# Patient Record
Sex: FEMALE | Race: OTHER | Employment: UNEMPLOYED | URBAN - METROPOLITAN AREA
[De-identification: names, ages, dates, MRNs, and addresses within clinical notes are randomized per-mention and may not be internally consistent; named-entity substitution may affect disease eponyms.]

---

## 2017-01-09 ENCOUNTER — TELEPHONE (OUTPATIENT)
Dept: OBGYN CLINIC | Facility: CLINIC | Age: 32
End: 2017-01-09

## 2017-01-09 NOTE — TELEPHONE ENCOUNTER
Torres Lucas results from [address] [place] [address] dated 1/5/17 placed in [facility] folder for review. Copy to brown folder.

## 2017-01-26 ENCOUNTER — TELEPHONE (OUTPATIENT)
Dept: NEUROLOGY | Facility: CLINIC | Age: 32
End: 2017-01-26

## 2017-01-26 ENCOUNTER — ROUTINE PRENATAL (OUTPATIENT)
Dept: OBGYN CLINIC | Facility: CLINIC | Age: 32
End: 2017-01-26

## 2017-01-26 VITALS
DIASTOLIC BLOOD PRESSURE: 63 MMHG | BODY MASS INDEX: 19 KG/M2 | WEIGHT: 111 LBS | SYSTOLIC BLOOD PRESSURE: 100 MMHG | HEART RATE: 76 BPM

## 2017-01-26 DIAGNOSIS — Z34.92 ENCOUNTER FOR SUPERVISION OF NORMAL PREGNANCY IN SECOND TRIMESTER, UNSPECIFIED GRAVIDITY: Primary | ICD-10-CM

## 2017-01-26 LAB
MULTISTIX LOT#: NORMAL NUMERIC
PH, URINE: 7.5 (ref 4.5–8)
SPECIFIC GRAVITY: 1.01 (ref 1–1.03)
UROBILINOGEN,SEMI-QN: 0.2 MG/DL (ref 0–1.9)

## 2017-01-26 NOTE — PROGRESS NOTES
No issues reported. NT normal.  Patient declined hormones for the FTS. Discussed importance of doing msAFP testing giving anti-seizures meds (tegretol) but patient declines.   Discussed that anti-seizure meds can affect neural tube and cause ONTD, but veronika

## 2017-02-08 ENCOUNTER — TELEPHONE (OUTPATIENT)
Dept: OBGYN CLINIC | Facility: CLINIC | Age: 32
End: 2017-02-08

## 2017-02-08 NOTE — TELEPHONE ENCOUNTER
OB US dated 2/8/17 received from [address] [place] [address] and placed in JLK's folder for review.

## 2017-02-21 ENCOUNTER — TELEPHONE (OUTPATIENT)
Dept: OBGYN CLINIC | Facility: CLINIC | Age: 32
End: 2017-02-21

## 2017-02-22 ENCOUNTER — ROUTINE PRENATAL (OUTPATIENT)
Dept: OBGYN CLINIC | Facility: CLINIC | Age: 32
End: 2017-02-22

## 2017-02-22 VITALS
BODY MASS INDEX: 21 KG/M2 | SYSTOLIC BLOOD PRESSURE: 101 MMHG | DIASTOLIC BLOOD PRESSURE: 60 MMHG | HEART RATE: 91 BPM | WEIGHT: 120 LBS

## 2017-02-22 DIAGNOSIS — Z34.92 ENCOUNTER FOR SUPERVISION OF NORMAL PREGNANCY IN SECOND TRIMESTER, UNSPECIFIED GRAVIDITY: Primary | ICD-10-CM

## 2017-02-22 LAB
MULTISTIX LOT#: NORMAL NUMERIC
PH, URINE: 6.5 (ref 4.5–8)
SPECIFIC GRAVITY: 1.01 (ref 1–1.03)
UROBILINOGEN,SEMI-QN: 0.2 MG/DL (ref 0–1.9)

## 2017-02-22 RX ORDER — FOLIC ACID 1 MG/1
TABLET ORAL
COMMUNITY
End: 2017-03-23

## 2017-02-22 RX ORDER — CARBAMAZEPINE
POWDER (GRAM) MISCELLANEOUS
COMMUNITY
End: 2017-03-23

## 2017-03-10 ENCOUNTER — TELEPHONE (OUTPATIENT)
Dept: OBGYN CLINIC | Facility: CLINIC | Age: 32
End: 2017-03-10

## 2017-03-10 NOTE — TELEPHONE ENCOUNTER
OB U/S dated 3-10-17received from [facility]. Placed on JLK's desk for review and signoff .  Copy also placed in brown folder

## 2017-03-17 PROBLEM — Z36.89 ULTRASOUND SCAN TO CHECK INTERVAL GROWTH OF FETUS: Status: ACTIVE | Noted: 2017-03-17

## 2017-03-23 ENCOUNTER — ROUTINE PRENATAL (OUTPATIENT)
Dept: OBGYN CLINIC | Facility: CLINIC | Age: 32
End: 2017-03-23

## 2017-03-23 VITALS
HEART RATE: 94 BPM | DIASTOLIC BLOOD PRESSURE: 64 MMHG | WEIGHT: 129 LBS | BODY MASS INDEX: 22 KG/M2 | SYSTOLIC BLOOD PRESSURE: 104 MMHG

## 2017-03-23 DIAGNOSIS — Z34.92 ENCOUNTER FOR SUPERVISION OF NORMAL PREGNANCY IN SECOND TRIMESTER, UNSPECIFIED GRAVIDITY: Primary | ICD-10-CM

## 2017-03-23 LAB
GLUCOSE (URINE DIPSTICK): 250 MG/DL
MULTISTIX LOT#: NORMAL NUMERIC
PH, URINE: 6.5 (ref 4.5–8)
SPECIFIC GRAVITY: 1.01 (ref 1–1.03)

## 2017-03-23 RX ORDER — PRENATAL VIT/IRON BISGLY/FOLIC 29 MG-1 MG
TABLET ORAL
Refills: 11 | COMMUNITY
Start: 2017-02-26 | End: 2017-03-23

## 2017-03-23 NOTE — PROGRESS NOTES
Reviewed ketones and glucose in urine- had oatmeal this morning. Recommend labs (CBC and GTT) in next two weeks. Has [facility] US on 4/3/17.    RTC 4 wks

## 2017-03-27 ENCOUNTER — APPOINTMENT (OUTPATIENT)
Dept: LAB | Facility: HOSPITAL | Age: 32
End: 2017-03-27
Attending: OBSTETRICS & GYNECOLOGY
Payer: COMMERCIAL

## 2017-03-27 DIAGNOSIS — Z34.92 ENCOUNTER FOR SUPERVISION OF NORMAL PREGNANCY IN SECOND TRIMESTER, UNSPECIFIED GRAVIDITY: ICD-10-CM

## 2017-03-27 LAB
ERYTHROCYTE [DISTWIDTH] IN BLOOD BY AUTOMATED COUNT: 13.7 % (ref 11–15)
GLUCOSE 1H P 50 G GLC PO SERPL-MCNC: 119 MG/DL
HCT VFR BLD AUTO: 34.3 % (ref 35–48)
HGB BLD-MCNC: 11.7 G/DL (ref 12–16)
MCH RBC QN AUTO: 33.2 PG (ref 27–32)
MCHC RBC AUTO-ENTMCNC: 34.2 G/DL (ref 32–37)
MCV RBC AUTO: 97 FL (ref 80–100)
PLATELET # BLD AUTO: 180 K/UL (ref 140–400)
PMV BLD AUTO: 8.8 FL (ref 7.4–10.3)
RBC # BLD AUTO: 3.53 M/UL (ref 3.7–5.4)
WBC # BLD AUTO: 9.1 K/UL (ref 4–11)

## 2017-03-27 PROCEDURE — 36415 COLL VENOUS BLD VENIPUNCTURE: CPT

## 2017-03-27 PROCEDURE — 82950 GLUCOSE TEST: CPT

## 2017-03-27 PROCEDURE — 85027 COMPLETE CBC AUTOMATED: CPT

## 2017-04-03 ENCOUNTER — TELEPHONE (OUTPATIENT)
Dept: OBGYN CLINIC | Facility: CLINIC | Age: 32
End: 2017-04-03

## 2017-04-03 NOTE — TELEPHONE ENCOUNTER
4-3-17 [address] [place] [address] ULTRASOUND REPORT TO [facility] AND [facility].

## 2017-04-18 ENCOUNTER — TELEPHONE (OUTPATIENT)
Dept: OBGYN CLINIC | Facility: CLINIC | Age: 32
End: 2017-04-18

## 2017-04-18 ENCOUNTER — HOSPITAL ENCOUNTER (INPATIENT)
Facility: HOSPITAL | Age: 32
LOS: 1 days | Discharge: HOME | DRG: 778 | End: 2017-04-19
Attending: OBSTETRICS & GYNECOLOGY | Admitting: OBSTETRICS & GYNECOLOGY
Payer: COMMERCIAL

## 2017-04-18 PROBLEM — Z34.90 PREGNANCY (HCC): Status: ACTIVE | Noted: 2017-04-18

## 2017-04-18 PROBLEM — Z34.90 PREGNANCY: Status: ACTIVE | Noted: 2017-04-18

## 2017-04-18 RX ORDER — CARBAMAZEPINE 200 MG/1
200 TABLET ORAL 3 TIMES DAILY
Status: DISCONTINUED | OUTPATIENT
Start: 2017-04-18 | End: 2017-04-18

## 2017-04-18 RX ORDER — CARBAMAZEPINE 200 MG/1
200 TABLET ORAL EVERY MORNING
Status: DISCONTINUED | OUTPATIENT
Start: 2017-04-19 | End: 2017-04-19

## 2017-04-18 RX ORDER — CARBAMAZEPINE 200 MG/1
400 TABLET ORAL 3 TIMES DAILY
Status: DISCONTINUED | OUTPATIENT
Start: 2017-04-18 | End: 2017-04-18

## 2017-04-18 RX ORDER — DEXTROSE, SODIUM CHLORIDE, SODIUM LACTATE, POTASSIUM CHLORIDE, AND CALCIUM CHLORIDE 5; .6; .31; .03; .02 G/100ML; G/100ML; G/100ML; G/100ML; G/100ML
125 INJECTION, SOLUTION INTRAVENOUS CONTINUOUS
Status: DISCONTINUED | OUTPATIENT
Start: 2017-04-18 | End: 2017-04-19

## 2017-04-18 RX ORDER — CARBAMAZEPINE 200 MG/1
200 TABLET ORAL EVERY MORNING
Status: DISCONTINUED | OUTPATIENT
Start: 2017-04-19 | End: 2017-04-18

## 2017-04-18 RX ORDER — SODIUM CHLORIDE, SODIUM LACTATE, POTASSIUM CHLORIDE, CALCIUM CHLORIDE 600; 310; 30; 20 MG/100ML; MG/100ML; MG/100ML; MG/100ML
INJECTION, SOLUTION INTRAVENOUS
Status: COMPLETED
Start: 2017-04-18 | End: 2017-04-18

## 2017-04-18 RX ORDER — SODIUM CHLORIDE, SODIUM LACTATE, POTASSIUM CHLORIDE, CALCIUM CHLORIDE 600; 310; 30; 20 MG/100ML; MG/100ML; MG/100ML; MG/100ML
INJECTION, SOLUTION INTRAVENOUS CONTINUOUS
Status: DISCONTINUED | OUTPATIENT
Start: 2017-04-18 | End: 2017-04-19

## 2017-04-18 RX ORDER — 0.9 % SODIUM CHLORIDE 0.9 %
VIAL (ML) INJECTION
Status: COMPLETED
Start: 2017-04-18 | End: 2017-04-18

## 2017-04-18 RX ORDER — CARBAMAZEPINE 200 MG/1
400 TABLET ORAL NIGHTLY
Status: DISCONTINUED | OUTPATIENT
Start: 2017-04-18 | End: 2017-04-19

## 2017-04-18 RX ORDER — PRENATAL VIT,CAL 76/IRON/FOLIC 29 MG-1 MG
1 TABLET ORAL DAILY
Status: DISCONTINUED | OUTPATIENT
Start: 2017-04-18 | End: 2017-04-19

## 2017-04-18 RX ORDER — FOLIC ACID 1 MG/1
1 TABLET ORAL DAILY
Status: DISCONTINUED | OUTPATIENT
Start: 2017-04-19 | End: 2017-04-19

## 2017-04-18 RX ORDER — AMMONIA INHALANTS 0.04 G/.3ML
0.3 INHALANT RESPIRATORY (INHALATION) AS NEEDED
Status: DISCONTINUED | OUTPATIENT
Start: 2017-04-18 | End: 2017-04-19

## 2017-04-18 NOTE — TELEPHONE ENCOUNTER
MESSAGE REVIEWED WITH KCB IN THE OFFICE. PT IS TO GO TO [facility] TO BE CHECKED. PT NOTIFIED. PT SAID SHE HAS TO HAVE SOMEONE COME WATCH HER KIDS SO PROBABLY WON'T BE ABLE TO GET THERE BEFORE 1:30PM.   [facility] NOTIFIED.

## 2017-04-18 NOTE — H&P
Kayla Toth 33 Patient Status:  Inpatient    3/1/1985 MRN [id number]   Location [facility] Attending Yoly Owens MD   Hosp Day # 0 PCP Pipe Echeverria MD     Date of Admission: Allergies/Medications: Allergies:   No Known Allergies    Medications:    Prescriptions prior to admission:  folic acid 1 MG Oral Tab Take by mouth daily. Disp:  Rfl:  2017 at 0900   Prenatal Multivit-Min-Fe-FA (PRE- OR) Take by mouth.  Disp antepartum     Recent foreign travel to [facility]     Ultrasound     Pregnancy      Treatment Plan:    FFN obtained prior to exam  Plan for IV hydration  Check urine culture  KB still pending  Contractions not felt -- did not improve after voiding          Ri

## 2017-04-18 NOTE — TELEPHONE ENCOUNTER
C/O REACHING FOR DETERGENT AND IT SLIPPED AND HIT THE TOP OF HER BELLY. THIS HAPPENED EARLIER THIS MORNING BUT IS NOW NOTICING SOME PRESSURE AND SLIGHT PAIN AT THE BOTTOM OF HER BELLY. DENIES ANY SPOTTING.   DOES HAVE DISCHARGE, WET AND CLEAR BUT STATES T

## 2017-04-18 NOTE — TELEPHONE ENCOUNTER
PT STATE THE DETERGENT FALL ON HER BELLY / SHE ALSO STATE SHE'S HAVING LOWER ABD PAIN / PT STATE SHE'S 28 WEEKS / PLS ADV

## 2017-04-19 VITALS
DIASTOLIC BLOOD PRESSURE: 55 MMHG | SYSTOLIC BLOOD PRESSURE: 90 MMHG | HEART RATE: 79 BPM | RESPIRATION RATE: 16 BRPM | TEMPERATURE: 98 F

## 2017-04-19 PROCEDURE — 99221 1ST HOSP IP/OBS SF/LOW 40: CPT | Performed by: OBSTETRICS & GYNECOLOGY

## 2017-04-19 RX ORDER — FOLIC ACID 1 MG/1
1 TABLET ORAL DAILY
Status: DISCONTINUED | OUTPATIENT
Start: 2017-04-18 | End: 2017-04-19

## 2017-04-19 NOTE — PROGRESS NOTES
[facility] HOSP - [facility]    OB/GYNE Antepartum note      Jaye Lerner Patient Status:  Inpatient    3/1/1985 MRN [id number]   Location [facility] Attending Clara Escamilla MD   Hosp Day # 1 PCP Deann Viera MD       Subjective the last 72 hours. Assessment/Plan   IUP at 28w4d admitted for contractions (not felt), hx abd trauma, Hospital Day: 2    Pregnancy  FFN neg, KB neg  Contractions resolved with overnight monitoring. Notified [facility] of care.      Lisa Antis

## 2017-04-21 ENCOUNTER — HOSPITAL ENCOUNTER (OUTPATIENT)
Facility: HOSPITAL | Age: 32
Setting detail: OBSERVATION
Discharge: HOME | DRG: 778 | End: 2017-04-22
Attending: OBSTETRICS & GYNECOLOGY | Admitting: OBSTETRICS & GYNECOLOGY
Payer: COMMERCIAL

## 2017-04-21 PROBLEM — O47.00 PRETERM CONTRACTIONS (HCC): Status: ACTIVE | Noted: 2017-04-21

## 2017-04-21 PROBLEM — O47.00 PRETERM CONTRACTIONS: Status: ACTIVE | Noted: 2017-04-21

## 2017-04-21 RX ORDER — SODIUM CHLORIDE 0.9 % (FLUSH) 0.9 %
10 SYRINGE (ML) INJECTION AS NEEDED
Status: DISCONTINUED | OUTPATIENT
Start: 2017-04-21 | End: 2017-04-22

## 2017-04-21 RX ORDER — ONDANSETRON 2 MG/ML
INJECTION INTRAMUSCULAR; INTRAVENOUS
Status: COMPLETED
Start: 2017-04-21 | End: 2017-04-21

## 2017-04-21 RX ORDER — DEXTROSE, SODIUM CHLORIDE, SODIUM LACTATE, POTASSIUM CHLORIDE, AND CALCIUM CHLORIDE 5; .6; .31; .03; .02 G/100ML; G/100ML; G/100ML; G/100ML; G/100ML
INJECTION, SOLUTION INTRAVENOUS
Status: COMPLETED
Start: 2017-04-21 | End: 2017-04-21

## 2017-04-21 RX ORDER — DEXTROSE, SODIUM CHLORIDE, SODIUM LACTATE, POTASSIUM CHLORIDE, AND CALCIUM CHLORIDE 5; .6; .31; .03; .02 G/100ML; G/100ML; G/100ML; G/100ML; G/100ML
INJECTION, SOLUTION INTRAVENOUS CONTINUOUS
Status: ACTIVE | OUTPATIENT
Start: 2017-04-21 | End: 2017-04-21

## 2017-04-21 RX ORDER — SODIUM CHLORIDE, SODIUM LACTATE, POTASSIUM CHLORIDE, CALCIUM CHLORIDE 600; 310; 30; 20 MG/100ML; MG/100ML; MG/100ML; MG/100ML
INJECTION, SOLUTION INTRAVENOUS CONTINUOUS
Status: DISCONTINUED | OUTPATIENT
Start: 2017-04-21 | End: 2017-04-22

## 2017-04-21 RX ORDER — DEXTROSE, SODIUM CHLORIDE, SODIUM LACTATE, POTASSIUM CHLORIDE, AND CALCIUM CHLORIDE 5; .6; .31; .03; .02 G/100ML; G/100ML; G/100ML; G/100ML; G/100ML
INJECTION, SOLUTION INTRAVENOUS CONTINUOUS
Status: DISCONTINUED | OUTPATIENT
Start: 2017-04-21 | End: 2017-04-22

## 2017-04-21 RX ORDER — SODIUM CHLORIDE, SODIUM LACTATE, POTASSIUM CHLORIDE, CALCIUM CHLORIDE 600; 310; 30; 20 MG/100ML; MG/100ML; MG/100ML; MG/100ML
INJECTION, SOLUTION INTRAVENOUS
Status: COMPLETED
Start: 2017-04-21 | End: 2017-04-21

## 2017-04-21 RX ORDER — ONDANSETRON 2 MG/ML
4 INJECTION INTRAMUSCULAR; INTRAVENOUS ONCE
Status: COMPLETED | OUTPATIENT
Start: 2017-04-21 | End: 2017-04-21

## 2017-04-22 ENCOUNTER — TELEPHONE (OUTPATIENT)
Dept: OBGYN CLINIC | Facility: CLINIC | Age: 32
End: 2017-04-22

## 2017-04-22 VITALS
HEART RATE: 94 BPM | RESPIRATION RATE: 17 BRPM | DIASTOLIC BLOOD PRESSURE: 46 MMHG | SYSTOLIC BLOOD PRESSURE: 87 MMHG | TEMPERATURE: 99 F

## 2017-04-22 PROBLEM — O21.9: Status: ACTIVE | Noted: 2017-04-22

## 2017-04-22 PROBLEM — R11.10 VOMITING: Status: ACTIVE | Noted: 2017-04-22

## 2017-04-22 PROBLEM — O21.9 VOMITING PREGNANCY: Status: ACTIVE | Noted: 2017-04-22

## 2017-04-22 PROCEDURE — 99221 1ST HOSP IP/OBS SF/LOW 40: CPT | Performed by: OBSTETRICS & GYNECOLOGY

## 2017-04-22 RX ORDER — PROCHLORPERAZINE 25 MG
25 SUPPOSITORY, RECTAL RECTAL EVERY 12 HOURS PRN
Status: DISCONTINUED | OUTPATIENT
Start: 2017-04-22 | End: 2017-04-22

## 2017-04-22 RX ORDER — METOCLOPRAMIDE HYDROCHLORIDE 5 MG/ML
10 INJECTION INTRAMUSCULAR; INTRAVENOUS EVERY 6 HOURS PRN
Status: DISCONTINUED | OUTPATIENT
Start: 2017-04-22 | End: 2017-04-22

## 2017-04-22 RX ORDER — ONDANSETRON 4 MG/1
4 TABLET, FILM COATED ORAL EVERY 8 HOURS PRN
Qty: 12 TABLET | Refills: 0 | Status: SHIPPED | OUTPATIENT
Start: 2017-04-22 | End: 2017-05-26

## 2017-04-22 RX ORDER — ONDANSETRON 4 MG/1
4 TABLET, ORALLY DISINTEGRATING ORAL ONCE
Status: DISCONTINUED | OUTPATIENT
Start: 2017-04-22 | End: 2017-04-22

## 2017-04-22 RX ORDER — ONDANSETRON 2 MG/ML
8 INJECTION INTRAMUSCULAR; INTRAVENOUS EVERY 6 HOURS PRN
Status: DISCONTINUED | OUTPATIENT
Start: 2017-04-22 | End: 2017-04-22

## 2017-04-22 NOTE — PROGRESS NOTES
Spoke with Dr Mary Jane Bernardo informed that pt vomited and informed of POC urine results and orders recd for zofran 4 mg IVp and after 1 liter bolus of LR follow with !  Liter bolus of D5LR

## 2017-04-22 NOTE — H&P
Kayla Toth 33 Patient Status:  Inpatient    3/1/1985 MRN [id number]   Location [address] C-[address] Attending Steffanie Key MD   Hosp Day # 1 PCP Phi Olivas MD     Date of Admission oz (3.714 kg) F NORMAL SPONT None N Y   1 Term 06/09/09 40w3d  8 lb 8 oz (3.856 kg) F NORMAL SPONT None N Y        Past Medical History:   Past Medical History   Diagnosis Date   • Seizure disorder ([facility] [address] 2003     last seizure 4/2015   • Environmental aller 32.0-37.0 g/dl   RDW 13.1 11.0-15.0 %    140-400 K/UL   MPV 9.1 7.4-10.3 fL   -FETAL FIBRONECTIN   Result Value Ref Range   Fetal Fibrinectin Negative Negative   -KLEIHAUER BETKE   Result Value Ref Range   Kleihauer Betke Result <0.1 %   Gelene Nailer

## 2017-04-22 NOTE — PROGRESS NOTES
Home in [address] [place] [address]. Per  [facility]. Pt c/o N/v on admission resolved at this time . nahomy  Juice and crackers. Pt agrees w/poc. rx sent to pharmacy by md. F/u in ob office 1 wks. Advance diet as nahomy.   Fluids and diet teaching done w/pt

## 2017-04-24 ENCOUNTER — ROUTINE PRENATAL (OUTPATIENT)
Dept: OBGYN CLINIC | Facility: CLINIC | Age: 32
End: 2017-04-24

## 2017-04-24 VITALS
BODY MASS INDEX: 23 KG/M2 | DIASTOLIC BLOOD PRESSURE: 61 MMHG | WEIGHT: 132 LBS | HEART RATE: 91 BPM | SYSTOLIC BLOOD PRESSURE: 101 MMHG

## 2017-04-24 DIAGNOSIS — Z34.83 ENCOUNTER FOR SUPERVISION OF OTHER NORMAL PREGNANCY IN THIRD TRIMESTER: Primary | ICD-10-CM

## 2017-04-24 NOTE — PROGRESS NOTES
Was in [facility] last week after heavy object fell on her abdomen- observed overnight due to UC's, pt denies current UC's

## 2017-04-24 NOTE — PAYOR COMM NOTE
Attending Physician: No att. providers found    Review Type: ADMISSION   Reviewer:  Eligio Thrasher       Date: April 24, 2017 - 2:17 PM  Payor: [employer]  Authorization Number: [id number]  Admit date: 4/18/2017  1:12 PM   Admitted from [facility] 40w3d  8 lb 8 oz (3.856 kg) F NORMAL SPONT None N Y          Gyne History: Cervical Papanicolaou to be done in MD's office  , monthly periods, hx of STD: none    Past Medical History:   Past Medical History   Diagnosis Date   • Seizure disorder ([facility] [address] 2003 Lab Results  Component Value Date   COLORUR Yellow 10/30/2014   CLARITY Hazy* 10/30/2014   SPECGRAVITY 1.010 03/23/2017   PROUR Negative 10/30/2014   GLUUR 250 03/23/2017   KETUR Negative 10/30/2014   BILUR Negative 10/30/2014   BLOODURINE Negative

## 2017-05-01 ENCOUNTER — TELEPHONE (OUTPATIENT)
Dept: OBGYN CLINIC | Facility: CLINIC | Age: 32
End: 2017-05-01

## 2017-05-01 NOTE — TELEPHONE ENCOUNTER
[address] report dated 5/1/17 placed in [facility] folder for review. Copy to brown folder.

## 2017-05-08 NOTE — PAYOR COMM NOTE
Attending Physician: No att. providers found    Review Type: ADMISSION   Reviewer:  Jessica Parisi       Date: May 8, 2017 - 11:39 AM  [facility]  Authorization Number: [id number]  Admit date: 4/21/2017  7:59 PM   Admitted from [facility]

## 2017-05-12 ENCOUNTER — ROUTINE PRENATAL (OUTPATIENT)
Dept: OBGYN CLINIC | Facility: CLINIC | Age: 32
End: 2017-05-12

## 2017-05-12 VITALS
HEART RATE: 73 BPM | BODY MASS INDEX: 23 KG/M2 | WEIGHT: 136 LBS | DIASTOLIC BLOOD PRESSURE: 60 MMHG | SYSTOLIC BLOOD PRESSURE: 93 MMHG

## 2017-05-12 DIAGNOSIS — Z34.93 ENCOUNTER FOR SUPERVISION OF NORMAL PREGNANCY IN THIRD TRIMESTER, UNSPECIFIED GRAVIDITY: Primary | ICD-10-CM

## 2017-05-19 NOTE — PAYOR COMM NOTE
Ayleen Broussard #[id number]  (30 year old F)       [facility] [id number]         Yenifer Enriquez MD Physician Addendum  H&P 4/22/2017  5:10 AM      Expand All Collapse All    [address]   Para  Term    AB  SAB  TAB  Ectopic  Multiple  Living    4  3  3              3        #  Outcome  Date  GA  Lbr Gilberto/2nd  Weight  Sex  Delivery  Anes  PTL  Lv    4  Current                      3  Term  03/15/15  40w4d    9 lb 14 oz (4.479  130-140 BPM, Fetal heart variability: moderate and reactive for gestational age    Decelerations: No  Contractions: Irritability on admission which has now resolved.      Lab Review:    Results for orders placed or performed during the hospital encounter 2. Resolved now that patient well hydrated    3. Plan to PO challenge patient today. If able to tolerate PO will discharge home with Zofran and follow up in office this week. If unable to tolerate PO patient will not be able to be discharged.  Zofran sent t

## 2017-05-26 ENCOUNTER — ROUTINE PRENATAL (OUTPATIENT)
Dept: OBGYN CLINIC | Facility: CLINIC | Age: 32
End: 2017-05-26

## 2017-05-26 VITALS
SYSTOLIC BLOOD PRESSURE: 98 MMHG | DIASTOLIC BLOOD PRESSURE: 66 MMHG | HEART RATE: 71 BPM | BODY MASS INDEX: 24 KG/M2 | WEIGHT: 138.19 LBS

## 2017-05-26 DIAGNOSIS — Z34.83 ENCOUNTER FOR SUPERVISION OF OTHER NORMAL PREGNANCY IN THIRD TRIMESTER: Primary | ICD-10-CM

## 2017-05-30 ENCOUNTER — TELEPHONE (OUTPATIENT)
Dept: OBGYN CLINIC | Facility: CLINIC | Age: 32
End: 2017-05-30

## 2017-06-09 ENCOUNTER — ROUTINE PRENATAL (OUTPATIENT)
Dept: OBGYN CLINIC | Facility: CLINIC | Age: 32
End: 2017-06-09

## 2017-06-09 ENCOUNTER — APPOINTMENT (OUTPATIENT)
Dept: LAB | Facility: HOSPITAL | Age: 32
End: 2017-06-09
Attending: OBSTETRICS & GYNECOLOGY
Payer: COMMERCIAL

## 2017-06-09 VITALS
SYSTOLIC BLOOD PRESSURE: 105 MMHG | BODY MASS INDEX: 24 KG/M2 | WEIGHT: 141 LBS | DIASTOLIC BLOOD PRESSURE: 67 MMHG | HEART RATE: 91 BPM

## 2017-06-09 DIAGNOSIS — Z34.93 ENCOUNTER FOR SUPERVISION OF NORMAL PREGNANCY IN THIRD TRIMESTER, UNSPECIFIED GRAVIDITY: Primary | ICD-10-CM

## 2017-06-09 DIAGNOSIS — Z34.93 ENCOUNTER FOR SUPERVISION OF NORMAL PREGNANCY IN THIRD TRIMESTER, UNSPECIFIED GRAVIDITY: ICD-10-CM

## 2017-06-09 PROCEDURE — 85027 COMPLETE CBC AUTOMATED: CPT

## 2017-06-09 PROCEDURE — 86780 TREPONEMA PALLIDUM: CPT

## 2017-06-09 PROCEDURE — 36415 COLL VENOUS BLD VENIPUNCTURE: CPT

## 2017-06-23 ENCOUNTER — ROUTINE PRENATAL (OUTPATIENT)
Dept: OBGYN CLINIC | Facility: CLINIC | Age: 32
End: 2017-06-23

## 2017-06-23 VITALS
WEIGHT: 148 LBS | DIASTOLIC BLOOD PRESSURE: 62 MMHG | BODY MASS INDEX: 25 KG/M2 | HEART RATE: 80 BPM | SYSTOLIC BLOOD PRESSURE: 101 MMHG

## 2017-06-23 DIAGNOSIS — Z34.93 ENCOUNTER FOR SUPERVISION OF NORMAL PREGNANCY IN THIRD TRIMESTER, UNSPECIFIED GRAVIDITY: Primary | ICD-10-CM

## 2017-06-23 RX ORDER — PRENATAL VIT/IRON BISGLY/FOLIC 29 MG-1 MG
TABLET ORAL
Refills: 11 | Status: ON HOLD | COMMUNITY
Start: 2017-03-28 | End: 2017-07-19

## 2017-06-30 ENCOUNTER — ROUTINE PRENATAL (OUTPATIENT)
Dept: OBGYN CLINIC | Facility: CLINIC | Age: 32
End: 2017-06-30

## 2017-06-30 VITALS
WEIGHT: 147 LBS | BODY MASS INDEX: 25 KG/M2 | HEART RATE: 82 BPM | DIASTOLIC BLOOD PRESSURE: 69 MMHG | SYSTOLIC BLOOD PRESSURE: 109 MMHG

## 2017-06-30 DIAGNOSIS — Z34.83 OTHER NORMAL PREGNANCY, NOT FIRST, THIRD TRIMESTER: Primary | ICD-10-CM

## 2017-06-30 LAB
MULTISTIX LOT#: NORMAL NUMERIC
PH, URINE: 7.5 (ref 4.5–8)
SPECIFIC GRAVITY: 1.01 (ref 1–1.03)
URINE-COLOR: YELLOW
UROBILINOGEN,SEMI-QN: 0 MG/DL (ref 0–1.9)

## 2017-06-30 PROCEDURE — 59426 ANTEPARTUM CARE ONLY: CPT | Performed by: OBSTETRICS & GYNECOLOGY

## 2017-07-06 ENCOUNTER — ROUTINE PRENATAL (OUTPATIENT)
Dept: OBGYN CLINIC | Facility: CLINIC | Age: 32
End: 2017-07-06

## 2017-07-06 VITALS
WEIGHT: 148 LBS | HEART RATE: 71 BPM | BODY MASS INDEX: 25 KG/M2 | SYSTOLIC BLOOD PRESSURE: 97 MMHG | DIASTOLIC BLOOD PRESSURE: 60 MMHG

## 2017-07-06 DIAGNOSIS — Z34.83 ENCOUNTER FOR SUPERVISION OF OTHER NORMAL PREGNANCY IN THIRD TRIMESTER: Primary | ICD-10-CM

## 2017-07-06 LAB
APPEARANCE: CLEAR
MULTISTIX LOT#: NORMAL NUMERIC
URINE-COLOR: YELLOW

## 2017-07-13 ENCOUNTER — ROUTINE PRENATAL (OUTPATIENT)
Dept: OBGYN CLINIC | Facility: CLINIC | Age: 32
End: 2017-07-13

## 2017-07-13 VITALS
DIASTOLIC BLOOD PRESSURE: 68 MMHG | SYSTOLIC BLOOD PRESSURE: 103 MMHG | WEIGHT: 149.81 LBS | BODY MASS INDEX: 26 KG/M2 | HEART RATE: 83 BPM

## 2017-07-13 DIAGNOSIS — Z34.93 ENCOUNTER FOR SUPERVISION OF NORMAL PREGNANCY IN THIRD TRIMESTER, UNSPECIFIED GRAVIDITY: Primary | ICD-10-CM

## 2017-07-13 LAB
MULTISTIX LOT#: NORMAL NUMERIC
PH, URINE: 7 (ref 4.5–8)
SPECIFIC GRAVITY: 1.01 (ref 1–1.03)
UROBILINOGEN,SEMI-QN: 0 MG/DL (ref 0–1.9)

## 2017-07-13 PROCEDURE — 76815 OB US LIMITED FETUS(S): CPT | Performed by: OBSTETRICS & GYNECOLOGY

## 2017-07-13 NOTE — PROGRESS NOTES
Ronald at visit. Began irregular and mild UC's at 1100 today. No ROM/ blood. KIRSTY today:  2.7, 3.1, 2.9, 1.1 = 9.8 cm. NST scheduled for 11AM tomorrow and she'll need Cervidil scheduled on Monday 07-17 evening if undelivered.

## 2017-07-14 ENCOUNTER — HOSPITAL ENCOUNTER (OUTPATIENT)
Dept: OBGYN CLINIC | Facility: HOSPITAL | Age: 32
Discharge: HOME | End: 2017-07-14
Payer: COMMERCIAL

## 2017-07-14 ENCOUNTER — TELEPHONE (OUTPATIENT)
Dept: OBGYN CLINIC | Facility: CLINIC | Age: 32
End: 2017-07-14

## 2017-07-14 ENCOUNTER — HOSPITAL ENCOUNTER (OUTPATIENT)
Facility: HOSPITAL | Age: 32
Discharge: HOME | End: 2017-07-14
Attending: OBSTETRICS & GYNECOLOGY | Admitting: OBSTETRICS & GYNECOLOGY
Payer: COMMERCIAL

## 2017-07-14 VITALS — SYSTOLIC BLOOD PRESSURE: 114 MMHG | DIASTOLIC BLOOD PRESSURE: 65 MMHG | HEART RATE: 84 BPM

## 2017-07-14 PROCEDURE — 59025 FETAL NON-STRESS TEST: CPT | Performed by: OBSTETRICS & GYNECOLOGY

## 2017-07-14 NOTE — TELEPHONE ENCOUNTER
Pt at ; just returned from [facility]; wants to speak with him re induction on Monday; pt seen by dr DEWEY yest

## 2017-07-14 NOTE — TELEPHONE ENCOUNTER
JONATHAN called and stated that pt is coming to see ZULEIMA after having her NST done at [facility] today. The nurses after the NST were trying to schedule her for Cervidil for Saturday, 7/15. Pt wanting her Cervidil for Monday, 7/17/17 and not available.  Pt came to the

## 2017-07-14 NOTE — TELEPHONE ENCOUNTER
I spoke with [facility] and then patient. NST scheduled 1130 on Monday 07-17 followed by Cervidil scheduled 1800 on Wed 07-19-17. I am on call and will do orders. I'm in hospital then.

## 2017-07-14 NOTE — NST
Nonstress Test   Patient: Fe Fleet    Gestation: 40w6d    NST:       Variability: Moderate           Accelerations: Yes           Decelerations: None            Baseline: 135 BPM           Uterine Irritability: No

## 2017-07-17 ENCOUNTER — HOSPITAL ENCOUNTER (OUTPATIENT)
Facility: HOSPITAL | Age: 32
Discharge: HOME | End: 2017-07-17
Attending: OBSTETRICS & GYNECOLOGY | Admitting: OBSTETRICS & GYNECOLOGY
Payer: MEDICAID

## 2017-07-17 ENCOUNTER — TELEPHONE (OUTPATIENT)
Dept: OBGYN CLINIC | Facility: CLINIC | Age: 32
End: 2017-07-17

## 2017-07-17 ENCOUNTER — HOSPITAL ENCOUNTER (INPATIENT)
Facility: HOSPITAL | Age: 32
LOS: 2 days | Discharge: HOME | End: 2017-07-19
Attending: OBSTETRICS & GYNECOLOGY | Admitting: OBSTETRICS & GYNECOLOGY
Payer: MEDICAID

## 2017-07-17 ENCOUNTER — HOSPITAL ENCOUNTER (OUTPATIENT)
Dept: OBGYN CLINIC | Facility: HOSPITAL | Age: 32
Discharge: HOME | End: 2017-07-17
Payer: MEDICAID

## 2017-07-17 VITALS — HEART RATE: 81 BPM | SYSTOLIC BLOOD PRESSURE: 114 MMHG | DIASTOLIC BLOOD PRESSURE: 65 MMHG

## 2017-07-17 LAB
ANTIBODY SCREEN: NEGATIVE
ERYTHROCYTE [DISTWIDTH] IN BLOOD BY AUTOMATED COUNT: 13.8 % (ref 11–15)
HCT VFR BLD AUTO: 38.7 % (ref 35–48)
HGB BLD-MCNC: 13.3 G/DL (ref 12–16)
MCH RBC QN AUTO: 33 PG (ref 27–32)
MCHC RBC AUTO-ENTMCNC: 34.4 G/DL (ref 32–37)
MCV RBC AUTO: 96.1 FL (ref 80–100)
PLATELET # BLD AUTO: 168 K/UL (ref 140–400)
PMV BLD AUTO: 9.5 FL (ref 7.4–10.3)
RBC # BLD AUTO: 4.03 M/UL (ref 3.7–5.4)
RH BLOOD TYPE: POSITIVE
WBC # BLD AUTO: 9.7 K/UL (ref 4–11)

## 2017-07-17 PROCEDURE — 59025 FETAL NON-STRESS TEST: CPT | Performed by: OBSTETRICS & GYNECOLOGY

## 2017-07-17 PROCEDURE — 59409 OBSTETRICAL CARE: CPT | Performed by: OBSTETRICS & GYNECOLOGY

## 2017-07-17 RX ORDER — LIDOCAINE HYDROCHLORIDE 10 MG/ML
30 INJECTION, SOLUTION EPIDURAL; INFILTRATION; INTRACAUDAL; PERINEURAL ONCE
Status: DISCONTINUED | OUTPATIENT
Start: 2017-07-17 | End: 2017-07-18 | Stop reason: HOSPADM

## 2017-07-17 RX ORDER — TERBUTALINE SULFATE 1 MG/ML
0.25 INJECTION, SOLUTION SUBCUTANEOUS AS NEEDED
Status: DISCONTINUED | OUTPATIENT
Start: 2017-07-17 | End: 2017-07-18 | Stop reason: HOSPADM

## 2017-07-17 RX ORDER — DEXTROSE, SODIUM CHLORIDE, SODIUM LACTATE, POTASSIUM CHLORIDE, AND CALCIUM CHLORIDE 5; .6; .31; .03; .02 G/100ML; G/100ML; G/100ML; G/100ML; G/100ML
125 INJECTION, SOLUTION INTRAVENOUS CONTINUOUS
Status: DISCONTINUED | OUTPATIENT
Start: 2017-07-17 | End: 2017-07-18 | Stop reason: HOSPADM

## 2017-07-17 RX ORDER — ONDANSETRON 2 MG/ML
4 INJECTION INTRAMUSCULAR; INTRAVENOUS EVERY 6 HOURS PRN
Status: DISCONTINUED | OUTPATIENT
Start: 2017-07-17 | End: 2017-07-18

## 2017-07-17 RX ORDER — IBUPROFEN 600 MG/1
600 TABLET ORAL ONCE AS NEEDED
Status: DISCONTINUED | OUTPATIENT
Start: 2017-07-17 | End: 2017-07-18 | Stop reason: HOSPADM

## 2017-07-17 RX ORDER — DEXTROSE, SODIUM CHLORIDE, SODIUM LACTATE, POTASSIUM CHLORIDE, AND CALCIUM CHLORIDE 5; .6; .31; .03; .02 G/100ML; G/100ML; G/100ML; G/100ML; G/100ML
INJECTION, SOLUTION INTRAVENOUS
Status: DISPENSED
Start: 2017-07-17 | End: 2017-07-18

## 2017-07-17 RX ORDER — SODIUM CHLORIDE 0.9 % (FLUSH) 0.9 %
10 SYRINGE (ML) INJECTION AS NEEDED
Status: DISCONTINUED | OUTPATIENT
Start: 2017-07-17 | End: 2017-07-18 | Stop reason: HOSPADM

## 2017-07-17 RX ORDER — AMMONIA INHALANTS 0.04 G/.3ML
0.3 INHALANT RESPIRATORY (INHALATION) AS NEEDED
Status: DISCONTINUED | OUTPATIENT
Start: 2017-07-17 | End: 2017-07-18 | Stop reason: HOSPADM

## 2017-07-17 RX ORDER — SODIUM CHLORIDE, SODIUM LACTATE, POTASSIUM CHLORIDE, CALCIUM CHLORIDE 600; 310; 30; 20 MG/100ML; MG/100ML; MG/100ML; MG/100ML
INJECTION, SOLUTION INTRAVENOUS CONTINUOUS
Status: DISCONTINUED | OUTPATIENT
Start: 2017-07-17 | End: 2017-07-19

## 2017-07-17 NOTE — NST
Nonstress Test   Patient: Travis Luna    Gestation: 41w2d    NST: postdates       Variability: Moderate           Accelerations: Yes (x1)           Decelerations: None            Baseline: 135 BPM           Uterine Irritability: No           Con

## 2017-07-17 NOTE — TELEPHONE ENCOUNTER
C/O IRREGULAR UC'S SINCE 2AM.  SOMETIMES SHE WILL GO AN HOUR WITHOUT A CONTRACTION AND THEN HAVE SEVERAL IN AN HOUR. STATES SHE LOST MUCUS PLUG EARLIER. REASSURED HER SHE CAN SHOWER EVEN THOUGH SHE LOST MUCUS PLUG.   DENIES ANY BLEEDING OR LEAKING AND BAB

## 2017-07-17 NOTE — TELEPHONE ENCOUNTER
PT STATE SHE'S 41 WEEKS / PT STATE SHE'S SCHEDULE FOR A NON STRESS TEST TODAY AT 11:30 / PT STATE SHE'S HAVING CONTRACTIONS / PT WANT TO KNOW IF SHE COULD JUST STAY HOME TO WALK AROUND / INSTEAD OF GOING TO  HER APPT / PLS  ADV

## 2017-07-18 LAB
BASOPHILS # BLD: 0 K/UL (ref 0–0.2)
BASOPHILS NFR BLD: 0 %
EOSINOPHIL # BLD: 0 K/UL (ref 0–0.7)
EOSINOPHIL NFR BLD: 0 %
ERYTHROCYTE [DISTWIDTH] IN BLOOD BY AUTOMATED COUNT: 13.7 % (ref 11–15)
GLUCOSE BLDC GLUCOMTR-MCNC: 85 MG/DL (ref 70–99)
HCT VFR BLD AUTO: 35.7 % (ref 35–48)
HGB BLD-MCNC: 12 G/DL (ref 12–16)
LYMPHOCYTES # BLD: 1.7 K/UL (ref 1–4)
LYMPHOCYTES NFR BLD: 12 %
MCH RBC QN AUTO: 32.4 PG (ref 27–32)
MCHC RBC AUTO-ENTMCNC: 33.5 G/DL (ref 32–37)
MCV RBC AUTO: 96.8 FL (ref 80–100)
MONOCYTES # BLD: 0.8 K/UL (ref 0–1)
MONOCYTES NFR BLD: 6 %
NEUTROPHILS # BLD AUTO: 11.9 K/UL (ref 1.8–7.7)
NEUTROPHILS NFR BLD: 82 %
PLATELET # BLD AUTO: 151 K/UL (ref 140–400)
PMV BLD AUTO: 9.9 FL (ref 7.4–10.3)
RBC # BLD AUTO: 3.69 M/UL (ref 3.7–5.4)
WBC # BLD AUTO: 14.5 K/UL (ref 4–11)

## 2017-07-18 PROCEDURE — 0HQ9XZZ REPAIR PERINEUM SKIN, EXTERNAL APPROACH: ICD-10-PCS | Performed by: OBSTETRICS & GYNECOLOGY

## 2017-07-18 RX ORDER — DOCUSATE SODIUM 100 MG/1
100 CAPSULE, LIQUID FILLED ORAL 2 TIMES DAILY
Status: DISCONTINUED | OUTPATIENT
Start: 2017-07-18 | End: 2017-07-19

## 2017-07-18 RX ORDER — ONDANSETRON 2 MG/ML
4 INJECTION INTRAMUSCULAR; INTRAVENOUS EVERY 6 HOURS PRN
Status: DISCONTINUED | OUTPATIENT
Start: 2017-07-18 | End: 2017-07-19

## 2017-07-18 RX ORDER — AMMONIA INHALANTS 0.04 G/.3ML
0.3 INHALANT RESPIRATORY (INHALATION) AS NEEDED
Status: DISCONTINUED | OUTPATIENT
Start: 2017-07-18 | End: 2017-07-19

## 2017-07-18 RX ORDER — DIAPER,BRIEF,INFANT-TODD,DISP
1 EACH MISCELLANEOUS EVERY 6 HOURS PRN
Status: DISCONTINUED | OUTPATIENT
Start: 2017-07-18 | End: 2017-07-19

## 2017-07-18 RX ORDER — SIMETHICONE 80 MG
80 TABLET,CHEWABLE ORAL 3 TIMES DAILY PRN
Status: DISCONTINUED | OUTPATIENT
Start: 2017-07-18 | End: 2017-07-19

## 2017-07-18 RX ORDER — BISACODYL 10 MG
10 SUPPOSITORY, RECTAL RECTAL ONCE AS NEEDED
Status: DISCONTINUED | OUTPATIENT
Start: 2017-07-18 | End: 2017-07-19

## 2017-07-18 RX ORDER — IBUPROFEN 400 MG/1
400 TABLET ORAL EVERY 4 HOURS PRN
Status: DISCONTINUED | OUTPATIENT
Start: 2017-07-18 | End: 2017-07-19

## 2017-07-18 RX ORDER — SODIUM CHLORIDE 0.9 % (FLUSH) 0.9 %
10 SYRINGE (ML) INJECTION AS NEEDED
Status: DISCONTINUED | OUTPATIENT
Start: 2017-07-18 | End: 2017-07-19

## 2017-07-18 RX ORDER — PRENATAL VIT,CAL 76/IRON/FOLIC 29 MG-1 MG
1 TABLET ORAL DAILY
Status: DISCONTINUED | OUTPATIENT
Start: 2017-07-18 | End: 2017-07-19

## 2017-07-18 RX ORDER — IBUPROFEN 600 MG/1
600 TABLET ORAL EVERY 4 HOURS PRN
Status: DISCONTINUED | OUTPATIENT
Start: 2017-07-18 | End: 2017-07-19

## 2017-07-18 RX ORDER — IBUPROFEN 400 MG/1
200 TABLET ORAL EVERY 4 HOURS PRN
Status: DISCONTINUED | OUTPATIENT
Start: 2017-07-18 | End: 2017-07-19

## 2017-07-18 NOTE — PROGRESS NOTES
[place] [facility] - [facility]    OB/Gyne Post  Progress Note      Carlos Arthur Patient Status:  Inpatient    3/1/1985 MRN [id number]   Location [facility] 3SE Attending Stacey Lara MD   Hosp Day # 1 PCP MD Esther Santos 3.70 - 5.40 M/UL   HGB 12.0 12.0 - 16.0 g/dL   HCT 35.7 35.0 - 48.0 %   MCV 96.8 80.0 - 100.0 fL   MCH 32.4 (H) 27.0 - 32.0 pg   MCHC 33.5 32.0 - 37.0 g/dl   RDW 13.7 11.0 - 15.0 %    140 - 400 K/UL   MPV 9.9 7.4 - 10.3 fL   Neutrophil % 82 %   Lymp

## 2017-07-18 NOTE — DISCHARGE SUMMARY
[place] [facility] - [facility]    Discharge Summary    Shawnee Barriga Patient Status:  Inpatient    3/1/1985 MRN [id number]   Location [facility] Attending Cody Rowley MD   [facility] Day # 2       Admission date:

## 2017-07-18 NOTE — PROGRESS NOTES
Patient received into room [360] via wheelchair .    Bedside report received from [JEFF],RN.  Bed in locked and low position.  Side rails up x2.  Vitals signs within normal limits, fundus firm at U/U, lochia small, no clots noted.  IV site unremarkable.  P

## 2017-07-18 NOTE — L&D DELIVERY NOTE
[facility]    Vaginal Delivery Note    Elizabeth Lisbon Patient Status:  Inpatient    3/1/1985 MRN [id number]   Location [unfilled] Attending Jg Tena MD   Hosp Day # 1 PCP Samantha Kunz MD     Delivery     Infant Info:  Leodan spontaneous  Placenta to Pathology: yes    Cord info:  Cord Gases Submitted: yes  Cord Blood Collection: no  Cord Tissue Collection: no  Cord Complications: none    Sponge and Needle Counts:  Verified    EBL:  300ml    Toyin Nguyễn MD   7/18/2017  12:0

## 2017-07-18 NOTE — H&P
Kayla Toth 33 Patient Status:  Inpatient    3/1/1985 MRN [id number]   Location [facility] Attending Maria Ines Baker MD   Hosp Day # 0 PCP Antonia Johnson MD     Date Oral Tab Take 200 mg by mouth 3 (three) times daily.  Disp:  Rfl:  7/17/2017 at Unknown time   Prenatal Vit w/ Fe Bisg-FA (VINATE II) 29-1 MG Oral Tab  Disp:  Rfl: 11 Unknown at Unknown time       Review of Systems:   As documented in HPI      Physical Exam

## 2017-07-19 VITALS
DIASTOLIC BLOOD PRESSURE: 51 MMHG | BODY MASS INDEX: 26 KG/M2 | TEMPERATURE: 98 F | SYSTOLIC BLOOD PRESSURE: 93 MMHG | WEIGHT: 150 LBS | RESPIRATION RATE: 16 BRPM | HEART RATE: 62 BPM

## 2017-07-19 PROCEDURE — 3E023GC INTRODUCTION OF OTHER THERAPEUTIC SUBSTANCE INTO MUSCLE, PERCUTANEOUS APPROACH: ICD-10-PCS | Performed by: OBSTETRICS & GYNECOLOGY

## 2017-07-19 PROCEDURE — 99239 HOSP IP/OBS DSCHRG MGMT >30: CPT | Performed by: OBSTETRICS & GYNECOLOGY

## 2017-07-19 RX ORDER — PSEUDOEPHEDRINE HCL 30 MG
100 TABLET ORAL 2 TIMES DAILY PRN
Qty: 60 CAPSULE | Refills: 0 | Status: SHIPPED | OUTPATIENT
Start: 2017-07-19 | End: 2017-08-28

## 2017-07-19 RX ORDER — IBUPROFEN 600 MG/1
600 TABLET ORAL EVERY 6 HOURS PRN
Qty: 30 TABLET | Refills: 0 | Status: SHIPPED | OUTPATIENT
Start: 2017-07-19 | End: 2017-08-28

## 2017-07-19 NOTE — LACTATION NOTE
LACTATION NOTE - MOTHER           Problems identified  Problems identified: Milk supply WNL    Maternal history  Other/comment: seizure disorder    Breastfeeding goal  Breastfeeding goal: To maintain breast milk feeding per patient goal    Maternal Assessm

## 2017-07-19 NOTE — LACTATION NOTE
LACTATION NOTE - MOTHER           Problems identified  Problems identified: Knowledge deficit;Milk supply WNL    Maternal history  Other/comment: seizure disorder    Breastfeeding goal  Breastfeeding goal: To maintain breast milk feeding per patient goal

## 2017-07-24 ENCOUNTER — TELEPHONE (OUTPATIENT)
Dept: OBGYN CLINIC | Facility: CLINIC | Age: 32
End: 2017-07-24

## 2017-07-24 NOTE — TELEPHONE ENCOUNTER
Pt had  on  with Dr. Enrike Harris. Pt states she \"sees something white in the area near her stiches and sees a bump that is painful (5/10)\". Pt thinks there may be a foul odor coming from the stiches. Pt is not taking any pain meds.  Pt denies VB, states

## 2017-07-24 NOTE — TELEPHONE ENCOUNTER
Per the pt she got stitches after she gave birth and they are giving off a strange odor. The pt states that she also feels a bump there. Please advise.

## 2017-07-25 ENCOUNTER — OFFICE VISIT (OUTPATIENT)
Dept: OBGYN CLINIC | Facility: CLINIC | Age: 32
End: 2017-07-25

## 2017-07-25 VITALS
SYSTOLIC BLOOD PRESSURE: 109 MMHG | WEIGHT: 132 LBS | HEART RATE: 69 BPM | DIASTOLIC BLOOD PRESSURE: 72 MMHG | BODY MASS INDEX: 23 KG/M2

## 2017-07-25 DIAGNOSIS — K64.9 HEMORRHOIDS, UNSPECIFIED HEMORRHOID TYPE: ICD-10-CM

## 2017-07-25 DIAGNOSIS — R10.2 PERINEAL PAIN: Primary | ICD-10-CM

## 2017-07-25 PROCEDURE — 99213 OFFICE O/P EST LOW 20 MIN: CPT | Performed by: OBSTETRICS & GYNECOLOGY

## 2017-07-25 RX ORDER — FOLIC ACID 1 MG/1
TABLET ORAL DAILY
COMMUNITY
End: 2017-10-24

## 2017-07-26 NOTE — PROGRESS NOTES
Raphael MARIA 3/1/1985       Patient presents with:  Postpartum Care: 1 Week postpartum vaginal lump  pt delivered 1 week ago and had repair of a small 1st degree laceration. She is here today with c/o soreness when sitting.   I explained lesions and prolapse  Bladder:  No fullness, masses or tenderness  Vagina:  Normal appearance without lesions, no abnormal discharge  Cervix:  Normal without tenderness on motion  Uterus: normal in size, contour, position, mobility, without tenderness  Adn

## 2017-07-28 ENCOUNTER — HOSPITAL ENCOUNTER (EMERGENCY)
Facility: HOSPITAL | Age: 32
Discharge: HOME | End: 2017-07-29
Attending: EMERGENCY MEDICINE
Payer: MEDICAID

## 2017-07-28 DIAGNOSIS — N39.0 SEPSIS DUE TO URINARY TRACT INFECTION (HCC): Primary | ICD-10-CM

## 2017-07-28 DIAGNOSIS — A41.9 SEPSIS DUE TO URINARY TRACT INFECTION (HCC): Primary | ICD-10-CM

## 2017-07-28 LAB
ALBUMIN SERPL BCP-MCNC: 3 G/DL (ref 3.5–4.8)
ALBUMIN/GLOB SERPL: 0.9 {RATIO} (ref 1–2)
ALP SERPL-CCNC: 116 U/L (ref 32–100)
ALT SERPL-CCNC: 18 U/L (ref 14–54)
ANION GAP SERPL CALC-SCNC: 10 MMOL/L (ref 0–18)
AST SERPL-CCNC: 26 U/L (ref 15–41)
BACTERIA UR QL AUTO: NEGATIVE /HPF
BASOPHILS # BLD: 0 K/UL (ref 0–0.2)
BASOPHILS NFR BLD: 0 %
BILIRUB SERPL-MCNC: 0.3 MG/DL (ref 0.3–1.2)
BILIRUB UR QL: NEGATIVE
BILIRUB UR QL: NEGATIVE
BUN SERPL-MCNC: 7 MG/DL (ref 8–20)
BUN/CREAT SERPL: 9.5 (ref 10–20)
CALCIUM SERPL-MCNC: 8.4 MG/DL (ref 8.5–10.5)
CHLORIDE SERPL-SCNC: 102 MMOL/L (ref 95–110)
CLARITY UR: CLEAR
CO2 SERPL-SCNC: 23 MMOL/L (ref 22–32)
COLOR UR: YELLOW
COLOR UR: YELLOW
CREAT SERPL-MCNC: 0.74 MG/DL (ref 0.5–1.5)
EOSINOPHIL # BLD: 0 K/UL (ref 0–0.7)
EOSINOPHIL NFR BLD: 0 %
ERYTHROCYTE [DISTWIDTH] IN BLOOD BY AUTOMATED COUNT: 13.3 % (ref 11–15)
GLOBULIN PLAS-MCNC: 3.3 G/DL (ref 2.5–3.7)
GLUCOSE SERPL-MCNC: 119 MG/DL (ref 70–99)
GLUCOSE UR-MCNC: NEGATIVE MG/DL
GLUCOSE UR-MCNC: NEGATIVE MG/DL
HCT VFR BLD AUTO: 41.3 % (ref 35–48)
HGB BLD-MCNC: 14 G/DL (ref 12–16)
KETONES UR-MCNC: NEGATIVE MG/DL
KETONES UR-MCNC: NEGATIVE MG/DL
LYMPHOCYTES # BLD: 1.2 K/UL (ref 1–4)
LYMPHOCYTES NFR BLD: 9 %
MCH RBC QN AUTO: 32.8 PG (ref 27–32)
MCHC RBC AUTO-ENTMCNC: 33.9 G/DL (ref 32–37)
MCV RBC AUTO: 96.5 FL (ref 80–100)
MONOCYTES # BLD: 0.8 K/UL (ref 0–1)
MONOCYTES NFR BLD: 6 %
NEUTROPHILS # BLD AUTO: 11.5 K/UL (ref 1.8–7.7)
NEUTROPHILS NFR BLD: 85 %
NITRITE UR QL STRIP.AUTO: NEGATIVE
NITRITE UR QL STRIP.AUTO: NEGATIVE
OSMOLALITY UR CALC.SUM OF ELEC: 279 MOSM/KG (ref 275–295)
PH UR: 6 [PH] (ref 5–8)
PH UR: 8 [PH] (ref 5–8)
PLATELET # BLD AUTO: 257 K/UL (ref 140–400)
PMV BLD AUTO: 8.7 FL (ref 7.4–10.3)
POTASSIUM SERPL-SCNC: 3.6 MMOL/L (ref 3.3–5.1)
PROT SERPL-MCNC: 6.3 G/DL (ref 5.9–8.4)
PROT UR-MCNC: 30 MG/DL
PROT UR-MCNC: NEGATIVE MG/DL
RBC # BLD AUTO: 4.28 M/UL (ref 3.7–5.4)
RBC #/AREA URNS AUTO: 1 /HPF
RBC #/AREA URNS AUTO: 33 /HPF
SODIUM SERPL-SCNC: 135 MMOL/L (ref 136–144)
SP GR UR STRIP: 1.01 (ref 1–1.03)
SP GR UR STRIP: 1.02 (ref 1–1.03)
UROBILINOGEN UR STRIP-ACNC: <2
UROBILINOGEN UR STRIP-ACNC: <2
VIT C UR-MCNC: NEGATIVE MG/DL
VIT C UR-MCNC: NEGATIVE MG/DL
WBC # BLD AUTO: 13.5 K/UL (ref 4–11)
WBC #/AREA URNS AUTO: 27 /HPF
WBC #/AREA URNS AUTO: 301 /HPF

## 2017-07-28 PROCEDURE — 36415 COLL VENOUS BLD VENIPUNCTURE: CPT

## 2017-07-28 PROCEDURE — 96365 THER/PROPH/DIAG IV INF INIT: CPT

## 2017-07-28 PROCEDURE — 80053 COMPREHEN METABOLIC PANEL: CPT | Performed by: EMERGENCY MEDICINE

## 2017-07-28 PROCEDURE — 81001 URINALYSIS AUTO W/SCOPE: CPT | Performed by: EMERGENCY MEDICINE

## 2017-07-28 PROCEDURE — 87186 SC STD MICRODIL/AGAR DIL: CPT | Performed by: EMERGENCY MEDICINE

## 2017-07-28 PROCEDURE — 87088 URINE BACTERIA CULTURE: CPT | Performed by: EMERGENCY MEDICINE

## 2017-07-28 PROCEDURE — 87077 CULTURE AEROBIC IDENTIFY: CPT | Performed by: EMERGENCY MEDICINE

## 2017-07-28 PROCEDURE — 99284 EMERGENCY DEPT VISIT MOD MDM: CPT

## 2017-07-28 PROCEDURE — 87086 URINE CULTURE/COLONY COUNT: CPT | Performed by: EMERGENCY MEDICINE

## 2017-07-28 PROCEDURE — 96361 HYDRATE IV INFUSION ADD-ON: CPT

## 2017-07-28 PROCEDURE — 85025 COMPLETE CBC W/AUTO DIFF WBC: CPT | Performed by: EMERGENCY MEDICINE

## 2017-07-28 RX ORDER — ACETAMINOPHEN 500 MG
1000 TABLET ORAL ONCE
Status: COMPLETED | OUTPATIENT
Start: 2017-07-28 | End: 2017-07-28

## 2017-07-28 RX ORDER — CEPHALEXIN 250 MG/5ML
500 POWDER, FOR SUSPENSION ORAL 4 TIMES DAILY
Qty: 400 ML | Refills: 0 | Status: SHIPPED | OUTPATIENT
Start: 2017-07-28 | End: 2017-08-07

## 2017-07-28 RX ORDER — IBUPROFEN 600 MG/1
600 TABLET ORAL ONCE
Status: COMPLETED | OUTPATIENT
Start: 2017-07-28 | End: 2017-07-28

## 2017-07-29 VITALS
RESPIRATION RATE: 16 BRPM | SYSTOLIC BLOOD PRESSURE: 105 MMHG | HEART RATE: 89 BPM | WEIGHT: 138 LBS | OXYGEN SATURATION: 96 % | TEMPERATURE: 99 F | DIASTOLIC BLOOD PRESSURE: 52 MMHG | BODY MASS INDEX: 23.56 KG/M2 | HEIGHT: 64 IN

## 2017-07-29 PROCEDURE — 87040 BLOOD CULTURE FOR BACTERIA: CPT | Performed by: EMERGENCY MEDICINE

## 2017-07-29 NOTE — ED NOTES
Straight cath specimen collected with sterile technique per MD verbal order. Unable to add a new order for urinalysis- automatic cancel by lab. Ok per lab to send specimen down with hospital label.

## 2017-07-29 NOTE — ED PROVIDER NOTES
Patient Seen in: [facility] AND [facility] Emergency Department    History   Patient presents with:  Fever (infectious)    Stated Complaint: fever x1 day     HPI    Pt is 29 yo  s/p  11 days ago who p/w fever x 1 day.  Pt took tylenol at 5 pm. Pt denies dy systems reviewed and negative except as noted above. PSFH elements reviewed from today and agreed except as otherwise stated in HPI.     Physical Exam   ED Triage Vitals [07/28/17 2131]  BP: 120/59  Pulse: 123  Resp: 20  Temp: (!) 103 °F (39.4 °C)  Temp Abnormal; Notable for the following:     WBC 13.5 (*)     MCH 32.8 (*)     Neutrophil Absolute 11.5 (*)     All other components within normal limits   CBC WITH DIFFERENTIAL WITH PLATELET    Narrative:      The following orders were created for panel order daily.  Qty: 400 mL Refills: 0

## 2017-08-28 ENCOUNTER — POSTPARTUM (OUTPATIENT)
Dept: OBGYN CLINIC | Facility: CLINIC | Age: 32
End: 2017-08-28

## 2017-08-28 VITALS
HEART RATE: 52 BPM | DIASTOLIC BLOOD PRESSURE: 65 MMHG | BODY MASS INDEX: 22 KG/M2 | WEIGHT: 128 LBS | SYSTOLIC BLOOD PRESSURE: 103 MMHG

## 2017-08-28 PROCEDURE — 0503F POSTPARTUM CARE VISIT: CPT | Performed by: OBSTETRICS & GYNECOLOGY

## 2017-08-28 RX ORDER — CARBAMAZEPINE
POWDER (GRAM) MISCELLANEOUS
COMMUNITY
End: 2017-08-30

## 2017-08-28 RX ORDER — FOLIC ACID 1 MG/1
TABLET ORAL
COMMUNITY
End: 2017-08-30

## 2017-08-28 NOTE — PROGRESS NOTES
FORTINO Ann is a 28year old female [id number] here for 6 week post-partum visit. Patient delivered a  female infant on 7/17/17. Patient desires condoms for contraception. Patient is breast feeding.    Patient denies symptoms of depression healed perineum  Anus: no hemorroids       ASSESSMENT/PLAN    (Z39.2) Postpartum care and examination  (primary encounter diagnosis)  Plan:     Discussed all options of birthcontrol including ocps, minipill, Mirena or Paragard IUD, nuvaring, orthoevra patc

## 2017-08-30 ENCOUNTER — OFFICE VISIT (OUTPATIENT)
Dept: FAMILY MEDICINE CLINIC | Facility: CLINIC | Age: 32
End: 2017-08-30

## 2017-08-30 ENCOUNTER — HOSPITAL ENCOUNTER (OUTPATIENT)
Dept: GENERAL RADIOLOGY | Facility: HOSPITAL | Age: 32
Discharge: HOME | End: 2017-08-30
Attending: FAMILY MEDICINE
Payer: MEDICAID

## 2017-08-30 VITALS
DIASTOLIC BLOOD PRESSURE: 70 MMHG | WEIGHT: 130 LBS | BODY MASS INDEX: 22 KG/M2 | SYSTOLIC BLOOD PRESSURE: 107 MMHG | HEART RATE: 61 BPM

## 2017-08-30 DIAGNOSIS — M79.671 FOOT PAIN, RIGHT: Primary | ICD-10-CM

## 2017-08-30 DIAGNOSIS — M79.671 FOOT PAIN, RIGHT: ICD-10-CM

## 2017-08-30 PROCEDURE — 99213 OFFICE O/P EST LOW 20 MIN: CPT | Performed by: FAMILY MEDICINE

## 2017-08-30 PROCEDURE — 73620 X-RAY EXAM OF FOOT: CPT | Performed by: FAMILY MEDICINE

## 2017-08-30 PROCEDURE — 99212 OFFICE O/P EST SF 10 MIN: CPT | Performed by: FAMILY MEDICINE

## 2017-08-30 NOTE — PROGRESS NOTES
HPI:    Patient ID: Corrinne Laity is a 28year old female. RIGHT foot pain on the outer aspect and swelling. Worse after prolonged walking or standing. Was ignoring it for some time. But its getting worse.          Review of Systems   Constitu

## 2017-09-06 ENCOUNTER — TELEPHONE (OUTPATIENT)
Dept: FAMILY MEDICINE CLINIC | Facility: CLINIC | Age: 32
End: 2017-09-06

## 2017-09-06 DIAGNOSIS — R79.9 ABNORMAL BLOOD CHEMISTRY TEST: Primary | ICD-10-CM

## 2017-09-27 ENCOUNTER — TELEPHONE (OUTPATIENT)
Dept: OBGYN CLINIC | Facility: CLINIC | Age: 32
End: 2017-09-27

## 2017-09-27 NOTE — TELEPHONE ENCOUNTER
Informed pharmacist it is OK to substitute for the PNV that they have in stock. Pt delivered in august 2017.

## 2017-10-03 ENCOUNTER — OFFICE VISIT (OUTPATIENT)
Dept: NEUROLOGY | Facility: CLINIC | Age: 32
End: 2017-10-03

## 2017-10-03 VITALS
SYSTOLIC BLOOD PRESSURE: 110 MMHG | HEIGHT: 64 IN | RESPIRATION RATE: 16 BRPM | WEIGHT: 128 LBS | HEART RATE: 64 BPM | BODY MASS INDEX: 21.85 KG/M2 | DIASTOLIC BLOOD PRESSURE: 60 MMHG

## 2017-10-03 DIAGNOSIS — G40.909 SEIZURE DISORDER (HCC): Primary | ICD-10-CM

## 2017-10-03 PROCEDURE — 99213 OFFICE O/P EST LOW 20 MIN: CPT | Performed by: OTHER

## 2017-10-03 RX ORDER — CARBAMAZEPINE 200 MG/1
200 TABLET ORAL 3 TIMES DAILY
Qty: 90 TABLET | Refills: 12 | Status: SHIPPED | OUTPATIENT
Start: 2017-10-03 | End: 2017-11-02

## 2017-10-03 NOTE — PROGRESS NOTES
Jakub Kaiser : 3/1/1985     HPI:   Patient presents with:  Seizures: LOV 10/7/16. C/o last seizure 2015. Taking carbamazepine. Jakub Kaiser was seen in follow-up in my office October 3, 2017.   She is a 19-year-old right-handed Social History Main Topics    Smoking status: Never Smoker                                                                Smokeless tobacco: Never Used                        Alcohol use:  No              Drug use: No            Other Topics negative Romberg’s sign. Can stand on heels and toes. ASSESSMENT AND PLAN:     Shawnee Barriga 28year old female presents to clinic with history of a seizure disorder, most likely idiopathic.   She has been seizure-free for 2 years, and we discus

## 2017-10-13 ENCOUNTER — APPOINTMENT (OUTPATIENT)
Dept: CT IMAGING | Facility: HOSPITAL | Age: 32
End: 2017-10-13
Attending: EMERGENCY MEDICINE
Payer: MEDICAID

## 2017-10-13 ENCOUNTER — HOSPITAL ENCOUNTER (EMERGENCY)
Facility: HOSPITAL | Age: 32
Discharge: HOME | End: 2017-10-13
Attending: EMERGENCY MEDICINE
Payer: MEDICAID

## 2017-10-13 VITALS
WEIGHT: 120 LBS | SYSTOLIC BLOOD PRESSURE: 119 MMHG | OXYGEN SATURATION: 99 % | BODY MASS INDEX: 20.49 KG/M2 | HEART RATE: 92 BPM | TEMPERATURE: 99 F | RESPIRATION RATE: 12 BRPM | DIASTOLIC BLOOD PRESSURE: 79 MMHG | HEIGHT: 64 IN

## 2017-10-13 DIAGNOSIS — G40.919 BREAKTHROUGH SEIZURE (HCC): Primary | ICD-10-CM

## 2017-10-13 DIAGNOSIS — S09.90XA INJURY OF HEAD, INITIAL ENCOUNTER: ICD-10-CM

## 2017-10-13 PROCEDURE — 70450 CT HEAD/BRAIN W/O DYE: CPT | Performed by: EMERGENCY MEDICINE

## 2017-10-13 PROCEDURE — 82962 GLUCOSE BLOOD TEST: CPT

## 2017-10-13 PROCEDURE — 99284 EMERGENCY DEPT VISIT MOD MDM: CPT

## 2017-10-13 NOTE — ED INITIAL ASSESSMENT (HPI)
C/o Seizure lasting approximately 5 minutes, witnessed by mom. Pt rolled off mattress while seizing. Pt has hx of seizures, last seizure in April 2015. Pt is awake, alert, verbal, oriented. Denies pain.  BS 78

## 2017-10-14 NOTE — ED PROVIDER NOTES
Patient Seen in: [facility] Emergency Department    History   Patient presents with:  Seizure Disorder (neurologic)      HPI    Patient presents complaining of a seizure that occurred roughly 1 hour ago while she was at home on her bed.   Seizure w patient does not live in a home with stairs. Review of Systems   Constitutional: Negative for chills and fever. HENT: Negative for congestion and sore throat. Eyes: Negative for pain and visual disturbance.    Respiratory: Negative for cough Reviewed   POCT GLUCOSE - Abnormal; Notable for the following:        Result Value    POC Glucose  101 (*)     All other components within normal limits         Imaging Results Available and Reviewed while in ED: Ct Brain Or Head (09944)    Result Date: 10 the department: Stable    Disposition and Plan     Clinical Impression:  Breakthrough seizure ([facility] [address]  (primary encounter diagnosis)  Injury of head, initial encounter    Disposition:  Discharge    Follow-up:  Tyson Najjar, MD  [address] [place] [address]

## 2017-10-24 RX ORDER — FOLIC ACID 1 MG/1
TABLET ORAL
Qty: 60 TABLET | Refills: 11 | Status: SHIPPED | OUTPATIENT
Start: 2017-10-24 | End: 2018-10-03

## 2017-11-15 ENCOUNTER — LAB ENCOUNTER (OUTPATIENT)
Dept: LAB | Age: 32
End: 2017-11-15
Attending: FAMILY MEDICINE
Payer: MEDICAID

## 2017-11-15 ENCOUNTER — OFFICE VISIT (OUTPATIENT)
Dept: FAMILY MEDICINE CLINIC | Facility: CLINIC | Age: 32
End: 2017-11-15

## 2017-11-15 VITALS
SYSTOLIC BLOOD PRESSURE: 106 MMHG | DIASTOLIC BLOOD PRESSURE: 64 MMHG | HEIGHT: 64 IN | TEMPERATURE: 98 F | WEIGHT: 120 LBS | BODY MASS INDEX: 20.49 KG/M2 | HEART RATE: 64 BPM

## 2017-11-15 DIAGNOSIS — L65.9 HAIR LOSS: Primary | ICD-10-CM

## 2017-11-15 DIAGNOSIS — L65.9 HAIR LOSS: ICD-10-CM

## 2017-11-15 PROCEDURE — 86039 ANTINUCLEAR ANTIBODIES (ANA): CPT

## 2017-11-15 PROCEDURE — 80053 COMPREHEN METABOLIC PANEL: CPT

## 2017-11-15 PROCEDURE — 84443 ASSAY THYROID STIM HORMONE: CPT

## 2017-11-15 PROCEDURE — 85025 COMPLETE CBC W/AUTO DIFF WBC: CPT

## 2017-11-15 PROCEDURE — 99213 OFFICE O/P EST LOW 20 MIN: CPT | Performed by: FAMILY MEDICINE

## 2017-11-15 PROCEDURE — 99212 OFFICE O/P EST SF 10 MIN: CPT | Performed by: FAMILY MEDICINE

## 2017-11-15 PROCEDURE — 86038 ANTINUCLEAR ANTIBODIES: CPT

## 2017-11-15 PROCEDURE — 36415 COLL VENOUS BLD VENIPUNCTURE: CPT

## 2017-11-15 RX ORDER — CARBAMAZEPINE 200 MG/1
200 TABLET ORAL 3 TIMES DAILY
COMMUNITY
End: 2018-10-03

## 2017-11-16 NOTE — PROGRESS NOTES
HPI:    Patient ID: Jenny Dye is a 28year old female. Loosing hair in last few months. In July had babay. Has 4 children now,   itherwise feels well. More tired also         Review of Systems   Constitutional: Negative.   Negative for activ

## 2017-11-30 ENCOUNTER — LAB ENCOUNTER (OUTPATIENT)
Dept: LAB | Age: 32
End: 2017-11-30
Attending: FAMILY MEDICINE
Payer: MEDICAID

## 2017-11-30 ENCOUNTER — OFFICE VISIT (OUTPATIENT)
Dept: FAMILY MEDICINE CLINIC | Facility: CLINIC | Age: 32
End: 2017-11-30

## 2017-11-30 VITALS
WEIGHT: 118 LBS | HEART RATE: 62 BPM | BODY MASS INDEX: 20.14 KG/M2 | DIASTOLIC BLOOD PRESSURE: 62 MMHG | HEIGHT: 64 IN | SYSTOLIC BLOOD PRESSURE: 99 MMHG

## 2017-11-30 DIAGNOSIS — R42 DIZZINESS: Primary | ICD-10-CM

## 2017-11-30 DIAGNOSIS — L65.9 HAIR THINNING: ICD-10-CM

## 2017-11-30 PROCEDURE — 93005 ELECTROCARDIOGRAM TRACING: CPT

## 2017-11-30 PROCEDURE — 93010 ELECTROCARDIOGRAM REPORT: CPT | Performed by: FAMILY MEDICINE

## 2017-11-30 PROCEDURE — 99213 OFFICE O/P EST LOW 20 MIN: CPT | Performed by: FAMILY MEDICINE

## 2017-11-30 PROCEDURE — 99212 OFFICE O/P EST SF 10 MIN: CPT | Performed by: FAMILY MEDICINE

## 2017-12-11 NOTE — PROGRESS NOTES
HPI:    Patient ID: Carlos Rao is a 28year old female. Here for f/u test results. Has clark titer positive . Is speckled DNA positive,   Was done due to hair loss. Patient does feel tired but has several children at home.          Review o

## 2017-12-13 ENCOUNTER — APPOINTMENT (OUTPATIENT)
Dept: LAB | Age: 32
End: 2017-12-13
Attending: INTERNAL MEDICINE
Payer: MEDICAID

## 2017-12-13 ENCOUNTER — OFFICE VISIT (OUTPATIENT)
Dept: RHEUMATOLOGY | Facility: CLINIC | Age: 32
End: 2017-12-13

## 2017-12-13 VITALS
WEIGHT: 117.19 LBS | HEART RATE: 56 BPM | SYSTOLIC BLOOD PRESSURE: 117 MMHG | HEIGHT: 64 IN | BODY MASS INDEX: 20.01 KG/M2 | DIASTOLIC BLOOD PRESSURE: 70 MMHG

## 2017-12-13 DIAGNOSIS — L65.9 HAIR LOSS: Primary | ICD-10-CM

## 2017-12-13 DIAGNOSIS — R76.8 POSITIVE ANA (ANTINUCLEAR ANTIBODY): ICD-10-CM

## 2017-12-13 PROCEDURE — 86235 NUCLEAR ANTIGEN ANTIBODY: CPT

## 2017-12-13 PROCEDURE — 86160 COMPLEMENT ANTIGEN: CPT

## 2017-12-13 PROCEDURE — 86225 DNA ANTIBODY NATIVE: CPT

## 2017-12-13 PROCEDURE — 99244 OFF/OP CNSLTJ NEW/EST MOD 40: CPT | Performed by: INTERNAL MEDICINE

## 2017-12-13 PROCEDURE — 36415 COLL VENOUS BLD VENIPUNCTURE: CPT

## 2017-12-13 PROCEDURE — 99212 OFFICE O/P EST SF 10 MIN: CPT | Performed by: INTERNAL MEDICINE

## 2017-12-13 NOTE — PROGRESS NOTES
Dear Dr. Jen Swanson:    I saw your patient Araceli Johnson in consultation this afternoon at your request for evaluation of alopecia and positive RICK. As you know, she is a delightful 51-year-old woman who delivered a baby July 17th, 2017.   Afterwards, cervical adenopathy. Lungs clear. S1 and S2 regular. Abdomen without hepatosplenomegaly or tenderness. Normal bowel sounds. No lower extremity edema. Neck moves well.   Shoulders, elbows, wrists, and hands are normal.  Hips, knees, and ankles are norm

## 2017-12-29 ENCOUNTER — TELEPHONE (OUTPATIENT)
Dept: RHEUMATOLOGY | Facility: CLINIC | Age: 32
End: 2017-12-29

## 2017-12-29 NOTE — TELEPHONE ENCOUNTER
I called and left a message with her sister Nicolás Briones. Her complements and specific lupus antibodies are negative. She was reassured that Irnee Garcia does not have lupus. I will also send a letter.

## 2018-03-23 ENCOUNTER — OFFICE VISIT (OUTPATIENT)
Dept: FAMILY MEDICINE CLINIC | Facility: CLINIC | Age: 33
End: 2018-03-23

## 2018-03-23 VITALS
WEIGHT: 113 LBS | SYSTOLIC BLOOD PRESSURE: 99 MMHG | BODY MASS INDEX: 19 KG/M2 | HEART RATE: 60 BPM | DIASTOLIC BLOOD PRESSURE: 60 MMHG | TEMPERATURE: 98 F

## 2018-03-23 DIAGNOSIS — R10.84 GENERALIZED ABDOMINAL PAIN: Primary | ICD-10-CM

## 2018-03-23 PROCEDURE — 99213 OFFICE O/P EST LOW 20 MIN: CPT | Performed by: FAMILY MEDICINE

## 2018-03-23 PROCEDURE — 99212 OFFICE O/P EST SF 10 MIN: CPT | Performed by: FAMILY MEDICINE

## 2018-03-23 NOTE — PROGRESS NOTES
HPI:    Patient ID: Juan Jose Bateman is a 35year old female. HPI  Patient presents with:  Abdominal Pain: abdominal pain at times, feels like cramps with movement with pain for 1month, is currently breast feeding   No weight changes.  Very minimal

## 2018-04-10 ENCOUNTER — TELEPHONE (OUTPATIENT)
Dept: OTHER | Age: 33
End: 2018-04-10

## 2018-04-10 ENCOUNTER — TELEPHONE (OUTPATIENT)
Dept: OBGYN CLINIC | Facility: CLINIC | Age: 33
End: 2018-04-10

## 2018-04-10 NOTE — TELEPHONE ENCOUNTER
Pt is spotting, states breast feeding and has never had a period while breastfeeding. Has questions. pls adv.

## 2018-04-10 NOTE — TELEPHONE ENCOUNTER
LOV 3/23/18 for abd pain. abd pain stopped. Currently breast feeding. \" feeling some movement\"  Today started  Vaginal spotting  pink in color. Denies cramps, clots. Took home pregnancy test 2 days ago and was negative.   Did not call gynecologist.

## 2018-04-10 NOTE — TELEPHONE ENCOUNTER
Pt states she is breastfeeding and just started spotting. She has had no period since delivery. She also feels abdominal pain and some \"movement\".  She states she thought she might be pregnant so she went to her PCP who said it could be \"hormones or that

## 2018-04-11 ENCOUNTER — OFFICE VISIT (OUTPATIENT)
Dept: OBGYN CLINIC | Facility: CLINIC | Age: 33
End: 2018-04-11

## 2018-04-11 VITALS
SYSTOLIC BLOOD PRESSURE: 111 MMHG | WEIGHT: 114 LBS | HEART RATE: 67 BPM | BODY MASS INDEX: 20 KG/M2 | DIASTOLIC BLOOD PRESSURE: 69 MMHG

## 2018-04-11 DIAGNOSIS — N91.2 AMENORRHEA: Primary | ICD-10-CM

## 2018-04-11 PROCEDURE — 99213 OFFICE O/P EST LOW 20 MIN: CPT | Performed by: OBSTETRICS & GYNECOLOGY

## 2018-04-11 PROCEDURE — 81025 URINE PREGNANCY TEST: CPT | Performed by: OBSTETRICS & GYNECOLOGY

## 2018-04-11 NOTE — PROGRESS NOTES
HPI:    Patient ID: Rivka High is a 35year old female. HPI  S/P  17 and no menses since. Still lactating. Condoms for BC. In March she reports diffuse abdominal pain for single day duration but happening 3-4 x in the month.

## 2018-04-22 PROBLEM — R11.10 VOMITING: Status: RESOLVED | Noted: 2017-04-22 | Resolved: 2018-04-22

## 2018-04-22 PROBLEM — O47.00 PRETERM CONTRACTIONS (HCC): Status: RESOLVED | Noted: 2017-04-21 | Resolved: 2018-04-22

## 2018-04-22 PROBLEM — Z34.90 PREGNANCY (HCC): Status: RESOLVED | Noted: 2017-04-18 | Resolved: 2018-04-22

## 2018-04-22 PROBLEM — Z36.89 ULTRASOUND SCAN TO CHECK INTERVAL GROWTH OF FETUS: Status: RESOLVED | Noted: 2017-03-17 | Resolved: 2018-04-22

## 2018-04-22 PROBLEM — O21.9 VOMITING PREGNANCY: Status: RESOLVED | Noted: 2017-04-22 | Resolved: 2018-04-22

## 2018-04-22 PROBLEM — O47.00 PRETERM CONTRACTIONS: Status: RESOLVED | Noted: 2017-04-21 | Resolved: 2018-04-22

## 2018-04-22 PROBLEM — Z34.90 PREGNANCY: Status: RESOLVED | Noted: 2017-04-18 | Resolved: 2018-04-22

## 2018-04-22 PROBLEM — O21.9: Status: RESOLVED | Noted: 2017-04-22 | Resolved: 2018-04-22

## 2018-08-07 ENCOUNTER — HOSPITAL ENCOUNTER (OUTPATIENT)
Age: 33
Discharge: HOME | End: 2018-08-07
Attending: FAMILY MEDICINE
Payer: MEDICAID

## 2018-08-07 VITALS
OXYGEN SATURATION: 99 % | HEIGHT: 64 IN | TEMPERATURE: 98 F | DIASTOLIC BLOOD PRESSURE: 74 MMHG | SYSTOLIC BLOOD PRESSURE: 114 MMHG | BODY MASS INDEX: 18.78 KG/M2 | HEART RATE: 64 BPM | RESPIRATION RATE: 18 BRPM | WEIGHT: 110 LBS

## 2018-08-07 DIAGNOSIS — Z87.898 HISTORY OF NECK SWELLING: Primary | ICD-10-CM

## 2018-08-07 PROCEDURE — 99212 OFFICE O/P EST SF 10 MIN: CPT

## 2018-08-07 PROCEDURE — 99211 OFF/OP EST MAY X REQ PHY/QHP: CPT

## 2018-08-08 ENCOUNTER — TELEPHONE (OUTPATIENT)
Dept: FAMILY MEDICINE CLINIC | Facility: CLINIC | Age: 33
End: 2018-08-08

## 2018-08-08 DIAGNOSIS — R68.89 NECK PROBLEM: Primary | ICD-10-CM

## 2018-08-08 NOTE — TELEPHONE ENCOUNTER
Hi Dr. Argelia Langford,    Patient in [address] [place] [address] for swollen in neck after eating. IC doctor told her to see an ENT, she would like to see Dr. Steven Ellington. Please enter an order as her insurance doesn't require referrals.      Thank you,   [facility]

## 2018-08-08 NOTE — ED INITIAL ASSESSMENT (HPI)
Today started with a feeling when she eats that there is swelling under the left right side under her tongue that changes to a feeling that the swelling is under her jaw. No fever, sore throat, trouble swallowing.     After breakfast it eased up and return

## 2018-08-08 NOTE — ED PROVIDER NOTES
Patient Seen in: [facility] AND CLINICS Immediate Care In [address] [place] [address]    History   Patient presents with:  Sore Throat    Stated Complaint: swellen tonsil     HPI    Today felt a lump under the right side of the tongue that goes to the right neck with swallowing cervical adenopathy. Neurological: She is alert and oriented to person, place, and time. Skin: Skin is warm. No rash noted. She is not diaphoretic. No erythema. Psychiatric: She has a normal mood and affect.  Her behavior is normal.   Nursing note and

## 2018-08-11 ENCOUNTER — OFFICE VISIT (OUTPATIENT)
Dept: OTOLARYNGOLOGY | Facility: CLINIC | Age: 33
End: 2018-08-11
Payer: MEDICAID

## 2018-08-11 VITALS
HEIGHT: 64 IN | SYSTOLIC BLOOD PRESSURE: 115 MMHG | TEMPERATURE: 98 F | DIASTOLIC BLOOD PRESSURE: 66 MMHG | WEIGHT: 110 LBS | BODY MASS INDEX: 18.78 KG/M2

## 2018-08-11 DIAGNOSIS — K11.20 SIALOADENITIS OF SUBMANDIBULAR GLAND: Primary | ICD-10-CM

## 2018-08-11 PROCEDURE — 99203 OFFICE O/P NEW LOW 30 MIN: CPT | Performed by: OTOLARYNGOLOGY

## 2018-08-11 PROCEDURE — 99212 OFFICE O/P EST SF 10 MIN: CPT | Performed by: OTOLARYNGOLOGY

## 2018-08-11 RX ORDER — AMOXICILLIN AND CLAVULANATE POTASSIUM 875; 125 MG/1; MG/1
1 TABLET, FILM COATED ORAL EVERY 12 HOURS
Qty: 20 TABLET | Refills: 0 | Status: SHIPPED | OUTPATIENT
Start: 2018-08-11 | End: 2019-02-08

## 2018-08-11 RX ORDER — METHYLPREDNISOLONE 4 MG/1
TABLET ORAL
Qty: 1 PACKAGE | Refills: 0 | Status: SHIPPED | OUTPATIENT
Start: 2018-08-11 | End: 2019-02-08

## 2018-08-11 NOTE — PROGRESS NOTES
Virgina Ahumada is a 35year old female.   Patient presents with:  Swelling: right side of tongue and under tongue and throat since Tuesday       HISTORY OF PRESENT ILLNESS    She presents with a history of sudden sensation of sharp discomfort in the   3/18/2016: VENTRAL HERNIA REPAIR      Comment: (Supraumbilical/umbilical)      REVIEW OF SYSTEMS    System Neg/Pos Details   Constitutional Negative Fatigue, fever and weight loss. ENMT Negative Drooling.    Eyes Negative Blurred vision and vision c Tonsils - Normal. Oropharynx - Normal.   Nose/Mouth/Throat Normal Nares - Right: Normal Left: Normal. Septum -Normal  Turbinates - Right: Normal, Left: Normal.       Current Outpatient Prescriptions:   •  Amoxicillin-Pot Clavulanate 875-125 MG Oral Tab, Ta

## 2018-08-23 ENCOUNTER — OFFICE VISIT (OUTPATIENT)
Dept: OTOLARYNGOLOGY | Facility: CLINIC | Age: 33
End: 2018-08-23
Payer: MEDICAID

## 2018-08-23 VITALS
BODY MASS INDEX: 15.75 KG/M2 | TEMPERATURE: 98 F | WEIGHT: 110 LBS | DIASTOLIC BLOOD PRESSURE: 64 MMHG | HEIGHT: 70 IN | SYSTOLIC BLOOD PRESSURE: 106 MMHG

## 2018-08-23 DIAGNOSIS — K11.20 SIALOADENITIS OF SUBMANDIBULAR GLAND: Primary | ICD-10-CM

## 2018-08-23 PROCEDURE — 99214 OFFICE O/P EST MOD 30 MIN: CPT | Performed by: OTOLARYNGOLOGY

## 2018-08-23 PROCEDURE — 99212 OFFICE O/P EST SF 10 MIN: CPT | Performed by: OTOLARYNGOLOGY

## 2018-08-23 NOTE — PROGRESS NOTES
Kenji Dumont is a 35year old female. Patient presents with:   Follow - Up: Sialoadenitis of submandibular gland: pt reports swelling has subsided, feeling  a lot better, no pain       HISTORY OF PRESENT ILLNESS  She presents with a history of nicholson Diagnosis Date   • Environmental allergies    • History of pregnancy , ,        • Seasonal allergies    • Seizure disorder [facility])     last seizure 2015       Past Surgical History:  No date: HERNIA SURGERY  No date:   3/18/2016: Left: Normal.   Skin Normal Inspection - Normal.        Lymph Detail Normal Submental. Submandibular. Anterior cervical. Posterior cervical. Supraclavicular.         Nose/Mouth/Throat Normal External nose - Normal. Lips/teeth/gums - Normal. Tonsils - Normal

## 2018-08-24 ENCOUNTER — HOSPITAL ENCOUNTER (EMERGENCY)
Facility: HOSPITAL | Age: 33
Discharge: HOME | End: 2018-08-24
Attending: PHYSICIAN ASSISTANT
Payer: MEDICAID

## 2018-08-24 VITALS
OXYGEN SATURATION: 99 % | HEIGHT: 64 IN | HEART RATE: 61 BPM | BODY MASS INDEX: 18.78 KG/M2 | SYSTOLIC BLOOD PRESSURE: 111 MMHG | TEMPERATURE: 97 F | DIASTOLIC BLOOD PRESSURE: 69 MMHG | RESPIRATION RATE: 18 BRPM | WEIGHT: 110 LBS

## 2018-08-24 DIAGNOSIS — Z91.038 ALLERGIC REACTION TO INSECT BITE: Primary | ICD-10-CM

## 2018-08-24 PROCEDURE — 99283 EMERGENCY DEPT VISIT LOW MDM: CPT

## 2018-08-24 RX ORDER — CEPHALEXIN 500 MG/1
500 CAPSULE ORAL 3 TIMES DAILY
Qty: 21 CAPSULE | Refills: 0 | Status: SHIPPED | OUTPATIENT
Start: 2018-08-24 | End: 2018-08-31

## 2018-08-24 RX ORDER — DIPHENHYDRAMINE HCL 25 MG
CAPSULE ORAL EVERY 6 HOURS PRN
Qty: 40 CAPSULE | Refills: 0 | Status: SHIPPED | OUTPATIENT
Start: 2018-08-24 | End: 2018-08-29

## 2018-08-24 NOTE — ED INITIAL ASSESSMENT (HPI)
Pt c/o bite to right forearm that she noticed this afternoon, progressively got more swollen and itchy, states now her forearm feels \"heavy\".  Denies other complaints

## 2018-10-03 ENCOUNTER — OFFICE VISIT (OUTPATIENT)
Dept: NEUROLOGY | Facility: CLINIC | Age: 33
End: 2018-10-03
Payer: MEDICAID

## 2018-10-03 ENCOUNTER — APPOINTMENT (OUTPATIENT)
Dept: LAB | Facility: HOSPITAL | Age: 33
End: 2018-10-03
Attending: Other
Payer: MEDICAID

## 2018-10-03 VITALS
HEIGHT: 64 IN | HEART RATE: 77 BPM | RESPIRATION RATE: 16 BRPM | WEIGHT: 110 LBS | BODY MASS INDEX: 18.78 KG/M2 | DIASTOLIC BLOOD PRESSURE: 76 MMHG | SYSTOLIC BLOOD PRESSURE: 112 MMHG

## 2018-10-03 DIAGNOSIS — G40.909 SEIZURE DISORDER (HCC): Primary | ICD-10-CM

## 2018-10-03 DIAGNOSIS — G40.909 SEIZURE DISORDER (HCC): ICD-10-CM

## 2018-10-03 PROCEDURE — 36415 COLL VENOUS BLD VENIPUNCTURE: CPT | Performed by: OTHER

## 2018-10-03 PROCEDURE — 99215 OFFICE O/P EST HI 40 MIN: CPT | Performed by: OTHER

## 2018-10-03 PROCEDURE — 80156 ASSAY CARBAMAZEPINE TOTAL: CPT

## 2018-10-03 PROCEDURE — 84450 TRANSFERASE (AST) (SGOT): CPT

## 2018-10-03 PROCEDURE — 84460 ALANINE AMINO (ALT) (SGPT): CPT

## 2018-10-03 PROCEDURE — 85025 COMPLETE CBC W/AUTO DIFF WBC: CPT | Performed by: OTHER

## 2018-10-03 RX ORDER — FOLIC ACID 1 MG/1
1 TABLET ORAL 2 TIMES DAILY
Qty: 60 TABLET | Refills: 11 | Status: SHIPPED | OUTPATIENT
Start: 2018-10-03 | End: 2019-10-24

## 2018-10-03 RX ORDER — CARBAMAZEPINE 200 MG/1
200 TABLET ORAL 3 TIMES DAILY
Qty: 270 TABLET | Refills: 3 | Status: SHIPPED | OUTPATIENT
Start: 2018-10-03 | End: 2019-10-24

## 2018-10-03 NOTE — PROGRESS NOTES
She relates that her  told her she had a seizure in her sleep last Friday. She did seizure in October 2017. She has undescribable warning prior to her seizure. She is not planning on having any more children.   I did relate Tegretol, Depakote may

## 2019-02-08 ENCOUNTER — OFFICE VISIT (OUTPATIENT)
Dept: INTERNAL MEDICINE CLINIC | Facility: CLINIC | Age: 34
End: 2019-02-08
Payer: MEDICAID

## 2019-02-08 VITALS
BODY MASS INDEX: 18.78 KG/M2 | RESPIRATION RATE: 18 BRPM | DIASTOLIC BLOOD PRESSURE: 71 MMHG | SYSTOLIC BLOOD PRESSURE: 113 MMHG | HEART RATE: 71 BPM | HEIGHT: 64 IN | WEIGHT: 110 LBS

## 2019-02-08 DIAGNOSIS — Z00.00 PHYSICAL EXAM: Primary | ICD-10-CM

## 2019-02-08 DIAGNOSIS — N92.6 IRREGULAR PERIODS: ICD-10-CM

## 2019-02-08 LAB
CONTROL LINE PRESENT WITH A CLEAR BACKGROUND (YES/NO): YES YES/NO
KIT LOT #: NORMAL NUMERIC
PREGNANCY TEST, URINE: NEGATIVE

## 2019-02-08 PROCEDURE — 81025 URINE PREGNANCY TEST: CPT | Performed by: INTERNAL MEDICINE

## 2019-02-08 PROCEDURE — 99395 PREV VISIT EST AGE 18-39: CPT | Performed by: INTERNAL MEDICINE

## 2019-02-08 NOTE — PATIENT INSTRUCTIONS
Prevention Guidelines, Women Ages 25 to 44  Screening tests and vaccines are an important part of managing your health. A screening test is done to find possible disorders or diseases in people who don't have any symptoms.  The goal is to find a disease e Type 2 diabetes All women with prediabetes Every year   Gonorrhea Sexually active women at increased risk for infection At routine exams   Hepatitis C Anyone at increased risk At routine exams   HIV All women should be tested at least once for HIV between Meningococcal Women at increased risk for infection should talk with their healthcare provider 1 or more doses   Pneumococcal conjugate vaccine (PCV13) and pneumococcal polysaccharide vaccine (PPSV23) Women at increased risk for infection should talk with © 6466-2969 [employer] [address] [facility] [place], [facility] [place]. All rights reserved. This information is not intended as a substitute for professional medical care. Always follow your healthcare professional's instructions.

## 2019-02-08 NOTE — PROGRESS NOTES
Carlos Rao is a 35year old female.   Patient presents with:  Physical      HPI:   Comes for a physical  C/c physical  C/o had 2 periods in jan - usually reg -- does admit to increased stress     PMH  sz-- sees dr Yaz Saxena Outpatien Size: adult)   Pulse 71   Resp 18   Ht 5' 4\" (1.626 m)   Wt 110 lb (49.9 kg)   BMI 18.88 kg/m²   GENERAL: well developed, well nourished,in no apparent distress  SKIN: no rashes,no suspicious lesions  HEENT: atraumatic, normocephalic,ears and throat are c

## 2019-02-09 ENCOUNTER — LAB ENCOUNTER (OUTPATIENT)
Dept: LAB | Age: 34
End: 2019-02-09
Attending: INTERNAL MEDICINE
Payer: MEDICAID

## 2019-02-09 DIAGNOSIS — Z00.00 PHYSICAL EXAM: ICD-10-CM

## 2019-02-09 LAB
ALBUMIN SERPL BCP-MCNC: 4.2 G/DL (ref 3.5–4.8)
ALBUMIN/GLOB SERPL: 1.5 {RATIO} (ref 1–2)
ALP SERPL-CCNC: 82 U/L (ref 32–100)
ALT SERPL-CCNC: 14 U/L (ref 14–54)
ANION GAP SERPL CALC-SCNC: 9 MMOL/L (ref 0–18)
AST SERPL-CCNC: 19 U/L (ref 15–41)
BASOPHILS # BLD AUTO: 0.03 X10(3) UL (ref 0–0.2)
BASOPHILS NFR BLD AUTO: 0.6 %
BILIRUB SERPL-MCNC: 0.4 MG/DL (ref 0.3–1.2)
BUN SERPL-MCNC: 9 MG/DL (ref 8–20)
BUN/CREAT SERPL: 15.3 (ref 10–20)
CALCIUM SERPL-MCNC: 9.2 MG/DL (ref 8.5–10.5)
CHLORIDE SERPL-SCNC: 105 MMOL/L (ref 95–110)
CHOLEST SERPL-MCNC: 214 MG/DL (ref 110–200)
CO2 SERPL-SCNC: 26 MMOL/L (ref 22–32)
CREAT SERPL-MCNC: 0.59 MG/DL (ref 0.5–1.5)
DEPRECATED RDW RBC AUTO: 42.7 FL (ref 35.1–46.3)
EOSINOPHIL # BLD AUTO: 0.09 X10(3) UL (ref 0–0.7)
EOSINOPHIL NFR BLD AUTO: 1.8 %
ERYTHROCYTE [DISTWIDTH] IN BLOOD BY AUTOMATED COUNT: 12.8 % (ref 11–15)
GLOBULIN PLAS-MCNC: 2.8 G/DL (ref 2.5–3.7)
GLUCOSE SERPL-MCNC: 77 MG/DL (ref 70–99)
HCT VFR BLD AUTO: 41.6 % (ref 35–48)
HDLC SERPL-MCNC: 93 MG/DL
HGB BLD-MCNC: 13.9 G/DL (ref 12–16)
IMM GRANULOCYTES # BLD AUTO: 0.01 X10(3) UL (ref 0–1)
IMM GRANULOCYTES NFR BLD: 0.2 %
LDLC SERPL CALC-MCNC: 104 MG/DL (ref 0–99)
LYMPHOCYTES # BLD AUTO: 1.95 X10(3) UL (ref 1–4)
LYMPHOCYTES NFR BLD AUTO: 38.2 %
MCH RBC QN AUTO: 30.8 PG (ref 26–34)
MCHC RBC AUTO-ENTMCNC: 33.4 G/DL (ref 31–37)
MCV RBC AUTO: 92.2 FL (ref 80–100)
MONOCYTES # BLD AUTO: 0.28 X10(3) UL (ref 0.1–1)
MONOCYTES NFR BLD AUTO: 5.5 %
NEUTROPHILS # BLD AUTO: 2.75 X10 (3) UL (ref 1.5–7.7)
NEUTROPHILS # BLD AUTO: 2.75 X10(3) UL (ref 1.5–7.7)
NEUTROPHILS NFR BLD AUTO: 53.7 %
NONHDLC SERPL-MCNC: 121 MG/DL
OSMOLALITY UR CALC.SUM OF ELEC: 287 MOSM/KG (ref 275–295)
PATIENT FASTING: YES
PLATELET # BLD AUTO: 283 10(3)UL (ref 150–450)
POTASSIUM SERPL-SCNC: 3.7 MMOL/L (ref 3.3–5.1)
PROT SERPL-MCNC: 7 G/DL (ref 5.9–8.4)
RBC # BLD AUTO: 4.51 X10(6)UL (ref 3.8–5.3)
SODIUM SERPL-SCNC: 140 MMOL/L (ref 136–144)
TRIGL SERPL-MCNC: 85 MG/DL (ref 1–149)
TSH SERPL-ACNC: 0.69 UIU/ML (ref 0.45–5.33)
WBC # BLD AUTO: 5.1 X10(3) UL (ref 4–11)

## 2019-02-09 PROCEDURE — 84443 ASSAY THYROID STIM HORMONE: CPT

## 2019-02-09 PROCEDURE — 36415 COLL VENOUS BLD VENIPUNCTURE: CPT

## 2019-02-09 PROCEDURE — 85025 COMPLETE CBC W/AUTO DIFF WBC: CPT

## 2019-02-09 PROCEDURE — 80053 COMPREHEN METABOLIC PANEL: CPT

## 2019-02-09 PROCEDURE — 80061 LIPID PANEL: CPT

## 2019-02-12 ENCOUNTER — TELEPHONE (OUTPATIENT)
Dept: OTHER | Age: 34
End: 2019-02-12

## 2019-02-12 NOTE — TELEPHONE ENCOUNTER
Verified name and .   Results/recommendations reviewed with pt and pt verb understanding                    Notes recorded by Jessie Gee CMA on 2019 at 11:22 AM CST  Copy of labs mailed to pt home.  ------    Notes recorded by Garrison Tinoco MD o

## 2019-04-24 ENCOUNTER — OFFICE VISIT (OUTPATIENT)
Dept: OBGYN CLINIC | Facility: CLINIC | Age: 34
End: 2019-04-24
Payer: COMMERCIAL

## 2019-04-24 VITALS
WEIGHT: 110 LBS | DIASTOLIC BLOOD PRESSURE: 69 MMHG | SYSTOLIC BLOOD PRESSURE: 107 MMHG | BODY MASS INDEX: 19 KG/M2 | HEART RATE: 62 BPM

## 2019-04-24 DIAGNOSIS — Z01.419 ENCOUNTER FOR GYNECOLOGICAL EXAMINATION WITHOUT ABNORMAL FINDING: Primary | ICD-10-CM

## 2019-04-24 DIAGNOSIS — Z12.4 SCREENING FOR MALIGNANT NEOPLASM OF CERVIX: ICD-10-CM

## 2019-04-24 PROCEDURE — 99395 PREV VISIT EST AGE 18-39: CPT | Performed by: OBSTETRICS & GYNECOLOGY

## 2019-04-24 NOTE — PROGRESS NOTES
HPI:    Patient ID: Elizabeth Bermudez is a 29year old female. HPI  G4  with regular menses and condoms for [facility]. No new family or personal medical issues.      Review of Systems   Constitutional: Negative for appetite change, fatigue and unexpec labia. Uterus is not enlarged and not tender. Cervix exhibits no motion tenderness and no discharge. Right adnexum displays no mass, no tenderness and no fullness. Left adnexum displays no mass, no tenderness and no fullness. No vaginal discharge found.

## 2019-05-16 ENCOUNTER — TELEPHONE (OUTPATIENT)
Dept: OBGYN CLINIC | Facility: CLINIC | Age: 34
End: 2019-05-16

## 2019-05-16 NOTE — TELEPHONE ENCOUNTER
Pt came and in and wanted to her lab results from annual visit with dr. Joe Jameson on 4/24/19.  Please call her and leave a detailed msg if unable to answer she said ok to let her know on voicemail

## 2019-10-24 DIAGNOSIS — G40.909 SEIZURE DISORDER (HCC): ICD-10-CM

## 2019-10-24 RX ORDER — FOLIC ACID 1 MG/1
1 TABLET ORAL 2 TIMES DAILY
Qty: 60 TABLET | Refills: 2 | Status: SHIPPED | OUTPATIENT
Start: 2019-10-24 | End: 2019-12-03

## 2019-10-24 RX ORDER — CARBAMAZEPINE 200 MG/1
200 TABLET ORAL 3 TIMES DAILY
Qty: 90 TABLET | Refills: 2 | Status: SHIPPED | OUTPATIENT
Start: 2019-10-24 | End: 2019-12-03

## 2019-10-24 NOTE — TELEPHONE ENCOUNTER
Folic Acid 1mg. Take 1 tablet BID.      Carbamazepine 200mg Take 1 tablet TID    Last filled-10/18/2018 for 1 year supply    LOV-10/18/2018  NOV-12/3/2019

## 2019-12-03 ENCOUNTER — OFFICE VISIT (OUTPATIENT)
Dept: NEUROLOGY | Facility: CLINIC | Age: 34
End: 2019-12-03
Payer: COMMERCIAL

## 2019-12-03 ENCOUNTER — APPOINTMENT (OUTPATIENT)
Dept: LAB | Facility: HOSPITAL | Age: 34
End: 2019-12-03
Attending: Other
Payer: COMMERCIAL

## 2019-12-03 VITALS
BODY MASS INDEX: 18.78 KG/M2 | DIASTOLIC BLOOD PRESSURE: 62 MMHG | WEIGHT: 110 LBS | HEIGHT: 64 IN | SYSTOLIC BLOOD PRESSURE: 92 MMHG | HEART RATE: 88 BPM

## 2019-12-03 DIAGNOSIS — G40.909 SEIZURE DISORDER (HCC): ICD-10-CM

## 2019-12-03 PROCEDURE — 85025 COMPLETE CBC W/AUTO DIFF WBC: CPT | Performed by: OTHER

## 2019-12-03 PROCEDURE — 99213 OFFICE O/P EST LOW 20 MIN: CPT | Performed by: OTHER

## 2019-12-03 PROCEDURE — 84450 TRANSFERASE (AST) (SGOT): CPT

## 2019-12-03 PROCEDURE — 84460 ALANINE AMINO (ALT) (SGPT): CPT

## 2019-12-03 PROCEDURE — 80156 ASSAY CARBAMAZEPINE TOTAL: CPT

## 2019-12-03 PROCEDURE — 36415 COLL VENOUS BLD VENIPUNCTURE: CPT | Performed by: OTHER

## 2019-12-03 RX ORDER — CARBAMAZEPINE 200 MG/1
200 TABLET ORAL 3 TIMES DAILY
Qty: 270 TABLET | Refills: 3 | Status: SHIPPED | OUTPATIENT
Start: 2019-12-03 | End: 2020-11-24

## 2019-12-03 RX ORDER — FOLIC ACID 1 MG/1
1 TABLET ORAL 2 TIMES DAILY
Qty: 180 TABLET | Refills: 3 | Status: SHIPPED
Start: 2019-12-03 | End: 2021-02-16

## 2019-12-03 NOTE — PROGRESS NOTES
Ms Johnny Key, was in the office with her 2 children. She has had 2 nocturnal seizures over the last year. They precipitated by lack of sleep. She denies headache, cervical, lumbosacral spine pain. No focal motor, sensory symptoms in the arms or legs.   She

## 2020-02-29 ENCOUNTER — OFFICE VISIT (OUTPATIENT)
Dept: FAMILY MEDICINE CLINIC | Facility: CLINIC | Age: 35
End: 2020-02-29
Payer: COMMERCIAL

## 2020-02-29 VITALS
BODY MASS INDEX: 18.61 KG/M2 | RESPIRATION RATE: 16 BRPM | TEMPERATURE: 98 F | WEIGHT: 109 LBS | DIASTOLIC BLOOD PRESSURE: 75 MMHG | HEIGHT: 64 IN | HEART RATE: 59 BPM | SYSTOLIC BLOOD PRESSURE: 120 MMHG

## 2020-02-29 DIAGNOSIS — L30.9 DERMATITIS: Primary | ICD-10-CM

## 2020-02-29 PROCEDURE — 99213 OFFICE O/P EST LOW 20 MIN: CPT | Performed by: FAMILY MEDICINE

## 2020-02-29 NOTE — PROGRESS NOTES
HPI:    Patient ID: Darline Reyes is a 29year old female. HPI  Patient presents with:  Derm Problem: Patient present with a rash under right arm and right breast     Review of Systems   Constitutional: Negative. Skin: Positive for rash.

## 2020-11-24 DIAGNOSIS — G40.909 SEIZURE DISORDER (HCC): ICD-10-CM

## 2020-11-24 RX ORDER — CARBAMAZEPINE 200 MG/1
TABLET ORAL
Qty: 270 TABLET | Refills: 0 | Status: SHIPPED | OUTPATIENT
Start: 2020-11-24 | End: 2021-02-12

## 2020-12-02 ENCOUNTER — OFFICE VISIT (OUTPATIENT)
Dept: FAMILY MEDICINE CLINIC | Facility: CLINIC | Age: 35
End: 2020-12-02
Payer: COMMERCIAL

## 2020-12-02 VITALS
DIASTOLIC BLOOD PRESSURE: 62 MMHG | HEART RATE: 71 BPM | WEIGHT: 114 LBS | OXYGEN SATURATION: 100 % | HEIGHT: 64 IN | BODY MASS INDEX: 19.46 KG/M2 | RESPIRATION RATE: 16 BRPM | SYSTOLIC BLOOD PRESSURE: 105 MMHG

## 2020-12-02 DIAGNOSIS — L30.9 DERMATITIS: Primary | ICD-10-CM

## 2020-12-02 PROCEDURE — 99213 OFFICE O/P EST LOW 20 MIN: CPT | Performed by: PHYSICIAN ASSISTANT

## 2020-12-02 PROCEDURE — 3008F BODY MASS INDEX DOCD: CPT | Performed by: PHYSICIAN ASSISTANT

## 2020-12-02 PROCEDURE — 3074F SYST BP LT 130 MM HG: CPT | Performed by: PHYSICIAN ASSISTANT

## 2020-12-02 PROCEDURE — 3078F DIAST BP <80 MM HG: CPT | Performed by: PHYSICIAN ASSISTANT

## 2020-12-02 RX ORDER — FLUTICASONE PROPIONATE 0.05 %
1 CREAM (GRAM) TOPICAL 2 TIMES DAILY
Qty: 30 G | Refills: 0 | Status: SHIPPED | OUTPATIENT
Start: 2020-12-02

## 2020-12-02 NOTE — PROGRESS NOTES
HPI:    Patient ID: Corrinne Laity is a 28year old female. Patient presents for rash on her right hand for the past days. No fever/chills. No draining. Rash is itchy. Has been there for the past few weeks.  She states it started out as a small d -To call or follow-up in 1 week if no improvement in symptoms.   -Pt was agreeable to plan and will comply with discussion above. No orders of the defined types were placed in this encounter.       Meds This Visit:  Requested Prescriptions     Signed

## 2020-12-11 ENCOUNTER — TELEPHONE (OUTPATIENT)
Dept: FAMILY MEDICINE CLINIC | Facility: CLINIC | Age: 35
End: 2020-12-11

## 2020-12-11 NOTE — TELEPHONE ENCOUNTER
Patient called to give update to [address] her rash has begun to fade on her right hand, especially in center of rash   She also describes fading away of the rash on her fingers although the bumps are still present    Advised message would be sent

## 2020-12-11 NOTE — TELEPHONE ENCOUNTER
Patient given provider's recommendations  Patient verbalized understanding and agreed to plan of care

## 2021-02-12 DIAGNOSIS — G40.909 SEIZURE DISORDER (HCC): ICD-10-CM

## 2021-02-12 RX ORDER — CARBAMAZEPINE 200 MG/1
TABLET ORAL
Qty: 270 TABLET | Refills: 0 | Status: SHIPPED | OUTPATIENT
Start: 2021-02-12 | End: 2021-02-16

## 2021-02-12 NOTE — TELEPHONE ENCOUNTER
Refill request for CARBAMAZEPINE 200 MG Oral Tab, TAKE ONE TABLET BY MOUTH THREE TIMES DAILY , 270 Tablets with 0 Refills    LOV: 12/3/19  NOV: 2/16/21    Last Refilled: 11/21/20

## 2021-02-16 ENCOUNTER — LAB ENCOUNTER (OUTPATIENT)
Dept: LAB | Facility: HOSPITAL | Age: 36
End: 2021-02-16
Attending: Other
Payer: COMMERCIAL

## 2021-02-16 ENCOUNTER — OFFICE VISIT (OUTPATIENT)
Dept: NEUROLOGY | Facility: CLINIC | Age: 36
End: 2021-02-16
Payer: COMMERCIAL

## 2021-02-16 VITALS
BODY MASS INDEX: 18.78 KG/M2 | SYSTOLIC BLOOD PRESSURE: 110 MMHG | WEIGHT: 110 LBS | HEIGHT: 64 IN | DIASTOLIC BLOOD PRESSURE: 68 MMHG

## 2021-02-16 DIAGNOSIS — G40.909 SEIZURE DISORDER (HCC): ICD-10-CM

## 2021-02-16 LAB
ALT SERPL-CCNC: 16 U/L
AST SERPL-CCNC: 15 U/L (ref 15–37)
BASOPHILS # BLD AUTO: 0.06 X10(3) UL (ref 0–0.2)
BASOPHILS NFR BLD AUTO: 1.2 %
DEPRECATED RDW RBC AUTO: 41 FL (ref 35.1–46.3)
EOSINOPHIL # BLD AUTO: 0.03 X10(3) UL (ref 0–0.7)
EOSINOPHIL NFR BLD AUTO: 0.6 %
ERYTHROCYTE [DISTWIDTH] IN BLOOD BY AUTOMATED COUNT: 11.8 % (ref 11–15)
HCT VFR BLD AUTO: 38.2 %
HGB BLD-MCNC: 12.5 G/DL
IMM GRANULOCYTES # BLD AUTO: 0 X10(3) UL (ref 0–1)
IMM GRANULOCYTES NFR BLD: 0 %
LYMPHOCYTES # BLD AUTO: 1.84 X10(3) UL (ref 1–4)
LYMPHOCYTES NFR BLD AUTO: 38.3 %
MCH RBC QN AUTO: 31.1 PG (ref 26–34)
MCHC RBC AUTO-ENTMCNC: 32.7 G/DL (ref 31–37)
MCV RBC AUTO: 95 FL
MONOCYTES # BLD AUTO: 0.35 X10(3) UL (ref 0.1–1)
MONOCYTES NFR BLD AUTO: 7.3 %
NEUTROPHILS # BLD AUTO: 2.53 X10 (3) UL (ref 1.5–7.7)
NEUTROPHILS # BLD AUTO: 2.53 X10(3) UL (ref 1.5–7.7)
NEUTROPHILS NFR BLD AUTO: 52.6 %
PLATELET # BLD AUTO: 239 10(3)UL (ref 150–450)
RBC # BLD AUTO: 4.02 X10(6)UL
WBC # BLD AUTO: 4.8 X10(3) UL (ref 4–11)

## 2021-02-16 PROCEDURE — 85025 COMPLETE CBC W/AUTO DIFF WBC: CPT | Performed by: OTHER

## 2021-02-16 PROCEDURE — 3008F BODY MASS INDEX DOCD: CPT | Performed by: OTHER

## 2021-02-16 PROCEDURE — 99213 OFFICE O/P EST LOW 20 MIN: CPT | Performed by: OTHER

## 2021-02-16 PROCEDURE — 84450 TRANSFERASE (AST) (SGOT): CPT

## 2021-02-16 PROCEDURE — 84460 ALANINE AMINO (ALT) (SGPT): CPT

## 2021-02-16 PROCEDURE — 36415 COLL VENOUS BLD VENIPUNCTURE: CPT | Performed by: OTHER

## 2021-02-16 PROCEDURE — 3078F DIAST BP <80 MM HG: CPT | Performed by: OTHER

## 2021-02-16 PROCEDURE — 3074F SYST BP LT 130 MM HG: CPT | Performed by: OTHER

## 2021-02-16 RX ORDER — CARBAMAZEPINE 200 MG/1
200 TABLET ORAL 3 TIMES DAILY
Qty: 270 TABLET | Refills: 3 | Status: SHIPPED | OUTPATIENT
Start: 2021-02-16

## 2021-02-16 RX ORDER — FOLIC ACID 1 MG/1
1 TABLET ORAL 2 TIMES DAILY
Qty: 180 TABLET | Refills: 3 | Status: SHIPPED | OUTPATIENT
Start: 2021-02-16

## 2021-02-16 NOTE — PROGRESS NOTES
Ms Esau Shaffer. If she may have had a seizure in June, nocturnal seizure. She is taking care of the nephew. No other history of seizures. She relates rare tension headaches. No neck pain, low back pain.   No focal motor, sensory symptoms in the arms or legs

## 2021-03-20 ENCOUNTER — LAB ENCOUNTER (OUTPATIENT)
Dept: LAB | Age: 36
End: 2021-03-20
Attending: FAMILY MEDICINE
Payer: COMMERCIAL

## 2021-03-20 ENCOUNTER — OFFICE VISIT (OUTPATIENT)
Dept: FAMILY MEDICINE CLINIC | Facility: CLINIC | Age: 36
End: 2021-03-20
Payer: COMMERCIAL

## 2021-03-20 VITALS
DIASTOLIC BLOOD PRESSURE: 61 MMHG | SYSTOLIC BLOOD PRESSURE: 98 MMHG | TEMPERATURE: 98 F | HEART RATE: 65 BPM | HEIGHT: 64 IN | BODY MASS INDEX: 19.97 KG/M2 | WEIGHT: 117 LBS

## 2021-03-20 DIAGNOSIS — Z00.00 ANNUAL PHYSICAL EXAM: Primary | ICD-10-CM

## 2021-03-20 DIAGNOSIS — Z00.00 ANNUAL PHYSICAL EXAM: ICD-10-CM

## 2021-03-20 LAB
ALBUMIN SERPL-MCNC: 4.4 G/DL (ref 3.4–5)
ALBUMIN/GLOB SERPL: 1.4 {RATIO} (ref 1–2)
ALP LIVER SERPL-CCNC: 50 U/L
ALT SERPL-CCNC: 16 U/L
ANION GAP SERPL CALC-SCNC: 2 MMOL/L (ref 0–18)
AST SERPL-CCNC: 13 U/L (ref 15–37)
BASOPHILS # BLD AUTO: 0.03 X10(3) UL (ref 0–0.2)
BASOPHILS NFR BLD AUTO: 0.8 %
BILIRUB SERPL-MCNC: 0.2 MG/DL (ref 0.1–2)
BUN BLD-MCNC: 10 MG/DL (ref 7–18)
BUN/CREAT SERPL: 16.9 (ref 10–20)
CALCIUM BLD-MCNC: 8.7 MG/DL (ref 8.5–10.1)
CHLORIDE SERPL-SCNC: 112 MMOL/L (ref 98–112)
CHOLEST SMN-MCNC: 206 MG/DL (ref ?–200)
CO2 SERPL-SCNC: 28 MMOL/L (ref 21–32)
CREAT BLD-MCNC: 0.59 MG/DL
DEPRECATED RDW RBC AUTO: 42 FL (ref 35.1–46.3)
EOSINOPHIL # BLD AUTO: 0.04 X10(3) UL (ref 0–0.7)
EOSINOPHIL NFR BLD AUTO: 1 %
ERYTHROCYTE [DISTWIDTH] IN BLOOD BY AUTOMATED COUNT: 12 % (ref 11–15)
EST. AVERAGE GLUCOSE BLD GHB EST-MCNC: 97 MG/DL (ref 68–126)
GLOBULIN PLAS-MCNC: 3.2 G/DL (ref 2.8–4.4)
GLUCOSE BLD-MCNC: 77 MG/DL (ref 70–99)
HBA1C MFR BLD HPLC: 5 % (ref ?–5.7)
HCT VFR BLD AUTO: 38.7 %
HDLC SERPL-MCNC: 97 MG/DL (ref 40–59)
HGB BLD-MCNC: 12.7 G/DL
IMM GRANULOCYTES # BLD AUTO: 0.01 X10(3) UL (ref 0–1)
IMM GRANULOCYTES NFR BLD: 0.3 %
LDLC SERPL CALC-MCNC: 95 MG/DL (ref ?–100)
LYMPHOCYTES # BLD AUTO: 1.48 X10(3) UL (ref 1–4)
LYMPHOCYTES NFR BLD AUTO: 37 %
M PROTEIN MFR SERPL ELPH: 7.6 G/DL (ref 6.4–8.2)
MCH RBC QN AUTO: 31.2 PG (ref 26–34)
MCHC RBC AUTO-ENTMCNC: 32.8 G/DL (ref 31–37)
MCV RBC AUTO: 95.1 FL
MONOCYTES # BLD AUTO: 0.24 X10(3) UL (ref 0.1–1)
MONOCYTES NFR BLD AUTO: 6 %
NEUTROPHILS # BLD AUTO: 2.2 X10 (3) UL (ref 1.5–7.7)
NEUTROPHILS # BLD AUTO: 2.2 X10(3) UL (ref 1.5–7.7)
NEUTROPHILS NFR BLD AUTO: 54.9 %
NONHDLC SERPL-MCNC: 109 MG/DL (ref ?–130)
OSMOLALITY SERPL CALC.SUM OF ELEC: 292 MOSM/KG (ref 275–295)
PATIENT FASTING Y/N/NP: YES
PATIENT FASTING Y/N/NP: YES
PLATELET # BLD AUTO: 219 10(3)UL (ref 150–450)
POTASSIUM SERPL-SCNC: 4.4 MMOL/L (ref 3.5–5.1)
RBC # BLD AUTO: 4.07 X10(6)UL
SODIUM SERPL-SCNC: 142 MMOL/L (ref 136–145)
TRIGL SERPL-MCNC: 69 MG/DL (ref 30–149)
TSI SER-ACNC: 0.82 MIU/ML (ref 0.36–3.74)
VIT B12 SERPL-MCNC: 213 PG/ML (ref 193–986)
VLDLC SERPL CALC-MCNC: 14 MG/DL (ref 0–30)
WBC # BLD AUTO: 4 X10(3) UL (ref 4–11)

## 2021-03-20 PROCEDURE — 3078F DIAST BP <80 MM HG: CPT | Performed by: FAMILY MEDICINE

## 2021-03-20 PROCEDURE — 84443 ASSAY THYROID STIM HORMONE: CPT

## 2021-03-20 PROCEDURE — 83036 HEMOGLOBIN GLYCOSYLATED A1C: CPT

## 2021-03-20 PROCEDURE — 85025 COMPLETE CBC W/AUTO DIFF WBC: CPT

## 2021-03-20 PROCEDURE — 3008F BODY MASS INDEX DOCD: CPT | Performed by: FAMILY MEDICINE

## 2021-03-20 PROCEDURE — 36415 COLL VENOUS BLD VENIPUNCTURE: CPT

## 2021-03-20 PROCEDURE — 3074F SYST BP LT 130 MM HG: CPT | Performed by: FAMILY MEDICINE

## 2021-03-20 PROCEDURE — 82306 VITAMIN D 25 HYDROXY: CPT

## 2021-03-20 PROCEDURE — 99395 PREV VISIT EST AGE 18-39: CPT | Performed by: FAMILY MEDICINE

## 2021-03-20 PROCEDURE — 80061 LIPID PANEL: CPT

## 2021-03-20 PROCEDURE — 80053 COMPREHEN METABOLIC PANEL: CPT

## 2021-03-20 PROCEDURE — 82607 VITAMIN B-12: CPT

## 2021-03-20 NOTE — PROGRESS NOTES
HPI:   Christopher Arreguin is a 39year old female who presents for a complete physical exam.    Work: homemaker, used to work as dental hygeniest  Social: Lives with 4 kids , hbd,   Diet: eats home cooked meals.   Exercise: Sedentary  GYN history:  Grandmother    • Cancer Paternal Grandmother         unknown type      Social History:   Social History    Tobacco Use      Smoking status: Never Smoker      Smokeless tobacco: Never Used    Vaping Use      Vaping Use: Never used    Alcohol use: No      Al DIFFERENTIAL WITH PLATELET; Future  - COMP METABOLIC PANEL (14); Future  - HEMOGLOBIN A1C; Future  - LIPID PANEL;  Future  - VITAMIN D, 25-HYDROXY; Future  - VITAMIN B12; Future  - TSH W REFLEX TO FREE T4; Future     Desiree Giordano MD  3/20/2021  8:44 AM

## 2021-03-22 LAB — 25(OH)D3 SERPL-MCNC: 5.9 NG/ML (ref 30–100)

## 2021-03-30 ENCOUNTER — TELEPHONE (OUTPATIENT)
Dept: FAMILY MEDICINE CLINIC | Facility: CLINIC | Age: 36
End: 2021-03-30

## 2021-03-30 DIAGNOSIS — E55.9 VITAMIN D DEFICIENCY: Primary | ICD-10-CM

## 2021-03-30 RX ORDER — ERGOCALCIFEROL 1.25 MG/1
50000 CAPSULE ORAL WEEKLY
Qty: 12 CAPSULE | Refills: 0 | Status: SHIPPED | OUTPATIENT
Start: 2021-03-30 | End: 2021-06-21

## 2021-06-19 DIAGNOSIS — E55.9 VITAMIN D DEFICIENCY: ICD-10-CM

## 2021-06-21 RX ORDER — ERGOCALCIFEROL 1.25 MG/1
CAPSULE ORAL
Qty: 12 CAPSULE | Refills: 0 | Status: SHIPPED | OUTPATIENT
Start: 2021-06-21 | End: 2021-09-10

## 2021-09-10 DIAGNOSIS — E55.9 VITAMIN D DEFICIENCY: ICD-10-CM

## 2021-09-10 RX ORDER — ERGOCALCIFEROL 1.25 MG/1
50000 CAPSULE ORAL WEEKLY
Qty: 12 CAPSULE | Refills: 1 | Status: SHIPPED | OUTPATIENT
Start: 2021-09-10

## 2021-09-10 NOTE — TELEPHONE ENCOUNTER
Please review refill protocol failed/ no protocol  Requested Prescriptions   Pending Prescriptions Disp Refills    ERGOCALCIFEROL 1.25 MG (39996 UT) Oral Cap [Pharmacy Med Name: Vitamin D 50,000 Unit Cap Stri] 12 capsule 0     Sig: Take 1 capsule (50,000 U

## 2021-11-01 ENCOUNTER — OFFICE VISIT (OUTPATIENT)
Dept: FAMILY MEDICINE CLINIC | Facility: CLINIC | Age: 36
End: 2021-11-01
Payer: COMMERCIAL

## 2021-11-01 VITALS
HEART RATE: 64 BPM | HEIGHT: 64 IN | BODY MASS INDEX: 20.32 KG/M2 | WEIGHT: 119 LBS | TEMPERATURE: 97 F | SYSTOLIC BLOOD PRESSURE: 110 MMHG | DIASTOLIC BLOOD PRESSURE: 66 MMHG

## 2021-11-01 DIAGNOSIS — S09.90XA INJURY OF HEAD, INITIAL ENCOUNTER: ICD-10-CM

## 2021-11-01 DIAGNOSIS — G44.209 ACUTE NON INTRACTABLE TENSION-TYPE HEADACHE: ICD-10-CM
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
## 2021-11-01 DIAGNOSIS — R42 DIZZINESS: ICD-10-CM

## 2021-11-01 PROCEDURE — 3008F BODY MASS INDEX DOCD: CPT | Performed by: FAMILY MEDICINE

## 2021-11-01 PROCEDURE — 99214 OFFICE O/P EST MOD 30 MIN: CPT | Performed by: FAMILY MEDICINE

## 2021-11-01 PROCEDURE — 3074F SYST BP LT 130 MM HG: CPT | Performed by: FAMILY MEDICINE

## 2021-11-01 PROCEDURE — 3078F DIAST BP <80 MM HG: CPT | Performed by: FAMILY MEDICINE

## 2021-11-01 RX ORDER — NAPROXEN 500 MG/1
500 TABLET ORAL 2 TIMES DAILY WITH MEALS
Qty: 45 TABLET | Refills: 0 | Status: SHIPPED | OUTPATIENT
Start: 2021-11-01

## 2021-11-01 RX ORDER — MECLIZINE HYDROCHLORIDE 25 MG/1
25 TABLET ORAL 3 TIMES DAILY PRN
Qty: 30 TABLET | Refills: 3 | Status: SHIPPED | OUTPATIENT
Start: 2021-11-01
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
## 2021-11-06 ENCOUNTER — HOSPITAL ENCOUNTER (OUTPATIENT)
Dept: CT IMAGING | Facility: HOSPITAL | Age: 36
Discharge: HOME | End: 2021-11-06
Attending: FAMILY MEDICINE
Payer: COMMERCIAL
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
## 2021-11-06 DIAGNOSIS — G44.209 ACUTE NON INTRACTABLE TENSION-TYPE HEADACHE: ICD-10-CM

## 2021-11-06 DIAGNOSIS — R42 DIZZINESS: ICD-10-CM

## 2021-11-06 DIAGNOSIS — S09.90XA INJURY OF HEAD, INITIAL ENCOUNTER: ICD-10-CM

## 2021-11-06 PROCEDURE — 70450 CT HEAD/BRAIN W/O DYE: CPT | Performed by: FAMILY MEDICINE

## 2022-02-15 RX ORDER — ERGOCALCIFEROL 1.25 MG/1
50000 CAPSULE ORAL WEEKLY
Qty: 12 CAPSULE | Refills: 0 | Status: SHIPPED | OUTPATIENT
Start: 2022-02-15

## 2022-02-15 NOTE — TELEPHONE ENCOUNTER
Please review refill protocol failed/ no protocol  Requested Prescriptions   Pending Prescriptions Disp Refills    ERGOCALCIFEROL 1.25 MG (35390 UT) Oral Cap [Pharmacy Med Name: Vitamin D 50,000 Unit Cap Stri] 12 capsule 0     Sig: TAKE ONE CAPSULE BY MOUTH WEEKLY        There is no refill protocol information for this order

## 2022-02-22 ENCOUNTER — OFFICE VISIT (OUTPATIENT)
Dept: NEUROLOGY | Facility: CLINIC | Age: 37
End: 2022-02-22
Payer: COMMERCIAL

## 2022-02-22 ENCOUNTER — LAB ENCOUNTER (OUTPATIENT)
Dept: LAB | Facility: HOSPITAL | Age: 37
End: 2022-02-22
Attending: Other
Payer: COMMERCIAL

## 2022-02-22 VITALS
HEART RATE: 61 BPM | SYSTOLIC BLOOD PRESSURE: 110 MMHG | HEIGHT: 64 IN | BODY MASS INDEX: 20.32 KG/M2 | DIASTOLIC BLOOD PRESSURE: 62 MMHG | OXYGEN SATURATION: 99 % | WEIGHT: 119 LBS

## 2022-02-22 DIAGNOSIS — G40.909 SEIZURE DISORDER (HCC): ICD-10-CM

## 2022-02-22 LAB
ALT SERPL-CCNC: 16 U/L
AST SERPL-CCNC: 11 U/L (ref 15–37)
BASOPHILS # BLD AUTO: 0.03 X10(3) UL (ref 0–0.2)
BASOPHILS NFR BLD AUTO: 0.6 %
EOSINOPHIL # BLD AUTO: 0.03 X10(3) UL (ref 0–0.7)
EOSINOPHIL NFR BLD AUTO: 0.6 %
ERYTHROCYTE [DISTWIDTH] IN BLOOD BY AUTOMATED COUNT: 12.1 % (ref 11–15)
HCT VFR BLD AUTO: 37.1 %
HGB BLD-MCNC: 12.3 G/DL
IMM GRANULOCYTES # BLD AUTO: 0 X10(3) UL (ref 0–1)
IMM GRANULOCYTES NFR BLD: 0 %
LYMPHOCYTES # BLD AUTO: 1.57 X10(3) UL (ref 1–4)
MCH RBC QN AUTO: 31.7 PG (ref 26–34)
MCHC RBC AUTO-ENTMCNC: 33.2 G/DL (ref 31–37)
MCV RBC AUTO: 95.6 FL
MONOCYTES # BLD AUTO: 0.25 X10(3) UL (ref 0.1–1)
MONOCYTES NFR BLD AUTO: 5.3 %
NEUTROPHILS # BLD AUTO: 2.87 X10 (3) UL (ref 1.5–7.7)
NEUTROPHILS # BLD AUTO: 2.87 X10(3) UL (ref 1.5–7.7)
NEUTROPHILS NFR BLD AUTO: 60.4 %
PLATELET # BLD AUTO: 209 10(3)UL (ref 150–450)
RBC # BLD AUTO: 3.88 X10(6)UL
WBC # BLD AUTO: 4.8 X10(3) UL (ref 4–11)

## 2022-02-22 PROCEDURE — 99213 OFFICE O/P EST LOW 20 MIN: CPT | Performed by: OTHER

## 2022-02-22 PROCEDURE — 85025 COMPLETE CBC W/AUTO DIFF WBC: CPT | Performed by: OTHER

## 2022-02-22 PROCEDURE — 84450 TRANSFERASE (AST) (SGOT): CPT

## 2022-02-22 PROCEDURE — 3008F BODY MASS INDEX DOCD: CPT | Performed by: OTHER

## 2022-02-22 PROCEDURE — 36415 COLL VENOUS BLD VENIPUNCTURE: CPT | Performed by: OTHER

## 2022-02-22 PROCEDURE — 3078F DIAST BP <80 MM HG: CPT | Performed by: OTHER

## 2022-02-22 PROCEDURE — 84460 ALANINE AMINO (ALT) (SGPT): CPT

## 2022-02-22 PROCEDURE — 3074F SYST BP LT 130 MM HG: CPT | Performed by: OTHER

## 2022-02-22 RX ORDER — FOLIC ACID 1 MG/1
1 TABLET ORAL 2 TIMES DAILY
Qty: 180 TABLET | Refills: 3 | Status: SHIPPED | OUTPATIENT
Start: 2022-02-22

## 2022-02-22 RX ORDER — CARBAMAZEPINE 200 MG/1
200 TABLET ORAL 3 TIMES DAILY
Qty: 270 TABLET | Refills: 3 | Status: SHIPPED | OUTPATIENT
Start: 2022-02-22

## 2022-03-21 ENCOUNTER — OFFICE VISIT (OUTPATIENT)
Dept: FAMILY MEDICINE CLINIC | Facility: CLINIC | Age: 37
End: 2022-03-21
Payer: COMMERCIAL

## 2022-03-21 ENCOUNTER — LAB ENCOUNTER (OUTPATIENT)
Dept: LAB | Age: 37
End: 2022-03-21
Attending: FAMILY MEDICINE
Payer: COMMERCIAL

## 2022-03-21 VITALS
WEIGHT: 117 LBS | HEIGHT: 64 IN | SYSTOLIC BLOOD PRESSURE: 112 MMHG | HEART RATE: 61 BPM | DIASTOLIC BLOOD PRESSURE: 73 MMHG | BODY MASS INDEX: 19.97 KG/M2

## 2022-03-21 DIAGNOSIS — E55.9 VITAMIN D DEFICIENCY: ICD-10-CM

## 2022-03-21 DIAGNOSIS — Z00.00 ENCOUNTER FOR ANNUAL HEALTH EXAMINATION: Primary | ICD-10-CM

## 2022-03-21 DIAGNOSIS — Z00.00 ENCOUNTER FOR ANNUAL HEALTH EXAMINATION: ICD-10-CM

## 2022-03-21 LAB
ANION GAP SERPL CALC-SCNC: 7 MMOL/L (ref 0–18)
BUN BLD-MCNC: 8 MG/DL (ref 7–18)
BUN/CREAT SERPL: 13.8 (ref 10–20)
CALCIUM BLD-MCNC: 8.9 MG/DL (ref 8.5–10.1)
CHLORIDE SERPL-SCNC: 107 MMOL/L (ref 98–112)
CHOLEST SERPL-MCNC: 209 MG/DL (ref ?–200)
CO2 SERPL-SCNC: 26 MMOL/L (ref 21–32)
CREAT BLD-MCNC: 0.58 MG/DL
EST. AVERAGE GLUCOSE BLD GHB EST-MCNC: 100 MG/DL (ref 68–126)
FASTING PATIENT LIPID ANSWER: YES
FASTING STATUS PATIENT QL REPORTED: YES
GLUCOSE BLD-MCNC: 94 MG/DL (ref 70–99)
HBA1C MFR BLD: 5.1 % (ref ?–5.7)
HDLC SERPL-MCNC: 95 MG/DL (ref 40–59)
LDLC SERPL CALC-MCNC: 102 MG/DL (ref ?–100)
NONHDLC SERPL-MCNC: 114 MG/DL (ref ?–130)
OSMOLALITY SERPL CALC.SUM OF ELEC: 288 MOSM/KG (ref 275–295)
POTASSIUM SERPL-SCNC: 3.9 MMOL/L (ref 3.5–5.1)
SODIUM SERPL-SCNC: 140 MMOL/L (ref 136–145)
TRIGL SERPL-MCNC: 66 MG/DL (ref 30–149)
TSI SER-ACNC: 0.69 MIU/ML (ref 0.36–3.74)
VLDLC SERPL CALC-MCNC: 11 MG/DL (ref 0–30)

## 2022-03-21 PROCEDURE — 3008F BODY MASS INDEX DOCD: CPT | Performed by: FAMILY MEDICINE

## 2022-03-21 PROCEDURE — 36415 COLL VENOUS BLD VENIPUNCTURE: CPT

## 2022-03-21 PROCEDURE — 3078F DIAST BP <80 MM HG: CPT | Performed by: FAMILY MEDICINE

## 2022-03-21 PROCEDURE — 83036 HEMOGLOBIN GLYCOSYLATED A1C: CPT

## 2022-03-21 PROCEDURE — 84443 ASSAY THYROID STIM HORMONE: CPT

## 2022-03-21 PROCEDURE — 80048 BASIC METABOLIC PNL TOTAL CA: CPT

## 2022-03-21 PROCEDURE — 99395 PREV VISIT EST AGE 18-39: CPT | Performed by: FAMILY MEDICINE

## 2022-03-21 PROCEDURE — 3074F SYST BP LT 130 MM HG: CPT | Performed by: FAMILY MEDICINE

## 2022-03-21 PROCEDURE — 80061 LIPID PANEL: CPT

## 2022-03-25 ENCOUNTER — TELEPHONE (OUTPATIENT)
Dept: NEUROLOGY | Facility: CLINIC | Age: 37
End: 2022-03-25

## 2022-03-25 ENCOUNTER — HOSPITAL ENCOUNTER (OUTPATIENT)
Dept: BONE DENSITY | Facility: HOSPITAL | Age: 37
Discharge: HOME | End: 2022-03-25
Attending: Other
Payer: COMMERCIAL

## 2022-03-25 DIAGNOSIS — G40.909 SEIZURE DISORDER (HCC): ICD-10-CM

## 2022-03-25 PROCEDURE — 77080 DXA BONE DENSITY AXIAL: CPT | Performed by: OTHER

## 2022-03-26 NOTE — TELEPHONE ENCOUNTER
Please call patient tell her DEXA scan revealed some osteopenia. Please ask her to follow-up with her primary care doctor for appropriate management treatment.

## 2022-04-04 PROBLEM — E78.00 HYPERCHOLESTEREMIA: Status: ACTIVE | Noted: 2022-04-04

## 2022-04-05 ENCOUNTER — OFFICE VISIT (OUTPATIENT)
Dept: FAMILY MEDICINE CLINIC | Facility: CLINIC | Age: 37
End: 2022-04-05
Payer: COMMERCIAL

## 2022-04-05 VITALS
DIASTOLIC BLOOD PRESSURE: 63 MMHG | TEMPERATURE: 98 F | BODY MASS INDEX: 19.81 KG/M2 | SYSTOLIC BLOOD PRESSURE: 102 MMHG | WEIGHT: 116 LBS | HEIGHT: 64 IN | HEART RATE: 69 BPM

## 2022-04-05 DIAGNOSIS — M85.88 OSTEOPENIA OF LUMBAR SPINE: ICD-10-CM

## 2022-04-05 DIAGNOSIS — E78.00 HYPERCHOLESTEREMIA: Primary | ICD-10-CM

## 2022-04-05 PROCEDURE — 3008F BODY MASS INDEX DOCD: CPT | Performed by: FAMILY MEDICINE

## 2022-04-05 PROCEDURE — 3078F DIAST BP <80 MM HG: CPT | Performed by: FAMILY MEDICINE

## 2022-04-05 PROCEDURE — 3074F SYST BP LT 130 MM HG: CPT | Performed by: FAMILY MEDICINE

## 2022-04-05 PROCEDURE — 99214 OFFICE O/P EST MOD 30 MIN: CPT | Performed by: FAMILY MEDICINE

## 2022-04-19 ENCOUNTER — TELEPHONE (OUTPATIENT)
Dept: FAMILY MEDICINE CLINIC | Facility: CLINIC | Age: 37
End: 2022-04-19

## 2022-04-19 NOTE — TELEPHONE ENCOUNTER
Patient states she is having issues finding Calcium 1000 - 1200 mg and Vitamin D3 600-800 UI. Patient would like to know if she can take Vitamin D with K2? Patient was seen on 4/5 with Dr. Josi Raman. Please advise.     Instructions    TOME CALCIO ENTRE 1,000 and 1,200 milligrams   Y TOME VITAMINA D3 ENTRE 600 to 800 international units (IU) [place]

## 2022-04-20 RX ORDER — PSEUDOEPHEDRINE HCL 30 MG
2 TABLET ORAL DAILY
Qty: 180 TABLET | Refills: 1 | Status: SHIPPED | OUTPATIENT
Start: 2022-04-20 | End: 2022-07-19

## 2022-04-21 NOTE — TELEPHONE ENCOUNTER
Called patient and discussed vitamin D and calcium supplementation. Informed her that I would like her to see endocrinology to discuss possible Fosamax/Prolia. Given that patient is on carbamazepine. 1. Osteopenia, unspecified location  - ENDOCRINOLOGY - INTERNAL  - Calcium Carbonate-Vitamin D (CALCIUM 600/VITAMIN D) 600-400 MG-UNIT Oral Tab; Take 2 tablets by mouth daily. Dispense: 180 tablet;  Refill: 1
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
## 2022-05-06 RX ORDER — ERGOCALCIFEROL 1.25 MG/1
50000 CAPSULE ORAL WEEKLY
Qty: 12 CAPSULE | Refills: 0 | Status: SHIPPED | OUTPATIENT
Start: 2022-05-06

## 2022-06-01 ENCOUNTER — OFFICE VISIT (OUTPATIENT)
Dept: FAMILY MEDICINE CLINIC | Facility: CLINIC | Age: 37
End: 2022-06-01
Payer: COMMERCIAL

## 2022-06-01 VITALS
SYSTOLIC BLOOD PRESSURE: 98 MMHG | HEIGHT: 64 IN | DIASTOLIC BLOOD PRESSURE: 63 MMHG | WEIGHT: 119 LBS | BODY MASS INDEX: 20.32 KG/M2 | HEART RATE: 62 BPM

## 2022-06-01 DIAGNOSIS — Z00.00 ANNUAL PHYSICAL EXAM: Primary | ICD-10-CM

## 2022-06-01 PROCEDURE — 99395 PREV VISIT EST AGE 18-39: CPT | Performed by: FAMILY MEDICINE

## 2022-06-01 PROCEDURE — 3008F BODY MASS INDEX DOCD: CPT | Performed by: FAMILY MEDICINE

## 2022-06-01 PROCEDURE — 3074F SYST BP LT 130 MM HG: CPT | Performed by: FAMILY MEDICINE

## 2022-06-01 PROCEDURE — 3078F DIAST BP <80 MM HG: CPT | Performed by: FAMILY MEDICINE

## 2022-06-02 LAB — HPV I/H RISK 1 DNA SPEC QL NAA+PROBE: NEGATIVE

## 2022-06-07 LAB — LAST PAP RESULT: NORMAL

## 2022-06-27 ENCOUNTER — HOSPITAL ENCOUNTER (OUTPATIENT)
Age: 37
Discharge: HOME | End: 2022-06-27
Payer: COMMERCIAL

## 2022-06-27 VITALS
OXYGEN SATURATION: 100 % | BODY MASS INDEX: 19.63 KG/M2 | HEIGHT: 64 IN | HEART RATE: 64 BPM | RESPIRATION RATE: 16 BRPM | TEMPERATURE: 97 F | DIASTOLIC BLOOD PRESSURE: 61 MMHG | WEIGHT: 115 LBS | SYSTOLIC BLOOD PRESSURE: 116 MMHG

## 2022-06-27 DIAGNOSIS — H65.191 ACUTE MEE (MIDDLE EAR EFFUSION), RIGHT: Primary | ICD-10-CM

## 2022-06-27 PROCEDURE — 99213 OFFICE O/P EST LOW 20 MIN: CPT | Performed by: EMERGENCY MEDICINE

## 2022-06-27 RX ORDER — LEVOCETIRIZINE DIHYDROCHLORIDE 5 MG/1
5 TABLET, FILM COATED ORAL EVERY EVENING
Qty: 30 TABLET | Refills: 0 | Status: SHIPPED | OUTPATIENT
Start: 2022-06-27

## 2022-10-31 ENCOUNTER — OFFICE VISIT (OUTPATIENT)
Dept: ENDOCRINOLOGY CLINIC | Facility: CLINIC | Age: 37
End: 2022-10-31
Payer: COMMERCIAL

## 2022-10-31 ENCOUNTER — LAB ENCOUNTER (OUTPATIENT)
Dept: LAB | Facility: HOSPITAL | Age: 37
End: 2022-10-31
Attending: INTERNAL MEDICINE
Payer: COMMERCIAL

## 2022-10-31 VITALS
DIASTOLIC BLOOD PRESSURE: 66 MMHG | SYSTOLIC BLOOD PRESSURE: 107 MMHG | HEART RATE: 77 BPM | BODY MASS INDEX: 20 KG/M2 | WEIGHT: 119 LBS

## 2022-10-31 DIAGNOSIS — E55.9 VITAMIN D DEFICIENCY: ICD-10-CM

## 2022-10-31 DIAGNOSIS — M85.88 OSTEOPENIA OF LUMBAR SPINE: ICD-10-CM

## 2022-10-31 DIAGNOSIS — M85.88 OSTEOPENIA OF LUMBAR SPINE: Primary | ICD-10-CM

## 2022-10-31 LAB
ANION GAP SERPL CALC-SCNC: 6 MMOL/L (ref 0–18)
BUN BLD-MCNC: 9 MG/DL (ref 7–18)
BUN/CREAT SERPL: 16.7 (ref 10–20)
CALCIUM BLD-MCNC: 9.1 MG/DL (ref 8.5–10.1)
CHLORIDE SERPL-SCNC: 107 MMOL/L (ref 98–112)
CO2 SERPL-SCNC: 27 MMOL/L (ref 21–32)
CREAT BLD-MCNC: 0.54 MG/DL
FASTING STATUS PATIENT QL REPORTED: YES
GFR SERPLBLD BASED ON 1.73 SQ M-ARVRAT: 122 ML/MIN/1.73M2 (ref 60–?)
GLUCOSE BLD-MCNC: 83 MG/DL (ref 70–99)
OSMOLALITY SERPL CALC.SUM OF ELEC: 288 MOSM/KG (ref 275–295)
POTASSIUM SERPL-SCNC: 4.1 MMOL/L (ref 3.5–5.1)
PTH-INTACT SERPL-MCNC: 56.6 PG/ML (ref 18.5–88)
SODIUM SERPL-SCNC: 140 MMOL/L (ref 136–145)
VIT D+METAB SERPL-MCNC: 14.7 NG/ML (ref 30–100)

## 2022-10-31 PROCEDURE — 3074F SYST BP LT 130 MM HG: CPT | Performed by: INTERNAL MEDICINE

## 2022-10-31 PROCEDURE — 80048 BASIC METABOLIC PNL TOTAL CA: CPT

## 2022-10-31 PROCEDURE — 36415 COLL VENOUS BLD VENIPUNCTURE: CPT

## 2022-10-31 PROCEDURE — 83970 ASSAY OF PARATHORMONE: CPT

## 2022-10-31 PROCEDURE — 82306 VITAMIN D 25 HYDROXY: CPT

## 2022-10-31 PROCEDURE — 99243 OFF/OP CNSLTJ NEW/EST LOW 30: CPT | Performed by: INTERNAL MEDICINE

## 2022-10-31 PROCEDURE — 3078F DIAST BP <80 MM HG: CPT | Performed by: INTERNAL MEDICINE

## 2022-11-02 ENCOUNTER — TELEPHONE (OUTPATIENT)
Dept: ENDOCRINOLOGY CLINIC | Facility: CLINIC | Age: 37
End: 2022-11-02

## 2022-11-02 ENCOUNTER — PATIENT MESSAGE (OUTPATIENT)
Dept: ENDOCRINOLOGY CLINIC | Facility: CLINIC | Age: 37
End: 2022-11-02

## 2022-11-02 DIAGNOSIS — E55.9 VITAMIN D DEFICIENCY: Primary | ICD-10-CM

## 2022-11-02 RX ORDER — ERGOCALCIFEROL 1.25 MG/1
50000 CAPSULE ORAL WEEKLY
Qty: 12 CAPSULE | Refills: 1 | Status: SHIPPED | OUTPATIENT
Start: 2022-11-02

## 2022-11-02 NOTE — TELEPHONE ENCOUNTER
Please call patient - she still has significant vitamin D deficiency which is likely contributing to bone loss. Start Ergocalciferol 50,000 units weekly #12, refill 1 and recheck Vitamin D in 3 months. Thanks.

## 2022-11-02 NOTE — TELEPHONE ENCOUNTER
From: Margaret Mckeon  To: Joana Elizalde MD  Sent: 11/2/2022 8:28 AM CDT  Subject: Vitamin D prescription     Good morning! Am I getting a prescription for vitamin D? So I can call my pharmacy to make sure the prescription is ready. Have a great day!

## 2022-11-02 NOTE — TELEPHONE ENCOUNTER
Spoke to patient to relay message below - patient stated understanding and will take ergocalciferol 50,000 units weekly for next 12 weeks and then repeat vitamin D lab   Lab ordered  RX sent

## 2022-11-05 ENCOUNTER — OFFICE VISIT (OUTPATIENT)
Dept: DERMATOLOGY CLINIC | Facility: CLINIC | Age: 37
End: 2022-11-05
Payer: COMMERCIAL

## 2022-11-05 DIAGNOSIS — L30.9 DERMATITIS: Primary | ICD-10-CM

## 2022-11-05 PROCEDURE — 99213 OFFICE O/P EST LOW 20 MIN: CPT | Performed by: PHYSICIAN ASSISTANT

## 2022-11-05 RX ORDER — MOMETASONE FUROATE 1 MG/G
1 OINTMENT TOPICAL DAILY
Qty: 30 G | Refills: 1 | Status: SHIPPED | OUTPATIENT
Start: 2022-11-05

## 2023-01-23 ENCOUNTER — APPOINTMENT (OUTPATIENT)
Dept: GENERAL RADIOLOGY | Facility: HOSPITAL | Age: 38
End: 2023-01-23
Attending: EMERGENCY MEDICINE
Payer: COMMERCIAL

## 2023-01-23 ENCOUNTER — NURSE TRIAGE (OUTPATIENT)
Dept: FAMILY MEDICINE CLINIC | Facility: CLINIC | Age: 38
End: 2023-01-23

## 2023-01-23 ENCOUNTER — OFFICE VISIT (OUTPATIENT)
Dept: FAMILY MEDICINE CLINIC | Facility: CLINIC | Age: 38
End: 2023-01-23

## 2023-01-23 ENCOUNTER — HOSPITAL ENCOUNTER (EMERGENCY)
Facility: HOSPITAL | Age: 38
Discharge: HOME | End: 2023-01-23
Attending: EMERGENCY MEDICINE
Payer: COMMERCIAL

## 2023-01-23 VITALS
WEIGHT: 117 LBS | DIASTOLIC BLOOD PRESSURE: 57 MMHG | HEART RATE: 93 BPM | TEMPERATURE: 98 F | BODY MASS INDEX: 19.49 KG/M2 | RESPIRATION RATE: 18 BRPM | SYSTOLIC BLOOD PRESSURE: 96 MMHG | HEIGHT: 65 IN

## 2023-01-23 VITALS
HEIGHT: 64 IN | SYSTOLIC BLOOD PRESSURE: 100 MMHG | HEART RATE: 70 BPM | RESPIRATION RATE: 18 BRPM | DIASTOLIC BLOOD PRESSURE: 49 MMHG | BODY MASS INDEX: 21.34 KG/M2 | WEIGHT: 125 LBS | OXYGEN SATURATION: 100 % | TEMPERATURE: 99 F

## 2023-01-23 DIAGNOSIS — S00.83XA CONTUSION OF FACE, INITIAL ENCOUNTER: ICD-10-CM

## 2023-01-23 DIAGNOSIS — R56.9 SEIZURE (HCC): Primary | ICD-10-CM

## 2023-01-23 DIAGNOSIS — G40.919 BREAKTHROUGH SEIZURE (HCC): Primary | ICD-10-CM

## 2023-01-23 LAB
ANION GAP SERPL CALC-SCNC: 9 MMOL/L (ref 0–18)
B-HCG UR QL: NEGATIVE
BASOPHILS # BLD AUTO: 0.03 X10(3) UL (ref 0–0.2)
BASOPHILS NFR BLD AUTO: 0.3 %
BUN BLD-MCNC: 7 MG/DL (ref 7–18)
BUN/CREAT SERPL: 10.8 (ref 10–20)
CALCIUM BLD-MCNC: 9.2 MG/DL (ref 8.5–10.1)
CARBAMAZEPINE SERPL-MCNC: 4.5 UG/ML (ref 4–12)
CHLORIDE SERPL-SCNC: 108 MMOL/L (ref 98–112)
CO2 SERPL-SCNC: 25 MMOL/L (ref 21–32)
CREAT BLD-MCNC: 0.65 MG/DL
DEPRECATED RDW RBC AUTO: 40.7 FL (ref 35.1–46.3)
EOSINOPHIL # BLD AUTO: 0 X10(3) UL (ref 0–0.7)
EOSINOPHIL NFR BLD AUTO: 0 %
ERYTHROCYTE [DISTWIDTH] IN BLOOD BY AUTOMATED COUNT: 12 % (ref 11–15)
GFR SERPLBLD BASED ON 1.73 SQ M-ARVRAT: 116 ML/MIN/1.73M2 (ref 60–?)
GLUCOSE BLD-MCNC: 90 MG/DL (ref 70–99)
HCT VFR BLD AUTO: 37.3 %
HGB BLD-MCNC: 12.6 G/DL
IMM GRANULOCYTES # BLD AUTO: 0.02 X10(3) UL (ref 0–1)
IMM GRANULOCYTES NFR BLD: 0.2 %
LYMPHOCYTES # BLD AUTO: 0.88 X10(3) UL (ref 1–4)
LYMPHOCYTES NFR BLD AUTO: 9 %
MCH RBC QN AUTO: 31 PG (ref 26–34)
MCHC RBC AUTO-ENTMCNC: 33.8 G/DL (ref 31–37)
MCV RBC AUTO: 91.9 FL
MONOCYTES # BLD AUTO: 0.34 X10(3) UL (ref 0.1–1)
MONOCYTES NFR BLD AUTO: 3.5 %
NEUTROPHILS # BLD AUTO: 8.56 X10 (3) UL (ref 1.5–7.7)
NEUTROPHILS # BLD AUTO: 8.56 X10(3) UL (ref 1.5–7.7)
NEUTROPHILS NFR BLD AUTO: 87 %
OSMOLALITY SERPL CALC.SUM OF ELEC: 292 MOSM/KG (ref 275–295)
PLATELET # BLD AUTO: 238 10(3)UL (ref 150–450)
POTASSIUM SERPL-SCNC: 3.4 MMOL/L (ref 3.5–5.1)
RBC # BLD AUTO: 4.06 X10(6)UL
SODIUM SERPL-SCNC: 142 MMOL/L (ref 136–145)
WBC # BLD AUTO: 9.8 X10(3) UL (ref 4–11)

## 2023-01-23 PROCEDURE — 81025 URINE PREGNANCY TEST: CPT

## 2023-01-23 PROCEDURE — 3078F DIAST BP <80 MM HG: CPT | Performed by: FAMILY MEDICINE

## 2023-01-23 PROCEDURE — 80156 ASSAY CARBAMAZEPINE TOTAL: CPT | Performed by: EMERGENCY MEDICINE

## 2023-01-23 PROCEDURE — 99284 EMERGENCY DEPT VISIT MOD MDM: CPT

## 2023-01-23 PROCEDURE — 3074F SYST BP LT 130 MM HG: CPT | Performed by: FAMILY MEDICINE

## 2023-01-23 PROCEDURE — 80048 BASIC METABOLIC PNL TOTAL CA: CPT | Performed by: EMERGENCY MEDICINE

## 2023-01-23 PROCEDURE — 36415 COLL VENOUS BLD VENIPUNCTURE: CPT

## 2023-01-23 PROCEDURE — 85025 COMPLETE CBC W/AUTO DIFF WBC: CPT | Performed by: EMERGENCY MEDICINE

## 2023-01-23 PROCEDURE — 3008F BODY MASS INDEX DOCD: CPT | Performed by: FAMILY MEDICINE

## 2023-01-23 PROCEDURE — 70150 X-RAY EXAM OF FACIAL BONES: CPT | Performed by: EMERGENCY MEDICINE

## 2023-01-23 PROCEDURE — 99213 OFFICE O/P EST LOW 20 MIN: CPT | Performed by: FAMILY MEDICINE

## 2023-01-23 RX ORDER — CARBAMAZEPINE 200 MG/1
200 TABLET ORAL ONCE
Status: COMPLETED | OUTPATIENT
Start: 2023-01-23 | End: 2023-01-23

## 2023-01-23 NOTE — ED INITIAL ASSESSMENT (HPI)
Pt presents ambulatorty to the ER. Pt reports having an unwitnessed seizure this morning lasting approximately 10 mins. Stated prior to seizure she was sitting on couch. Unaware if hit her head. Pt reports she is going through a lot of stress lately and had forgotten to take her prescribed medications a few times.  Pt takes Carbamazepime for her epilepsy

## 2023-02-22 ENCOUNTER — OFFICE VISIT (OUTPATIENT)
Dept: NEUROLOGY | Facility: CLINIC | Age: 38
End: 2023-02-22
Payer: COMMERCIAL

## 2023-02-22 VITALS — BODY MASS INDEX: 21.34 KG/M2 | HEIGHT: 64 IN | WEIGHT: 125 LBS

## 2023-02-22 DIAGNOSIS — G40.909 SEIZURE DISORDER (HCC): Primary | ICD-10-CM

## 2023-02-22 PROCEDURE — 99215 OFFICE O/P EST HI 40 MIN: CPT | Performed by: OTHER

## 2023-02-22 PROCEDURE — 3008F BODY MASS INDEX DOCD: CPT | Performed by: OTHER

## 2023-02-22 RX ORDER — LACOSAMIDE 50 MG/1
TABLET ORAL
Qty: 346 TABLET | Refills: 0 | Status: SHIPPED | OUTPATIENT
Start: 2023-02-22 | End: 2023-05-23

## 2023-03-01 ENCOUNTER — LAB ENCOUNTER (OUTPATIENT)
Dept: LAB | Facility: HOSPITAL | Age: 38
End: 2023-03-01
Attending: INTERNAL MEDICINE
Payer: COMMERCIAL

## 2023-03-01 DIAGNOSIS — G40.909 SEIZURE DISORDER (HCC): ICD-10-CM

## 2023-03-01 DIAGNOSIS — E55.9 VITAMIN D DEFICIENCY: ICD-10-CM

## 2023-03-01 LAB
ATRIAL RATE: 65 BPM
P AXIS: 69 DEGREES
P-R INTERVAL: 166 MS
Q-T INTERVAL: 380 MS
QRS DURATION: 76 MS
QTC CALCULATION (BEZET): 395 MS
R AXIS: 52 DEGREES
T AXIS: 62 DEGREES
VENTRICULAR RATE: 65 BPM
VIT D+METAB SERPL-MCNC: 76.6 NG/ML (ref 30–100)

## 2023-03-01 PROCEDURE — 93005 ELECTROCARDIOGRAM TRACING: CPT

## 2023-03-01 PROCEDURE — 36415 COLL VENOUS BLD VENIPUNCTURE: CPT

## 2023-03-01 PROCEDURE — 93010 ELECTROCARDIOGRAM REPORT: CPT | Performed by: INTERNAL MEDICINE

## 2023-03-01 PROCEDURE — 82306 VITAMIN D 25 HYDROXY: CPT

## 2023-03-01 RX ORDER — FOLIC ACID 1 MG/1
1 TABLET ORAL 2 TIMES DAILY
Qty: 180 TABLET | Refills: 0 | Status: SHIPPED | OUTPATIENT
Start: 2023-03-01

## 2023-03-01 NOTE — TELEPHONE ENCOUNTER
Medication: Folic acid 1mg, 1 tab po twice daily    LOV: 2/22/23  NOV: 4/5/23    Last refill/[place]: 2/22/22 (#180/3RF)

## 2023-03-29 ENCOUNTER — HOSPITAL ENCOUNTER (OUTPATIENT)
Dept: ELECTROPHYSIOLOGY | Facility: HOSPITAL | Age: 38
Discharge: HOME | End: 2023-03-29
Attending: Other
Payer: COMMERCIAL

## 2023-03-29 PROCEDURE — 95816 EEG AWAKE AND DROWSY: CPT | Performed by: OTHER

## 2023-04-14 DIAGNOSIS — E55.9 VITAMIN D DEFICIENCY: ICD-10-CM

## 2023-04-14 RX ORDER — ERGOCALCIFEROL 1.25 MG/1
CAPSULE ORAL
Qty: 12 CAPSULE | Refills: 0 | Status: SHIPPED | OUTPATIENT
Start: 2023-04-14

## 2023-04-14 NOTE — TELEPHONE ENCOUNTER
LOV; 10/31/22    RTC;6months    F/U;No FU/LMTCB     Pending Monthly Supply; order pending, approve if appropriate.

## 2023-04-26 ENCOUNTER — OFFICE VISIT (OUTPATIENT)
Dept: NEUROLOGY | Facility: CLINIC | Age: 38
End: 2023-04-26
Payer: COMMERCIAL

## 2023-04-26 VITALS
WEIGHT: 125 LBS | HEART RATE: 70 BPM | DIASTOLIC BLOOD PRESSURE: 68 MMHG | HEIGHT: 64 IN | SYSTOLIC BLOOD PRESSURE: 102 MMHG | BODY MASS INDEX: 21.34 KG/M2

## 2023-04-26 DIAGNOSIS — G40.909 SEIZURE DISORDER (HCC): Primary | ICD-10-CM

## 2023-04-26 PROCEDURE — 3074F SYST BP LT 130 MM HG: CPT | Performed by: OTHER

## 2023-04-26 PROCEDURE — 3078F DIAST BP <80 MM HG: CPT | Performed by: OTHER

## 2023-04-26 PROCEDURE — 3008F BODY MASS INDEX DOCD: CPT | Performed by: OTHER

## 2023-04-26 PROCEDURE — 99214 OFFICE O/P EST MOD 30 MIN: CPT | Performed by: OTHER

## 2023-04-26 NOTE — PATIENT INSTRUCTIONS
Go up to 150mg twice a day  Call or send a [employer] message if you have a breakthrough seizure

## 2023-05-12 ENCOUNTER — TELEPHONE (OUTPATIENT)
Dept: NEUROLOGY | Facility: CLINIC | Age: 38
End: 2023-05-12

## 2023-05-12 DIAGNOSIS — G40.909 SEIZURE DISORDER (HCC): Primary | ICD-10-CM

## 2023-05-12 RX ORDER — LEVETIRACETAM 750 MG/1
750 TABLET ORAL 2 TIMES DAILY
Qty: 180 TABLET | Refills: 1 | Status: SHIPPED | OUTPATIENT
Start: 2023-05-12 | End: 2023-11-08

## 2023-05-12 NOTE — TELEPHONE ENCOUNTER
Called patient. Explained that 1 option would be to increase her dose of Vimpat to 200 mg twice a day. Another option would be to start her on Keppra and wean her off Vimpat. We will decrease the dose by 150 mg/week. Explained that she had multiple breakthrough seizures I would start her on Keppra 1000 mg twice a day. She was very concerned about the dose of Keppra. I explained that there was no equivalence between Keppra dosing and carbamazepine dosing. She previously was on 200 mg 3 times daily. I explained that although 1000 g sounds like a large number, I would rather start her out on a higher dose of Keppra to ensure that she had seizure control, rather than slowly uptitrate the dose of Keppra while she had breakthrough seizures. She wanted to increase the dose of Vimpat to 200 mg twice a day. I explained that since she has not had seizure control bbxt802 mg twice a day my suspicion that she would have seizure control with 200 mg twice a day was low. Explained that there were many adverse effects associated with even a single seizure in, including the risk of death and permanent neurological disability. I explained the potential harms of a seizure are much greater than the potential harms associated with antiseizure medication. She asked about going back on carbamazepine. I explained that carbamazepine has many more adverse effects then either lacosamide or Keppra. She states that her vitamin D levels were low, and thinks that if she has normal vitamin D levels the likelihood she will have osteoporosis or osteopenia is much lower. I explained that even if her vitamin D is normal she is still at risk of osteoporosis and osteopenia from carbamazepine. Explained that that she had serious adverse effects from osteoporosis osteopenia in the future. \    She agreed to start Keppra at 750 mg twice a day. We will decrease the dose of lacosamide by 150 mg every week.   If she has any breakthrough seizure she is to let me know. If she wants to go back on carbamazepine she can as long as she will is aware of the increased risk of adverse effects.   Lul Paniagua, DO  [facility]

## 2023-05-12 NOTE — TELEPHONE ENCOUNTER
Regarding: Seizure  Contact: [phone]  ----- Message from Mikki Jaramillo RN sent at 5/8/2023  8:21 AM CDT -----  Please advise, thank you     ----- Message from Satinder Garcia to Tucker Schwab, DO sent at 5/7/2023  9:12 AM -----   Good morning! Today at midnight I had a seizure. I have been sleeping well, so I don't understand why I am having more seizures than I did before with my old medicine. Before I used to be seizure free for years, and now it seems that almost every two weeks I am getting one. I understand your concerns with the use of carpamazaphine, but I don't see no point of keep taking this new one if it's not helping me and I am having more seizures than before. Have a great Sunday Dr. Sheyla Ozuna!

## 2023-05-15 ENCOUNTER — OFFICE VISIT (OUTPATIENT)
Dept: SURGERY | Facility: CLINIC | Age: 38
End: 2023-05-15

## 2023-05-15 VITALS — BODY MASS INDEX: 21.34 KG/M2 | HEIGHT: 64 IN | WEIGHT: 125 LBS

## 2023-05-15 DIAGNOSIS — G40.909 SEIZURE DISORDER (HCC): ICD-10-CM

## 2023-05-15 DIAGNOSIS — N64.4 BREAST PAIN, RIGHT: Primary | ICD-10-CM

## 2023-05-15 PROCEDURE — 3008F BODY MASS INDEX DOCD: CPT | Performed by: SURGERY

## 2023-05-15 PROCEDURE — 99204 OFFICE O/P NEW MOD 45 MIN: CPT | Performed by: SURGERY

## 2023-05-15 RX ORDER — LACOSAMIDE 50 MG/1
100 TABLET ORAL 2 TIMES DAILY
Qty: 360 TABLET | Refills: 0 | Status: SHIPPED | OUTPATIENT
Start: 2023-05-15

## 2023-05-15 NOTE — TELEPHONE ENCOUNTER
Medication:lacosamide (VIMPAT) 50 MG Oral Tab, 2 tablets (100 mg total) 2 (two) times daily.     RSY:[birth year]  NOV:5/26/23    Last refill/[place]MP: 2/22/23

## 2023-05-16 ENCOUNTER — TELEPHONE (OUTPATIENT)
Dept: SURGERY | Facility: CLINIC | Age: 38
End: 2023-05-16

## 2023-05-27 DIAGNOSIS — G40.909 SEIZURE DISORDER (HCC): ICD-10-CM

## 2023-05-30 RX ORDER — FOLIC ACID 1 MG/1
TABLET ORAL
Qty: 180 TABLET | Refills: 0 | Status: SHIPPED | OUTPATIENT
Start: 2023-05-30

## 2023-05-30 NOTE — TELEPHONE ENCOUNTER
LOV 4/26/23 NOV not on file    Refill request for pt  folic acid 1 MG Oral Tab , reviewed by RN and routed to provider for review.

## 2023-06-02 ENCOUNTER — HOSPITAL ENCOUNTER (OUTPATIENT)
Dept: ULTRASOUND IMAGING | Facility: HOSPITAL | Age: 38
Discharge: HOME | End: 2023-06-02
Attending: SURGERY
Payer: COMMERCIAL

## 2023-06-02 ENCOUNTER — HOSPITAL ENCOUNTER (OUTPATIENT)
Dept: MAMMOGRAPHY | Facility: HOSPITAL | Age: 38
Discharge: HOME | End: 2023-06-02
Attending: SURGERY
Payer: COMMERCIAL

## 2023-06-02 DIAGNOSIS — N64.4 BREAST PAIN, RIGHT: ICD-10-CM

## 2023-06-02 PROCEDURE — 77062 BREAST TOMOSYNTHESIS BI: CPT | Performed by: SURGERY

## 2023-06-02 PROCEDURE — 76642 ULTRASOUND BREAST LIMITED: CPT | Performed by: SURGERY

## 2023-06-02 PROCEDURE — 77066 DX MAMMO INCL CAD BI: CPT | Performed by: SURGERY

## 2023-06-29 ENCOUNTER — OFFICE VISIT (OUTPATIENT)
Dept: SURGERY | Facility: CLINIC | Age: 38
End: 2023-06-29

## 2023-06-29 VITALS — WEIGHT: 125 LBS | HEIGHT: 64 IN | BODY MASS INDEX: 21.34 KG/M2

## 2023-06-29 DIAGNOSIS — N64.4 BREAST PAIN, RIGHT: Primary | ICD-10-CM

## 2023-06-29 DIAGNOSIS — E55.9 VITAMIN D DEFICIENCY: ICD-10-CM

## 2023-06-29 PROCEDURE — 3008F BODY MASS INDEX DOCD: CPT | Performed by: SURGERY

## 2023-06-29 PROCEDURE — 99214 OFFICE O/P EST MOD 30 MIN: CPT | Performed by: SURGERY

## 2023-06-29 RX ORDER — ERGOCALCIFEROL 1.25 MG/1
50000 CAPSULE ORAL WEEKLY
Qty: 12 CAPSULE | Refills: 0 | Status: SHIPPED | OUTPATIENT
Start: 2023-06-29

## 2023-10-02 DIAGNOSIS — G40.909 SEIZURE DISORDER (HCC): ICD-10-CM

## 2023-10-02 RX ORDER — FOLIC ACID 1 MG/1
TABLET ORAL
Qty: 180 TABLET | Refills: 0 | Status: SHIPPED | OUTPATIENT
Start: 2023-10-02

## 2023-10-02 NOTE — TELEPHONE ENCOUNTER
Requested Prescriptions     Pending Prescriptions Disp Refills    FOLIC ACID 1 MG Oral Tab [Pharmacy Med Name: Folic Acid 1 Mg Tab Amne] 180 tablet 0     Sig: TAKE ONE TABLET BY MOUTH TWICE DAILY       Last OV: 4/26/23  Next OV: None  Last refilled: 5/30/23    Appointment needed for further refills.

## 2023-10-28 DIAGNOSIS — G40.909 SEIZURE DISORDER (HCC): Primary | ICD-10-CM

## 2023-10-30 RX ORDER — LEVETIRACETAM 750 MG/1
750 TABLET ORAL 2 TIMES DAILY
Qty: 180 TABLET | Refills: 0 | Status: SHIPPED | OUTPATIENT
Start: 2023-10-30

## 2023-10-30 NOTE — TELEPHONE ENCOUNTER
Requested Prescriptions     Pending Prescriptions Disp Refills    LEVETIRACETAM 750 MG Oral Tab [Pharmacy Med Name: Levetiracetam 750 Mg Tab Nort] 180 tablet 0     Sig: TAKE ONE TABLET BY MOUTH TWICE DAILY     LOV: 4/26/23  Return in about 1 month (around 5/26/2023).    NOV: none    Last refill/ILPMP: 5/12/23 (/1RF)

## 2023-11-01 ENCOUNTER — PATIENT MESSAGE (OUTPATIENT)
Dept: NEUROLOGY | Facility: CLINIC | Age: 38
End: 2023-11-01

## 2023-11-01 DIAGNOSIS — G40.909 SEIZURE DISORDER (HCC): ICD-10-CM

## 2023-11-02 RX ORDER — LEVETIRACETAM 750 MG/1
750 TABLET ORAL 2 TIMES DAILY
Qty: 180 TABLET | Refills: 0 | Status: CANCELLED
Start: 2023-11-02

## 2023-11-02 NOTE — TELEPHONE ENCOUNTER
From: Nathan Mckeon  To: Abhishek Alfaro  Sent: 11/1/2023 7:33 PM CDT  Subject: Follow up     I've received a message regarding my refills. My medication won't be refilled again, unless I schedule a follow up. Since the last time I saw you was on april, I was told I am not due for a follow up until next April, my follow up appointments have always been yearly. Was told by the  to call back on January, it's the time they have the new schedule for next year.

## 2023-11-02 NOTE — TELEPHONE ENCOUNTER
Can you please confirm with the patient if she is taking Keppra. As per the notes from Dr. Isabel Batista currently she is not on Keppra. If she is taking it I will refill the medication. Let me know.   Thank you

## 2023-11-02 NOTE — TELEPHONE ENCOUNTER
Requested Prescriptions             Pending Prescriptions Disp Refills    LEVETIRACETAM 750 MG Oral Tab [Pharmacy Med Name: Levetiracetam 750 Mg Tab Nort] 180 tablet 0       Sig: TAKE ONE TABLET BY MOUTH TWICE DAILY      LOV: 4/26/23  NOV: none

## 2023-11-02 NOTE — TELEPHONE ENCOUNTER
Spoke to pt. Pt currently taking levetiracetam 750mg po BID as noted in telephone encounter on 5/12/23. She no longer needs prescription as rx sent by alternate provider.

## 2023-11-06 ENCOUNTER — OFFICE VISIT (OUTPATIENT)
Dept: ENDOCRINOLOGY CLINIC | Facility: CLINIC | Age: 38
End: 2023-11-06
Payer: COMMERCIAL

## 2023-11-06 VITALS
SYSTOLIC BLOOD PRESSURE: 95 MMHG | WEIGHT: 107 LBS | BODY MASS INDEX: 18 KG/M2 | DIASTOLIC BLOOD PRESSURE: 60 MMHG | HEART RATE: 65 BPM

## 2023-11-06 DIAGNOSIS — M81.0 AGE-RELATED OSTEOPOROSIS WITHOUT CURRENT PATHOLOGICAL FRACTURE: Primary | ICD-10-CM

## 2023-11-06 DIAGNOSIS — E55.9 VITAMIN D DEFICIENCY: ICD-10-CM

## 2023-11-06 PROCEDURE — 3074F SYST BP LT 130 MM HG: CPT | Performed by: INTERNAL MEDICINE

## 2023-11-06 PROCEDURE — 3078F DIAST BP <80 MM HG: CPT | Performed by: INTERNAL MEDICINE

## 2023-11-06 PROCEDURE — 99213 OFFICE O/P EST LOW 20 MIN: CPT | Performed by: INTERNAL MEDICINE

## 2023-11-06 RX ORDER — ERGOCALCIFEROL 1.25 MG/1
CAPSULE ORAL
Qty: 6 CAPSULE | Refills: 3 | Status: SHIPPED | OUTPATIENT
Start: 2023-11-06

## 2023-11-07 ENCOUNTER — LAB ENCOUNTER (OUTPATIENT)
Dept: LAB | Facility: HOSPITAL | Age: 38
End: 2023-11-07
Attending: INTERNAL MEDICINE
Payer: COMMERCIAL

## 2023-11-07 DIAGNOSIS — M81.0 AGE-RELATED OSTEOPOROSIS WITHOUT CURRENT PATHOLOGICAL FRACTURE: ICD-10-CM

## 2023-11-07 DIAGNOSIS — E55.9 VITAMIN D DEFICIENCY: ICD-10-CM

## 2023-11-07 LAB
ANION GAP SERPL CALC-SCNC: 8 MMOL/L (ref 0–18)
BUN BLD-MCNC: 10 MG/DL (ref 9–23)
BUN/CREAT SERPL: 14.7 (ref 10–20)
CALCIUM BLD-MCNC: 9.6 MG/DL (ref 8.7–10.4)
CHLORIDE SERPL-SCNC: 107 MMOL/L (ref 98–112)
CO2 SERPL-SCNC: 26 MMOL/L (ref 21–32)
CREAT BLD-MCNC: 0.68 MG/DL
EGFRCR SERPLBLD CKD-EPI 2021: 114 ML/MIN/1.73M2 (ref 60–?)
FASTING STATUS PATIENT QL REPORTED: YES
GLUCOSE BLD-MCNC: 85 MG/DL (ref 70–99)
OSMOLALITY SERPL CALC.SUM OF ELEC: 290 MOSM/KG (ref 275–295)
POTASSIUM SERPL-SCNC: 3.8 MMOL/L (ref 3.5–5.1)
PTH-INTACT SERPL-MCNC: 42.7 PG/ML (ref 18.5–88)
SODIUM SERPL-SCNC: 141 MMOL/L (ref 136–145)
VIT D+METAB SERPL-MCNC: 62.2 NG/ML (ref 30–100)

## 2023-11-07 PROCEDURE — 36415 COLL VENOUS BLD VENIPUNCTURE: CPT

## 2023-11-07 PROCEDURE — 82306 VITAMIN D 25 HYDROXY: CPT

## 2023-11-07 PROCEDURE — 84080 ASSAY ALKALINE PHOSPHATASES: CPT

## 2023-11-07 PROCEDURE — 83970 ASSAY OF PARATHORMONE: CPT

## 2023-11-07 PROCEDURE — 80048 BASIC METABOLIC PNL TOTAL CA: CPT

## 2023-11-11 LAB — ALKALINE PHOSPHATASE BONE SPECIFIC: 9.7 UG/L

## 2023-12-20 DIAGNOSIS — G40.909 SEIZURE DISORDER (HCC): ICD-10-CM

## 2023-12-20 RX ORDER — FOLIC ACID 1 MG/1
TABLET ORAL
Qty: 180 TABLET | Refills: 0 | OUTPATIENT
Start: 2023-12-20

## 2023-12-20 NOTE — TELEPHONE ENCOUNTER
Requested Prescriptions     Pending Prescriptions Disp Refills    FOLIC ACID 1 MG Oral Tab [Pharmacy Med Name: Folic Acid 1 Mg Tab Amne] 180 tablet 0     Sig: Take one tablet by mouth twice daily.  APPOINTMENT NEEDED FOR FURTHER REFILLS        LOV: 4/26/23  NOV: none      Last refill/[place]PMP: 10/2/23 qty 180

## 2023-12-29 ENCOUNTER — HOSPITAL ENCOUNTER (OUTPATIENT)
Dept: ULTRASOUND IMAGING | Facility: HOSPITAL | Age: 38
Discharge: HOME | End: 2023-12-29
Attending: SURGERY
Payer: COMMERCIAL

## 2023-12-29 ENCOUNTER — HOSPITAL ENCOUNTER (OUTPATIENT)
Dept: MAMMOGRAPHY | Facility: HOSPITAL | Age: 38
Discharge: HOME | End: 2023-12-29
Attending: SURGERY
Payer: COMMERCIAL

## 2023-12-29 DIAGNOSIS — N64.4 BREAST PAIN, RIGHT: ICD-10-CM

## 2023-12-29 PROCEDURE — 77062 BREAST TOMOSYNTHESIS BI: CPT | Performed by: SURGERY

## 2023-12-29 PROCEDURE — 77066 DX MAMMO INCL CAD BI: CPT | Performed by: SURGERY

## 2023-12-29 PROCEDURE — 76642 ULTRASOUND BREAST LIMITED: CPT | Performed by: SURGERY

## 2024-01-04 DIAGNOSIS — G40.909 SEIZURE DISORDER (HCC): ICD-10-CM

## 2024-01-04 RX ORDER — FOLIC ACID 1 MG/1
TABLET ORAL
Qty: 180 TABLET | Refills: 0 | Status: SHIPPED | OUTPATIENT
Start: 2024-01-04

## 2024-01-04 NOTE — TELEPHONE ENCOUNTER
Requested Prescriptions     Pending Prescriptions Disp Refills    folic acid 1 MG Oral Tab 180 tablet 0     Sig: Take one tablet by mouth twice daily. APPOINTMENT NEEDED FOR FURTHER REFILLS        LOV: 4/26/23  NOV: 3/15/24    Last refill/ILPMP: 10/2/23

## 2024-01-20 DIAGNOSIS — G40.909 SEIZURE DISORDER (HCC): ICD-10-CM

## 2024-01-22 RX ORDER — LEVETIRACETAM 750 MG/1
750 TABLET ORAL 2 TIMES DAILY
Qty: 180 TABLET | Refills: 0 | Status: SHIPPED | OUTPATIENT
Start: 2024-01-22

## 2024-02-19 ENCOUNTER — LAB ENCOUNTER (OUTPATIENT)
Dept: LAB | Facility: HOSPITAL | Age: 39
End: 2024-02-19
Attending: FAMILY MEDICINE
Payer: COMMERCIAL

## 2024-02-19 ENCOUNTER — TELEPHONE (OUTPATIENT)
Dept: OBGYN CLINIC | Facility: CLINIC | Age: 39
End: 2024-02-19

## 2024-02-19 DIAGNOSIS — Z32.00 ENCOUNTER FOR CONFIRMATION OF PREGNANCY TEST RESULT WITH PHYSICAL EXAMINATION: ICD-10-CM

## 2024-02-19 DIAGNOSIS — Z00.00 ENCOUNTER FOR ANNUAL HEALTH EXAMINATION: ICD-10-CM

## 2024-02-19 DIAGNOSIS — E55.9 VITAMIN D DEFICIENCY: ICD-10-CM

## 2024-02-19 LAB
ALBUMIN SERPL-MCNC: 4.7 G/DL (ref 3.2–4.8)
ALBUMIN/GLOB SERPL: 1.6 {RATIO} (ref 1–2)
ALP LIVER SERPL-CCNC: 52 U/L
ALT SERPL-CCNC: 13 U/L
ANION GAP SERPL CALC-SCNC: 7 MMOL/L (ref 0–18)
AST SERPL-CCNC: 21 U/L (ref ?–34)
BASOPHILS # BLD AUTO: 0.03 X10(3) UL (ref 0–0.2)
BASOPHILS NFR BLD AUTO: 0.5 %
BILIRUB SERPL-MCNC: 0.9 MG/DL (ref 0.3–1.2)
BUN BLD-MCNC: 9 MG/DL (ref 9–23)
BUN/CREAT SERPL: 13.8 (ref 10–20)
CALCIUM BLD-MCNC: 9.6 MG/DL (ref 8.7–10.4)
CHLORIDE SERPL-SCNC: 107 MMOL/L (ref 98–112)
CHOLEST SERPL-MCNC: 154 MG/DL (ref ?–200)
CO2 SERPL-SCNC: 23 MMOL/L (ref 21–32)
CREAT BLD-MCNC: 0.65 MG/DL
DEPRECATED RDW RBC AUTO: 40.3 FL (ref 35.1–46.3)
EGFRCR SERPLBLD CKD-EPI 2021: 116 ML/MIN/1.73M2 (ref 60–?)
EOSINOPHIL # BLD AUTO: 0.02 X10(3) UL (ref 0–0.7)
EOSINOPHIL NFR BLD AUTO: 0.3 %
ERYTHROCYTE [DISTWIDTH] IN BLOOD BY AUTOMATED COUNT: 12.4 % (ref 11–15)
EST. AVERAGE GLUCOSE BLD GHB EST-MCNC: 105 MG/DL (ref 68–126)
FASTING PATIENT LIPID ANSWER: YES
FASTING STATUS PATIENT QL REPORTED: YES
GLOBULIN PLAS-MCNC: 2.9 G/DL (ref 2.8–4.4)
GLUCOSE BLD-MCNC: 74 MG/DL (ref 70–99)
HBA1C MFR BLD: 5.3 % (ref ?–5.7)
HCG SERPL QL: POSITIVE
HCT VFR BLD AUTO: 38.8 %
HDLC SERPL-MCNC: 72 MG/DL (ref 40–59)
HGB BLD-MCNC: 13.5 G/DL
IMM GRANULOCYTES # BLD AUTO: 0.02 X10(3) UL (ref 0–1)
IMM GRANULOCYTES NFR BLD: 0.3 %
LDLC SERPL CALC-MCNC: 73 MG/DL (ref ?–100)
LYMPHOCYTES # BLD AUTO: 1.5 X10(3) UL (ref 1–4)
LYMPHOCYTES NFR BLD AUTO: 23.5 %
MCH RBC QN AUTO: 31.4 PG (ref 26–34)
MCHC RBC AUTO-ENTMCNC: 34.8 G/DL (ref 31–37)
MCV RBC AUTO: 90.2 FL
MONOCYTES # BLD AUTO: 0.38 X10(3) UL (ref 0.1–1)
MONOCYTES NFR BLD AUTO: 6 %
NEUTROPHILS # BLD AUTO: 4.43 X10 (3) UL (ref 1.5–7.7)
NEUTROPHILS # BLD AUTO: 4.43 X10(3) UL (ref 1.5–7.7)
NEUTROPHILS NFR BLD AUTO: 69.4 %
NONHDLC SERPL-MCNC: 82 MG/DL (ref ?–130)
OSMOLALITY SERPL CALC.SUM OF ELEC: 281 MOSM/KG (ref 275–295)
PLATELET # BLD AUTO: 239 10(3)UL (ref 150–450)
POTASSIUM SERPL-SCNC: 4 MMOL/L (ref 3.5–5.1)
PROT SERPL-MCNC: 7.6 G/DL (ref 5.7–8.2)
RBC # BLD AUTO: 4.3 X10(6)UL
SODIUM SERPL-SCNC: 137 MMOL/L (ref 136–145)
TRIGL SERPL-MCNC: 37 MG/DL (ref 30–149)
TSI SER-ACNC: 0.64 MIU/ML (ref 0.55–4.78)
VIT D+METAB SERPL-MCNC: 43.7 NG/ML (ref 30–100)
VLDLC SERPL CALC-MCNC: 6 MG/DL (ref 0–30)
WBC # BLD AUTO: 6.4 X10(3) UL (ref 4–11)

## 2024-02-19 PROCEDURE — 84443 ASSAY THYROID STIM HORMONE: CPT

## 2024-02-19 PROCEDURE — 83036 HEMOGLOBIN GLYCOSYLATED A1C: CPT

## 2024-02-19 PROCEDURE — 80061 LIPID PANEL: CPT

## 2024-02-19 PROCEDURE — 84703 CHORIONIC GONADOTROPIN ASSAY: CPT

## 2024-02-19 PROCEDURE — 85025 COMPLETE CBC W/AUTO DIFF WBC: CPT

## 2024-02-19 PROCEDURE — 82306 VITAMIN D 25 HYDROXY: CPT

## 2024-02-19 PROCEDURE — 80053 COMPREHEN METABOLIC PANEL: CPT

## 2024-02-19 PROCEDURE — 36415 COLL VENOUS BLD VENIPUNCTURE: CPT

## 2024-02-19 NOTE — TELEPHONE ENCOUNTER
LMP 1/19/24. 4w3d. Cycles every 24-29 days. Pt agrees to see both male and female providers. Advised pt that we recommend pnv with DHA, folic acid and iron. Booked pt for OBN appt on 3/9/24 and new OB on 3/22/24. Pt agrees to call clinic back should she have any additional questions or concerns.

## 2024-02-20 ENCOUNTER — TELEPHONE (OUTPATIENT)
Dept: FAMILY MEDICINE CLINIC | Facility: CLINIC | Age: 39
End: 2024-02-20

## 2024-02-20 DIAGNOSIS — Z3A.01 LESS THAN 8 WEEKS GESTATION OF PREGNANCY (HCC): Primary | ICD-10-CM

## 2024-02-20 NOTE — TELEPHONE ENCOUNTER
1. Less than 8 weeks gestation of pregnancy (HCC)  - Prenatal Multivit-Min-Fe-FA (PRE-MILKA FORMULA) Oral Tab; Take 1 tablet by mouth daily.  Dispense: 90 tablet; Refill: 3

## 2024-02-20 NOTE — TELEPHONE ENCOUNTER
Per patient she has had a positive pregnancy test, not seeing OB-GYN for another month. She would like to know if you can prescribe pre  vitamins. She states she has had issues with OTC prenatal vitamins with previous pregnancies.    Patient hs the following upcoming appointment.     Future Appointments   Date Time Provider Department Center   3/1/2024  9:30 AM [facility]   3/9/2024 11:00 AM  OB EDUCATION ROOM [facility]   3/15/2024  9:20 AM Abel Flores DO [facility]   3/22/2024  2:40 PM Toyin Nguyễn MD [facility]       Verified preferred pharmacy and patient allergies.     Pended for review.

## 2024-03-01 ENCOUNTER — OFFICE VISIT (OUTPATIENT)
Dept: FAMILY MEDICINE CLINIC | Facility: CLINIC | Age: 39
End: 2024-03-01
Payer: COMMERCIAL

## 2024-03-01 ENCOUNTER — PATIENT MESSAGE (OUTPATIENT)
Dept: NEUROLOGY | Facility: CLINIC | Age: 39
End: 2024-03-01

## 2024-03-01 VITALS
BODY MASS INDEX: 18 KG/M2 | HEART RATE: 83 BPM | DIASTOLIC BLOOD PRESSURE: 66 MMHG | WEIGHT: 105 LBS | SYSTOLIC BLOOD PRESSURE: 107 MMHG

## 2024-03-01 DIAGNOSIS — Z23 NEED FOR DIPHTHERIA-TETANUS-PERTUSSIS (TDAP) VACCINE: ICD-10-CM

## 2024-03-01 DIAGNOSIS — Z00.00 ENCOUNTER FOR ANNUAL HEALTH EXAMINATION: Primary | ICD-10-CM

## 2024-03-01 DIAGNOSIS — R56.9 SEIZURE (HCC): ICD-10-CM

## 2024-03-01 PROCEDURE — 3078F DIAST BP <80 MM HG: CPT | Performed by: FAMILY MEDICINE

## 2024-03-01 PROCEDURE — 3074F SYST BP LT 130 MM HG: CPT | Performed by: FAMILY MEDICINE

## 2024-03-01 PROCEDURE — 99395 PREV VISIT EST AGE 18-39: CPT | Performed by: FAMILY MEDICINE

## 2024-03-01 NOTE — TELEPHONE ENCOUNTER
From: Sveta Mckeon  To: Abel Flores  Sent: 3/1/2024 11:16 AM CST  Subject: Medication    Good morning Dr. Flores,  I am pregnant and am wondering if it's safe to keep using the medication I am under for seizure or should the dose be lowered.

## 2024-03-01 NOTE — PROGRESS NOTES
HPI:   Sveta Mckeon is a 39 year old female who presents for a complete physical exam.    Work: homemaker, used to work as dental hygeniest  Social: Lives with 4 kids , hbd,   Diet: eats home cooked meals.  Exercise: Sedentary  GYN history:   LMP: 24  Last Pap smear was: 2019 with gyne and normal  History of abnormal pap smears: none.        Last seizure was  while sleeping, wakes up in post ictal state.   Feels like she is getting more seizures on keppra 750 mg every day.     Wt Readings from Last 3 Encounters:   24 105 lb (47.6 kg)   23 107 lb (48.5 kg)   23 125 lb (56.7 kg)     Body mass index is 18.02 kg/m².       Current Outpatient Medications   Medication Sig Dispense Refill    Prenatal Multivit-Min-Fe-FA (PRE- FORMULA) Oral Tab Take 1 tablet by mouth daily. 90 tablet 3    levetiracetam 750 MG Oral Tab Take 1 tablet (750 mg total) by mouth 2 (two) times daily. 180 tablet 0    folic acid 1 MG Oral Tab Take one tablet by mouth twice daily. 180 tablet 0    ergocalciferol 1.25 MG (59981 UT) Oral Cap Take one capsule 2 times per month (Patient not taking: Reported on 3/1/2024) 6 capsule 3    mometasone 0.1 % External Ointment Apply 1 Application topically daily. (Patient not taking: Reported on 3/1/2024) 30 g 1      Past Medical History:   Diagnosis Date    Environmental allergies     History of pregnancy , ,         Seasonal allergies     Seizure disorder (HCC)     last seizure 2015      Past Surgical History:   Procedure Laterality Date    HERNIA SURGERY  2016    Umbilical          VENTRAL HERNIA REPAIR  3/18/2016    (Supraumbilical/umbilical)      Family History   Problem Relation Age of Onset    Diabetes Maternal Grandmother     Pancreatic Cancer Paternal Grandmother     Cancer Paternal Grandmother         unknown type      Social History:   Social History     Socioeconomic History    Marital status:     Number of children: 3    Occupational History    Occupation: dental assistant   Tobacco Use    Smoking status: Never     Passive exposure: Never    Smokeless tobacco: Never   Vaping Use    Vaping Use: Never used   Substance and Sexual Activity    Alcohol use: No     Alcohol/week: 0.0 standard drinks of alcohol    Drug use: No   Other Topics Concern    Caffeine Concern No    Exercise No    Reaction to local anesthetic No   Social History Narrative    The patient does not use an assistive device..      The patient does not live in a home with stairs.          REVIEW OF SYSTEMS:   Negative, except per HPI.     EXAM:   /66 (BP Location: Right arm, Patient Position: Sitting, Cuff Size: adult)   Pulse 83   Wt 105 lb (47.6 kg)   LMP 01/19/2024   Breastfeeding No   BMI 18.02 kg/m²     GENERAL: well developed, well nourished, in no apparent distress  SKIN: no rashes, no suspicious lesions  HEENT: atraumatic, normocephalic, ears are clear  EYES: PERRLA, EOMI,conjunctiva are clear  NECK: supple, no adenopathy  LUNGS: clear to auscultation  CARDIO: RRR without murmur  GI: good BS, no masses or tenderness  MUSCULOSKELETAL: back is not tender, FROM of the back  EXTREMITIES: no cyanosis or edema  NEURO: Oriented times three, cranial nerves are intact, motor and sensory are grossly intact    ASSESSMENT AND PLAN:   Sveta Mckeon is a 39 year old female who presents for a complete physical exam.    1. Encounter for annual health examination  -Medical, surgical and social history, as well as medications and allergies were reviewed with patient.  - lab review  Seeing OBG for current pregnancy     2. Need for diphtheria-tetanus-pertussis (Tdap) vaccine  Defer to OB    3. Seizure (HCC)  F/u with neuro regarding safety of keppra    RTC if no improvement in symptoms. Red flags discussed to go to ER.        Verna Vazquez MD  3/1/2024  10:23 AM

## 2024-03-09 ENCOUNTER — NURSE ONLY (OUTPATIENT)
Dept: OBGYN CLINIC | Facility: CLINIC | Age: 39
End: 2024-03-09
Payer: COMMERCIAL

## 2024-03-09 VITALS — WEIGHT: 105 LBS | HEIGHT: 64 IN | BODY MASS INDEX: 17.93 KG/M2

## 2024-03-09 DIAGNOSIS — Z3A.01 LESS THAN 8 WEEKS GESTATION OF PREGNANCY (HCC): Primary | ICD-10-CM

## 2024-03-09 PROCEDURE — 3008F BODY MASS INDEX DOCD: CPT | Performed by: OBSTETRICS & GYNECOLOGY

## 2024-03-09 NOTE — PROGRESS NOTES
Pt seen for [facility] appt today with no complaints. Normal PN labs plus GTT ordered. Pt advised all labs must be completed and resulted prior to NPN appt. If labs are not completed and resulted the NPN appt will be cancelled. Pt informed again of both male and female providers and the need to rotate PN appt with all providers since OB on-call will be the one that delivers her. Assisted pt with scheduling NPN appt with MD.       Height: 5'4\"  Weight: 105.0lb  BMI: 18    Partner's name is Ronald Garcia contact #[phone]; race:   Occupation: stay at home mom     MEDICAL HISTORY    Anemia No    Anesthetic complications No    Anxiety/Depression  No    Autoimmune Disorder No    Asthma  No    Cancer No    Diabetes  No    Gyne/breast Surgery No    Heart Disease No    Hepatitis/Liver Disease  No    History of blood transfusion No    History of abnormal pap No    Hypertension  No    Infertility  No    Kidney Disease/Frequent UTIs  No    Medication Allergies No    Latex Allergies No    Food Allergies  No    Neurological Disorder/Epilepsy Epilepsy     Operations/Hospitalizations Yes Hernia repair 2016   TB exposure  No    Thyroid Dysfunction No    Trauma/Violence  No    Uterine Anomaly  No    Uterine Fibroids  No    Variocosities/DVTs No    Smoker No    Drug usage in prior year No    Alcohol No    Would you accept a blood transfusion? If no, are you a Zoroastrianism? Yes            INFECTION HISTORY    Chlamydia No    Pt or partner have hx of Genital Herpes No    Gonorrhea No    Hepatitis B No    HIV No    HPV No    MRSA No    Syphilis No    Tattoos No    Live with someone or Exposed to TB No    Rash or viral illness since LMP  No    Varicella Yes childhood   Pets No        GENETICS SCREENING    Genetic Screening    Genetic Screening/Teratology Counseling- Includes patient, baby's father, or anyone in either family with:  Patient's age 35 years or older as of estimated date of delivery: No     Thalassemia (Estonian,  Mongolian, Mediterranean, or  background): MCV less than 80: No     Neural tube defect (Meningomyelocele, Spina bifida, or Anencephaly): No     Congenital heart defect: No     Down syndrome: No     Josias-Sachs (Ashkenazi Sikh, Cajun, Icelandic Central African): No     Canavan disease (Ashkenazi Sikh): No     Familial dysautonomia (Ashkenazi Sikh): No     Sickle cell disease or trait (): No     Hemophilia or other blood disorders: No     Muscular dystrophy: No    Cystic fibrosis: No     [place]'s chorea: No     Intellectual disability and/or autism: No     Other inherited genetic or chromosomal disorder: No     Maternal metabolic disorder (eg. Type 1 diabetes, PKU): No     Patient or baby's father had child with birth defects not listed above: No     Recurrent pregnancy loss, or a stillbirth: No     Medications (including supplements, vitamins, herbs, or OTC drugs)/illicit/recreational drugs/alcohol since last menstrual period: No                 MISC    Infant vaccinations  Yes

## 2024-03-15 ENCOUNTER — OFFICE VISIT (OUTPATIENT)
Dept: NEUROLOGY | Facility: CLINIC | Age: 39
End: 2024-03-15
Payer: COMMERCIAL

## 2024-03-15 VITALS — HEIGHT: 64 IN | WEIGHT: 105 LBS | BODY MASS INDEX: 17.93 KG/M2

## 2024-03-15 DIAGNOSIS — O99.351 EPILEPSY AFFECTING PREGNANCY IN FIRST TRIMESTER (HCC): ICD-10-CM

## 2024-03-15 DIAGNOSIS — G40.909 SEIZURE DISORDER (HCC): Primary | ICD-10-CM

## 2024-03-15 DIAGNOSIS — G40.909 EPILEPSY AFFECTING PREGNANCY IN FIRST TRIMESTER (HCC): ICD-10-CM

## 2024-03-15 PROCEDURE — 3008F BODY MASS INDEX DOCD: CPT | Performed by: OTHER

## 2024-03-15 PROCEDURE — 99214 OFFICE O/P EST MOD 30 MIN: CPT | Performed by: OTHER

## 2024-03-15 RX ORDER — LEVETIRACETAM 1000 MG/1
1000 TABLET ORAL 2 TIMES DAILY
Qty: 180 TABLET | Refills: 3 | Status: SHIPPED | OUTPATIENT
Start: 2024-03-15 | End: 2025-03-15

## 2024-03-15 RX ORDER — LEVETIRACETAM 750 MG/1
750 TABLET ORAL 2 TIMES DAILY
Qty: 180 TABLET | Refills: 3 | Status: SHIPPED | OUTPATIENT
Start: 2024-03-15 | End: 2024-03-15

## 2024-03-15 NOTE — PATIENT INSTRUCTIONS
Seizure safety measures   · No bathing, showers only, No unsupervised swimming-When near water, you should be supervised by an adult or person who is aware of risk of seizures, for example during tub baths, swimming, boating or fishing.   · No operating heavy machinery  · No working on heights more than 3 feet-Please do not climb to high places, such as rooftops, up trees or mountain climbing.  · No lifting weights more than 20 pounds  · A helmet should be worn when riding a bike.   · Regular sleep wake cycle    Seizure Safety During Sleep  · Objects near the bed may cause injury if someone that has a seizure is prone to falling out of bed. Move heavy furniture, floor lamps, night stands and other dangerous objects away from the bed.    · Mattresses and pillows should be firm and not soft. All stuffed animals, toys and other objects should be removed from the bed. Blankets can be layered but should be thin, down comforters may be too soft.    · Keep the bed as low to the ground as possible. Some patients with night time seizures may sleep with their mattress on the floor. Others may pad the floor with mats (such as exercise mats used in workout facilities).     · During sleep, keep the bedroom door cracked open so someone can hear if you are having a seizure.  · Locking the bedroom door is not recommended. Some people choose to use \"baby monitors\" to observe for seizures at night.     Common Seizure triggers  · Sleep deprivation - overtired, not sleeping well, not getting enough sleep  · At times of fevers or other illnesses  · Flashing bright lights or patterns  · Alcohol or drug use  · Stress  · Associated with menstrual cycle (women) or other hormonal changes  · Not eating well, low blood sugar  · Specific foods, excess caffeine or other products that may aggravate seizures  · Use of certain medications  · Missed seizure medication doses.    First aid for seizures   · Time the seizure from start to finish so you  know how long it lasted (most grand mal seizures are no more than 1 or 2 minutes long).  Remain calm and do not panic, call for assistance if needed.   Lower the person safely to the ground and loosen any tight clothing.   Place the person in a side-lying position so that any saliva or vomit will easily drain out of the mouth. Actively seizing people are at a increased risk of choking on their saliva or vomit. Do not put any objects such as a tongue depressor or fingers into the mouth. Protect the persons head from injury while they are on their side.  · After a grand mal seizure, people are very sleepy and tired for several minutes or even a couple of hours. They may also complain of headache, nausea and may vomit.   · To call 911- if any seizure lasting more than 5 minutes, if you cannot be woken up after your seizure, if you have a second seizure within 24 hours after the first, any injuries, if breathing becomes difficult or choking, if you have diabetes or are currently pregnant.    For more info on Epilepsy, please visit www.epilepsyfoundation.org    For any questions regarding seizures can contact Epilepsy Foundation - 24/7 Epilepsy and Seizures help line at 1-476.943.8962    Discussed medication dosage, usage, goals of therapy, and side effects.  Risks benefits, side effects and alternatives for this treatment is discussed      SEIZURES       GENERAL INFORMATION:   A seizure, or convulsion, is uncontrolled jerking of the arms, legs, or face that lasts anywhere from a few seconds to minutes. Seizures can occur after a head injury, stroke, or brain tissue infection. In more than half of patients, the cause is not known. This is called epilepsy.   INSTRUCTIONS:   1. Please continue to take keppra 750mg bid to prevent seizures, take it exactly as directed. Do not stop taking the medicine without talking to your doctor first, even if you have not had a seizure in a long time.   2. Avoid activities in which a  seizure would cause danger to yourself or to others. Don't operate dangerous machinery, swim alone, or climb in high or dangerous places, such as on ladders, roofs, or girders. Do not drive unless your doctor says you may.   3. Wear an emergency medical identification bracelet with information about your seizure. If you have a seizure, people around you will know what is wrong and get appropriate help.   4. If you have any warning that you may have a seizure, lay down in a safe place where you can't hurt yourself.   5. Do not drink alcohol or take illegal drugs. These can make you more susceptible to having seizures.   CONTACT YOUR DOCTOR IF:   You have any problems that may be related to the medicine you are taking.   Teach your family, close friends, or co-workers what to do if you have a seizure:   1. Stay calm. Keep the person from falling onto harmful objects. Move hard or sharp objects out of the way.   2. Don't force anything into the person's mouth or try to open clenched jaws. Turn the person on his or her side when the violent movement stops or if he or she is vomiting.   3. When the seizure is over, the person may be confused or drowsy. Reassure the person that he or she is all right. Help him or her to relax.   4. Call 911 and bring the patient back to the ED if:   a. The patient doesn't awaken shortly after the seizure   b. The patient has new problems such as difficulty seeing, speaking or moving   c. The patient was injured during the seizure   d. The patient has a temperature over 102 F (39C)   e. The patient vomited and now is having trouble breathing    Please continue to practice seizure precautions and first aid.   Please do not climb to high places, such as rooftops, up trees or mountain climbing. When near water, you should be supervised by an adult or person who is aware of risk of seizures, for example during tub baths, swimming, boating or fishing. A helmet should be worn when riding a bike.    First Aid for a grand mal seizure:   -Remain calm and do not panic, call for assistance if needed.   -Lower the person safely to the ground and loosen any tight clothing.   -Place the person in a side-lying position so any saliva or vomit will easily drain out of the mouth. Actively seizing people are at a increased risk of choking on their saliva or vomit. Do not put any objects such as a tongue depressor or fingers into the mouth. Protect the persons head from injury while they are on their side.   -Time the seizure from start to finish so you know how long it lasted (most grand mal seizures are no more than 1 or 2 minutes long). If the seizure is continuing longer than 5 minutes, call the ambulance at 911 for transportation to the nearest Emergency Room.   -After a grand mal seizure, people are very sleepy and tired for several minutes or even a couple of hours. They may also complain of headache, nausea and may vomit.   Please remember:   Call your doctor if you feel that the severity or number of seizures has changed from baseline.   If you have several grand mal seizures without waking up in-between, please notify your doctor.     ----------------------------------------------   Seizure safety during sleep:   Always take anti-epileptic medications as prescribed by your doctor.   Objects near the bed may cause injury if someone has a seizure is prone to falling out of bed. Move heavy furniture, floor lamps, night stands and other dangerous objects away from the bed.   Mattresses and pillows should be firm and not soft. All stuffed animals, toys and other objects should be removed from the bed. Blankets can be layered but should be thin, down comforters may be too soft.   Keep the bed as low to the ground as possible. Some patients with night time seizures may sleep with their mattress on the floor. Others may pad the floor with mats (such as exercise mats used in workout facilities) to pad the floor.   During  sleep, keep the bedroom door cracked open so someone can hear if you are having a seizure. Never lock the bedroom door. Some people choose to use baby monitors to observe for seizures at night.       Seizure safety w regards to safety of their infant:  Parent w seizures cannot bathe their child w/o assistance/supervision. Discussed risk their child may drown if the caregiver has a seizure while supervising them.  Do not change infant/child on a changing table; they have to change them on the ground.  Do not carry child/infant in a carrier until seizures are under control.  Do not carry child/infant up the stairs until seizures are under control.

## 2024-03-15 NOTE — PROGRESS NOTES
[facility]  [address] [place] [address], [id number]  [facility] [address]  [phone]        Neurology Clinic Follow Up Note    Chief Complaint:  Seizures (LOV 4/26/23 - The pt presents for a routine follow up for hx of seizures. Currently taking Vimpat 150mg bid. Pt had a seizure in September - states it was triggered by stress. Pt confirmed driving. Pt is currently pregnant and would like to discuss if she should remain on the same dose of medication. )      HPI:   Sveta Mckeon is a 39 year old RH woman  W/ a pmhx of nocturnal seizures and osteopenia who presents in follow up for a breakthrough seizure in the setting of acute stressors.  She has had these breakthrough seizures w therapeutic levels  in the past.    She had a seizure on on 1/23/23.   Her husbands nephew passed away earlier that month.   Sveta reports she missed at least 2 dose of her Carbamazepine.     Also reports she would have a seizure after  she delivered.    She had a Carbamezapine level drawn on  1/23/23 and it was therapeutic.       Date of first seizure:  When she was 17 YO in 2001 or in 2004 per CR.  Presence of aura:  Wakes up from sleep. \"when I asleep I will start to feel it. I am sleep and I wake up from the seizure.\"   Ictal manifestation:  Nocturnal GTC and starring spells \"where she space our for seconds,\" followed by GTC per 10/17/14 note.  Diurnal variation:  Almost exclusively nocturnal seizures; also may have had seizures with loss of awareness followed by GTC  Triggering factors:  Sleep deprivation in the post partum stetting, breakthrough seizures  With therapeutic levels of cbz in the past   Number of seizures in past 1 month: 0  Number of seizures in past 6 months: September 2023  Seizure frequency:   2004  2011   4/6/15 (1 mo postpartum, was sleep deprived, level was therapeutic)   October 2017 9/28/2018 - per pt d/t hormonal imbalance  From 10/3/18 to 12/3/19 she had 2 nocturnal seizures  6/2020 -  nocturnal seizure  1/23/23  First or second week of  April 2023 September 2023-  did not tell the clinic  Maximum seizure free period:  7 yrs from 2004 to 2011  Seizure types:  Loss of awareness, GTC   Injuries related to seizures:   a scalp hematoma uin 2015; she hit her head on the nightstand. No tongue biting.  Frequency of visits to the Emergency Department:  1/23/23  AEDS used in the past:  Tegretol (Carbamezapine)  200mg TID.  Last abnormal EEG:  She has had an abnormal EEG in the past per 10/7/15 note.  Last EEG: 3/29/23 - routine was normal.  Current AEDS:   vimpat 150mg BID - stopped due to nightmares;  keppra 750 mg  bid       03/15/24  Interval History/Subjective :     She had 1  seizure in September 2023 while she was asleep.  It was due to stress.  She is 8 weeks pregnant.  Not planned pregnancy  Was taking folic acid prior to pregnancy  Was not using birth control  Explained that she must stay on her anti-seizure medications during her pregnancy  That seizures during pregnancy are very bad for mom and the fetus  That kids whose moms took their anti-seizure meds had higher IQs compared to mom who did not  That she needed to have  ZERO seizures from here on out  Explained we do not have any safety data on vimpat in pregnancy  and that there is a higher rate of birth defects on  anti seizure medication compared to the general population ; we do not know if it is higher risk or lower risk   She is currently on keppra 750mg bid         04/26/23 Interval History/Subjective :   She is having vivid nightmares since starting Vimpat.   She had 1 breakthrough seizure. This was the first week or second week of April. She did not contact the office. She was not driving but now has resumed driving.    The seizure happened early morning/around midnight.    She has stressful/unpleasant dreams every evening. She has more stressors.    She had a seizure  was at night while she was asleep. She is waking up. She will  react to what is going on in her dream and \"will wake up reacting to it as well. If I am about to scream in a dream I will gasp.\" she has not woken up while moving her arms and legs or talking. The dreams are not very vivid. She will need to talk to her  to calm her down after the dream.    May have developed nightmares after she was on 100 mg twice a day.    Reports in the past she thought chocolate would trigger her seizures.        ROS: Pertinent positive and negatives per HPI.  All others were reviewed and negative.     Medications:  Current Outpatient Medications   Medication Instructions    folic acid 1 MG Oral Tab Take one tablet by mouth twice daily.    levetiracetam (KEPPRA) 750 mg, Oral, 2 times daily    mometasone 0.1 % External Ointment 1 Application , Topical, Daily    Prenatal Multivit-Min-Fe-FA (PRE- FORMULA) Oral Tab 1 tablet, Oral, Daily       Reviewed and assessed      Objective:  Last vitals and weight :  Body mass index is 18.02 kg/m².   There were no vitals filed for this visit.   Height 64\", weight 105 lb (47.6 kg), last menstrual period 2024, not currently breastfeeding.  Ht 64\"   Wt 105 lb (47.6 kg)   LMP 2024   BMI 18.02 kg/m²   Exam:  - General: appears stated age and no distress     - Pulmonary: Normal excursion of the chest.  No signs of respiratory distress.  Neurologic Exam  - Mental Status: Alert and attentive.  Oriented x4 .  Speech is spontaneous, fluent, and prosodic. Comprehension and repetition intact. Phrase length and rate are normal. No paraphasic errors, neologisms, anomia, acalculia, apraxia, anosognosia, or R/L confusion.   - Cranial Nerves: No gaze preference. Visual fields: full  Pupils are 4mm briskly constricting to 3mm and equally round and reactive to light  in a well lit room. No rAPD. EOMI. No nystagmus. No ptosis. V1-V3 intact B/L to light touch.No pathological facial asymmetry. No flattening of the nasolabial fold. .  Hearing grossly  intact.  Tongue midline. No atrophy or fasiculations of the tongue noted. Palate and uvula elevate symmetrically.  Shoulder shrug symmetric.     - Motor:  normal tone/bulk. No interosseous wasting. No flattening of hypothenar eminences.   Motor Strength    Pronator drift: No pronator drift   Arm Rolling: No orbiting.   Finger Taps: Finger taps are symmetric in rate and amplitude.    Rapid movements: symmetric. No fatiguing.   Right Left     Motor Strength       5 5   Hip Flexors 5 5          Foot Taps:    Asterixis: No asterixis noted.     - Sensory:   Light touch: normal        - Cerebellum: No truncal ataxia. No titubations. No dysmetria, no dysdiadochokinesis. No rebound.   - Gait/station: Normal gait and station. Symmetric arm swing.             Most recent lab results:   Reviewed and assessed  No results found for this or any previous visit (from the past 36 hour(s)).    Diagnostic studies:  Performed an independent visualization of  Head ct from 11.6.2021  Imaging revealed:   Agree w radiology read  Assessment   Sveta Mckeon is a  39 year old RH woman  W/ a pmhx of nocturnal seizures, osteopenia who is currently 8 weeks prengant presents in follow up for her epilepsy.    She had last  had a breakthrough seizure in September 2023 which she attributed to stress. Prior seizure occured the setting of acute stressors (death of her nephew). She had osteopenia and was switched from Carbamezapine to vimpat but still had  1 breakthrough seizure in early April 2023. This was a nocturnal seizure while she was asleep. She is having nightmares/unpleasant dreams on lacosamide.  Per pubmed search and review on online databases there is no mention of nightmares or abnormal dreams on vimpat. She is having  Nearly nightly nightmares. Denies sleep deprivation due to the nightmares.   She is now sleeping  < 7 hrs a night with the nightmares. Normally  she gets 8 hrs. She would like to go up on the dose of vimpat  rather than changing her antiseizure medication.    Switched to keppra b/c of persistent nightmares.    At her 3/15/2020 visit she reports that she is 8 weeks pregnant.. She needs to stay on folic acid and take her prenatal vitamin. I would like her to follow with Dr. Palacios, who is an epileptologist and has expertise in managing anti seizure medications during pregnancy. Long term she will now follow with Dr. Palacios.   In the past she was counseled to use birth control.  Going forward recommend that she continue to use birth control and let her neurologist know if she is planning to have any other children in the future to help manage her antiseizure medications.           Plan   1. Nocturnal seizures  Diagnostics:   keppra level; will draw a random level.  Ideally patient would have a trough level.  Keppra levels are useful in elderly patients, patient is on multiple medications that have CYP interactions, and pregnant patients.  Therapeutics  Was on Keppra 750mg bid; given that she had a breakthrough seizure on 750 twice daily will increase the dose to thousand.  Initially she was given a new prescription for 750 twice daily.  No driving restriction bc she has only nocturnal seizures but advised her to minimize driving  No baths showers only  No climbing to heights  Avoid sleep deprivation  Seizure safety w regards to safety of their infant:  Parent w seizures cannot bathe their child w/o assistance/supervision. Discussed risk their child may drown if the caregiver has a seizure while supervising them.  Do not change infant/child on a changing table; they have to change them on the ground.  Do not carry child/infant in a carrier until seizures are under control.  Do not carry child/infant up the stairs until seizures are under control.                 Education Provided  Education/Instructions given to: patient   Barriers to Learning:  none  Content: Refer to note above   Evaluation/Outcome:  Verbalized  understanding     This document is not intended to support charting by exception.  Sections left blank in a completed note should be presumed not to have been done.      Disclaimer:   This record was dictated using  Dragon software. There may be errors due to voice recognition problems that were not realized and corrected during the completion of the note.           Thank you for allowing me to participate in the care of your patient.    Abel Flores DO  03/15/24

## 2024-03-16 ENCOUNTER — PATIENT MESSAGE (OUTPATIENT)
Dept: NEUROLOGY | Facility: CLINIC | Age: 39
End: 2024-03-16

## 2024-03-16 DIAGNOSIS — G40.909 SEIZURE DISORDER (HCC): ICD-10-CM

## 2024-03-18 ENCOUNTER — TELEPHONE (OUTPATIENT)
Dept: NEUROLOGY | Facility: CLINIC | Age: 39
End: 2024-03-18

## 2024-03-18 ENCOUNTER — LAB ENCOUNTER (OUTPATIENT)
Dept: LAB | Facility: HOSPITAL | Age: 39
End: 2024-03-18
Attending: OBSTETRICS & GYNECOLOGY
Payer: COMMERCIAL

## 2024-03-18 DIAGNOSIS — G40.909 SEIZURE DISORDER (HCC): ICD-10-CM

## 2024-03-18 DIAGNOSIS — Z3A.01 LESS THAN 8 WEEKS GESTATION OF PREGNANCY (HCC): ICD-10-CM

## 2024-03-18 DIAGNOSIS — O99.351 EPILEPSY AFFECTING PREGNANCY IN FIRST TRIMESTER (HCC): ICD-10-CM

## 2024-03-18 DIAGNOSIS — G40.909 EPILEPSY AFFECTING PREGNANCY IN FIRST TRIMESTER (HCC): ICD-10-CM

## 2024-03-18 LAB
ANTIBODY SCREEN: NEGATIVE
BASOPHILS # BLD AUTO: 0.02 X10(3) UL (ref 0–0.2)
BASOPHILS NFR BLD AUTO: 0.3 %
DEPRECATED RDW RBC AUTO: 40.6 FL (ref 35.1–46.3)
EOSINOPHIL # BLD AUTO: 0.04 X10(3) UL (ref 0–0.7)
EOSINOPHIL NFR BLD AUTO: 0.6 %
ERYTHROCYTE [DISTWIDTH] IN BLOOD BY AUTOMATED COUNT: 12.4 % (ref 11–15)
GLUCOSE 1H P GLC SERPL-MCNC: 136 MG/DL
HBV SURFACE AG SER-ACNC: <0.1 [IU]/L
HBV SURFACE AG SERPL QL IA: NONREACTIVE
HCT VFR BLD AUTO: 36.7 %
HCV AB SERPL QL IA: NONREACTIVE
HGB BLD-MCNC: 12.9 G/DL
IMM GRANULOCYTES # BLD AUTO: 0.03 X10(3) UL (ref 0–1)
IMM GRANULOCYTES NFR BLD: 0.5 %
LYMPHOCYTES # BLD AUTO: 1.25 X10(3) UL (ref 1–4)
LYMPHOCYTES NFR BLD AUTO: 19.1 %
MCH RBC QN AUTO: 31.9 PG (ref 26–34)
MCHC RBC AUTO-ENTMCNC: 35.1 G/DL (ref 31–37)
MCV RBC AUTO: 90.6 FL
MONOCYTES # BLD AUTO: 0.22 X10(3) UL (ref 0.1–1)
MONOCYTES NFR BLD AUTO: 3.4 %
NEUTROPHILS # BLD AUTO: 4.99 X10 (3) UL (ref 1.5–7.7)
NEUTROPHILS # BLD AUTO: 4.99 X10(3) UL (ref 1.5–7.7)
NEUTROPHILS NFR BLD AUTO: 76.1 %
PLATELET # BLD AUTO: 207 10(3)UL (ref 150–450)
RBC # BLD AUTO: 4.05 X10(6)UL
RH BLOOD TYPE: POSITIVE
RUBV IGG SER-ACNC: 9 IU/ML (ref 10–?)
T PALLIDUM AB SER QL IA: NONREACTIVE
WBC # BLD AUTO: 6.6 X10(3) UL (ref 4–11)

## 2024-03-18 PROCEDURE — 80177 DRUG SCRN QUAN LEVETIRACETAM: CPT | Performed by: OTHER

## 2024-03-18 RX ORDER — FOLIC ACID 1 MG/1
TABLET ORAL
Qty: 180 TABLET | Refills: 0 | Status: SHIPPED | OUTPATIENT
Start: 2024-03-18

## 2024-03-18 NOTE — TELEPHONE ENCOUNTER
Increased dose of Keppra  Received: 3 days ago  Abel Flores DO [facility] [place] Nurse  Hi,  Please let the patient know I increased her dose of Keppra from 750 twice daily to thousand twice daily.  This is because she had a breakthrough seizure in September on 750 twice daily.  I also know that she is pregnant and want to make sure she does not have seizures in the future.  I sent a new prescription to her pharmacy.  Kind regards,   Dr. Flores

## 2024-03-18 NOTE — TELEPHONE ENCOUNTER
From: Sveta Mckeon  To: Abel Flores  Sent: 3/16/2024 9:41 AM CDT  Subject: Keppra refill     Good morning I received a refill notice from my pharmacy that levetiracetam was ready, but I was told it was 1000 MG, I don't remember being told the dose was going up. On the papers given to me it says that levetiracetam 750 MG was sent to my pharmacy. Not sure if it was a mistake from your part, but I will prefer to stay on 750 MG.  Thank you.

## 2024-03-18 NOTE — TELEPHONE ENCOUNTER
Please see the [employer] message.  Reviewed and electronically signed by: VIKTORIYA Green

## 2024-03-18 NOTE — TELEPHONE ENCOUNTER
Disp Refills Start End    folic acid 1 MG Oral Tab 180 tablet 0 1/4/2024 --    Sig: Take one tablet by mouth twice daily.    Sent to pharmacy as: Folic Acid 1 MG Oral Tablet (Folvite)    E-Prescribing Status: Receipt confirmed by pharmacy (1/4/2024  2:03 PM CST)        Please review and sign if appropriate           LOV:3/15/2024   NOV: none    Last [place]: 1/04/2024 (3180 / 0 refills)

## 2024-03-18 NOTE — TELEPHONE ENCOUNTER
Increased dose of Keppra  Received: 3 days ago  Abel Flores DO [facility] [place] Nurse  Hi,  Please let the patient know I increased her dose of Keppra from 750 twice daily to thousand twice daily.  This is because she had a breakthrough seizure in September on 750 twice daily.  I also know that she is pregnant and want to make sure she does not have seizures in the future.  I sent a new prescription to her pharmacy.  Kind regards,   Dr. Flores

## 2024-03-19 ENCOUNTER — TELEPHONE (OUTPATIENT)
Dept: FAMILY MEDICINE CLINIC | Facility: CLINIC | Age: 39
End: 2024-03-19

## 2024-03-19 ENCOUNTER — TELEPHONE (OUTPATIENT)
Dept: OBGYN CLINIC | Facility: CLINIC | Age: 39
End: 2024-03-19

## 2024-03-19 NOTE — TELEPHONE ENCOUNTER
8w3d    Pt having issues with constipation, pt also having stomach pains with headaches and dizziness and problem sleeping.    Pls advise

## 2024-03-19 NOTE — TELEPHONE ENCOUNTER
Patient called, verified Name and . States she was seen in the office on 3/1. At that time, her dose of Keppra 750 mg was discussed, concerned being on that dose given that she is pregnant. She was referred to Neurology and saw Dr. Flores on 3/15. She is not aware that her seizure medication dose was increased to 1000 mg. She tried calling Neurology regarding this.    States she is more comfortable with the 750-mg dose and would like to be kept on that dose until she sees epileptologist, Dr. Palacios on . Patient was referred to a specialist for seizures by Dr. Flores. She wants to know if PCP will refill her Keppra 750 mg.     Advised to await Neurology's response. She will follow up in the office for further assistance if needed. Patient verbalized understanding and had no further questions at this time.

## 2024-03-19 NOTE — TELEPHONE ENCOUNTER
8w4d. Pt states she is noticing some cramping and constipation. States she feels like she has the need to use the BM, but only passing small amount of stool that is hard. Pt is not hydrating well, states water makes ure nauseated w/o vomiting. Pt has been eating oranges, with little help with the stool. Pt informed of Colace, MOM, prune juice, hydration and foods high in fiber. Informed if goes over 24 hr w/o BM, states to have N w/V to head to ER. Recs sent via . Pt states understanding.

## 2024-03-20 DIAGNOSIS — G40.909 SEIZURE DISORDER (HCC): ICD-10-CM

## 2024-03-20 LAB — LEVETIRACETAM LVL: 26.2 UG/ML

## 2024-03-20 RX ORDER — FOLIC ACID 1 MG/1
TABLET ORAL
Qty: 180 TABLET | Refills: 0 | Status: SHIPPED | OUTPATIENT
Start: 2024-03-20

## 2024-03-20 RX ORDER — FOLIC ACID 1 MG/1
TABLET ORAL
Qty: 180 TABLET | Refills: 1 | Status: CANCELLED | OUTPATIENT
Start: 2024-03-20

## 2024-03-20 NOTE — TELEPHONE ENCOUNTER
Requested Prescriptions     Pending Prescriptions Disp Refills    folic acid 1 MG Oral Tab 180 tablet 0     Sig: Take one tablet by mouth twice daily.        LOV: 3/15/24  NOV: none    Last [place]: 3/18/24

## 2024-03-21 ENCOUNTER — TELEPHONE (OUTPATIENT)
Dept: OBGYN CLINIC | Facility: CLINIC | Age: 39
End: 2024-03-21

## 2024-03-21 ENCOUNTER — INITIAL PRENATAL (OUTPATIENT)
Dept: OBGYN CLINIC | Facility: CLINIC | Age: 39
End: 2024-03-21
Payer: COMMERCIAL

## 2024-03-21 VITALS
WEIGHT: 106 LBS | BODY MASS INDEX: 18 KG/M2 | SYSTOLIC BLOOD PRESSURE: 100 MMHG | HEART RATE: 74 BPM | DIASTOLIC BLOOD PRESSURE: 63 MMHG

## 2024-03-21 DIAGNOSIS — O99.810 ABNORMAL MATERNAL GLUCOSE TOLERANCE, ANTEPARTUM (HCC): ICD-10-CM

## 2024-03-21 DIAGNOSIS — O09.521 AMA (ADVANCED MATERNAL AGE) MULTIGRAVIDA 35+, FIRST TRIMESTER (HCC): Primary | ICD-10-CM

## 2024-03-21 DIAGNOSIS — Z34.91 ENCOUNTER FOR SUPERVISION OF NORMAL PREGNANCY IN FIRST TRIMESTER, UNSPECIFIED GRAVIDITY (HCC): Primary | ICD-10-CM

## 2024-03-21 LAB
APPEARANCE: CLEAR
BILIRUBIN: NEGATIVE
GLUCOSE (URINE DIPSTICK): NEGATIVE MG/DL
KETONES (URINE DIPSTICK): NEGATIVE MG/DL
MULTISTIX LOT#: ABNORMAL NUMERIC
NITRITE, URINE: NEGATIVE
OCCULT BLOOD: NEGATIVE
PH, URINE: 7 (ref 4.5–8)
PROTEIN (URINE DIPSTICK): NEGATIVE MG/DL
SPECIFIC GRAVITY: 1.02 (ref 1–1.03)
URINE-COLOR: YELLOW
UROBILINOGEN,SEMI-QN: 0.2 MG/DL (ref 0–1.9)

## 2024-03-21 PROCEDURE — 3074F SYST BP LT 130 MM HG: CPT | Performed by: OBSTETRICS & GYNECOLOGY

## 2024-03-21 PROCEDURE — 3078F DIAST BP <80 MM HG: CPT | Performed by: OBSTETRICS & GYNECOLOGY

## 2024-03-21 PROCEDURE — 76801 OB US < 14 WKS SINGLE FETUS: CPT | Performed by: OBSTETRICS & GYNECOLOGY

## 2024-03-21 PROCEDURE — 81002 URINALYSIS NONAUTO W/O SCOPE: CPT | Performed by: OBSTETRICS & GYNECOLOGY

## 2024-03-21 NOTE — PROGRESS NOTES
New OB. Pap UTD; GC/C collected. Declines genetic screening. History of seizure on Keppra, last seizure 9/2023. Will need MFM consult and level 2 US. US SLIUP with size CWD. RTC 4 wks

## 2024-03-21 NOTE — TELEPHONE ENCOUNTER
Pt wishes / needs the following. Please do referral & send MFM order for:    [  ]  Genetic Screening    DX:  AMA, History of seizures on Keppra     [ x ]  medical consult    [  ]  16 wk cervical length    [ x ]  level 2 ultrasound    [   ] fetal echo    [  ]  growth ultrasound at 32 wks    [ x ]  serial ultrasound    [ x ]  NSTs once 36 weeks

## 2024-03-22 LAB
C TRACH DNA SPEC QL NAA+PROBE: NEGATIVE
N GONORRHOEA DNA SPEC QL NAA+PROBE: NEGATIVE
T VAGINALIS RRNA SPEC QL NAA+PROBE: NEGATIVE

## 2024-04-01 ENCOUNTER — TELEPHONE (OUTPATIENT)
Dept: OBGYN CLINIC | Facility: CLINIC | Age: 39
End: 2024-04-01

## 2024-04-01 RX ORDER — ONDANSETRON 4 MG/1
4 TABLET, FILM COATED ORAL EVERY 8 HOURS PRN
Qty: 30 TABLET | Refills: 2 | Status: SHIPPED | OUTPATIENT
Start: 2024-04-01

## 2024-04-01 NOTE — TELEPHONE ENCOUNTER
Pt is 10w3d, reports nausea and vomiting 5 or more times per day. Able to keep down some fluids.   Reports sometimes she feels SOB, dizzy and hot flashes. States symptoms do not last long.   Also reports constipation. Last BM was 2 days ago. Normal BM for her is 1-2 times per day.   Also states she it is hard to get out of bed and take care of her children. States she feels anxious, desperate and will start crying. Denies depression. Denies suicidal and homicidal ideation. Also denies hx of anxiety and depression. Offered  referral. Pt declines at this time.   Advised pt that hormones fluctuate more during pregnancy and can trigger anxiety and depression. Welcomed pt to monitor for now and call back if she would like  referral.   Advised since she is not feeling well physically, this can also trigger anxiety. Informed pt message will be routed to md on call to ask if pt can have anti-nausea rx.   Also advised on interventions to prevent constipation and taking colace 1-2 caps per day.      Message to ZULEIMA on call to ask if pt can have rx for anti-nausea med?

## 2024-04-01 NOTE — TELEPHONE ENCOUNTER
Notified pt of rx below per ZULEIMA. Advised to call back if no improvement within a few days. Pt agrees.

## 2024-04-01 NOTE — TELEPHONE ENCOUNTER
Pt stated she feels nauseous, is vomiting and has constipation. Wanted to know if there is anything she can take for constipation. Pt feels like she is starting to feel anxious; not sure if it is a anxiety/panic attack. Can't take care of her kids. Please call.

## 2024-04-07 ENCOUNTER — PATIENT MESSAGE (OUTPATIENT)
Dept: OBGYN CLINIC | Facility: CLINIC | Age: 39
End: 2024-04-07

## 2024-04-08 NOTE — TELEPHONE ENCOUNTER
From: Sveta Mckeon  To: Serena Villegas  Sent: 4/7/2024 6:45 PM CDT  Subject: Constipation     Hi I was wondering if it's ok for me to take this fiber to help with my constipation. Nothing has been really working. Not even when going up two tablets of the Colace.

## 2024-04-17 ENCOUNTER — ROUTINE PRENATAL (OUTPATIENT)
Dept: OBGYN CLINIC | Facility: CLINIC | Age: 39
End: 2024-04-17
Payer: COMMERCIAL

## 2024-04-17 VITALS
WEIGHT: 105.81 LBS | HEART RATE: 76 BPM | SYSTOLIC BLOOD PRESSURE: 99 MMHG | DIASTOLIC BLOOD PRESSURE: 63 MMHG | BODY MASS INDEX: 18 KG/M2

## 2024-04-17 DIAGNOSIS — Z34.91 ENCOUNTER FOR SUPERVISION OF NORMAL PREGNANCY IN FIRST TRIMESTER, UNSPECIFIED GRAVIDITY (HCC): Primary | ICD-10-CM

## 2024-04-17 LAB
APPEARANCE: CLEAR
BILIRUBIN: NEGATIVE
GLUCOSE (URINE DIPSTICK): NEGATIVE MG/DL
KETONES (URINE DIPSTICK): NEGATIVE MG/DL
LEUKOCYTES: NEGATIVE
MULTISTIX LOT#: NORMAL NUMERIC
NITRITE, URINE: NEGATIVE
OCCULT BLOOD: NEGATIVE
PH, URINE: 7.5 (ref 4.5–8)
PROTEIN (URINE DIPSTICK): NEGATIVE MG/DL
SPECIFIC GRAVITY: 1 (ref 1–1.03)
URINE-COLOR: YELLOW
UROBILINOGEN,SEMI-QN: 0.2 MG/DL (ref 0–1.9)

## 2024-04-17 PROCEDURE — 3074F SYST BP LT 130 MM HG: CPT | Performed by: OBSTETRICS & GYNECOLOGY

## 2024-04-17 PROCEDURE — 3078F DIAST BP <80 MM HG: CPT | Performed by: OBSTETRICS & GYNECOLOGY

## 2024-04-17 PROCEDURE — 81002 URINALYSIS NONAUTO W/O SCOPE: CPT | Performed by: OBSTETRICS & GYNECOLOGY

## 2024-04-17 RX ORDER — PRENATAL WITH FERROUS FUM AND FOLIC ACID 3080; 920; 120; 400; 22; 1.84; 3; 20; 10; 1; 12; 200; 27; 25; 2 [IU]/1; [IU]/1; MG/1; [IU]/1; MG/1; MG/1; MG/1; MG/1; MG/1; MG/1; UG/1; MG/1; MG/1; MG/1; MG/1
TABLET ORAL
COMMUNITY
Start: 2024-04-16

## 2024-04-19 ENCOUNTER — PATIENT MESSAGE (OUTPATIENT)
Dept: OBGYN CLINIC | Facility: CLINIC | Age: 39
End: 2024-04-19

## 2024-04-19 NOTE — TELEPHONE ENCOUNTER
Approved and I want her to discuss at NPN visit as well so we can see how large of an area she is using it on.

## 2024-04-19 NOTE — TELEPHONE ENCOUNTER
From: Sveta Mckeon  To: Toyin Nguyễn  Sent: 4/19/2024 10:34 AM CDT  Subject: Oitment usage    Hello,   On my appointment with Dr. Nguyễn, I forgot to ask if the oitment I was using for the skin rash was safe to keep using during pregnancy.

## 2024-04-19 NOTE — TELEPHONE ENCOUNTER
13w0d pt with rash to back of thighs, suspects eczema. Hx of dermatitis. Has rx for mometasone 0.1% oint from Dr. Alvarado, Derm.    Pt asking if ok to use in pregnancy. Cat C.     To ZULEIMA to advise.

## 2024-04-22 ENCOUNTER — LABORATORY ENCOUNTER (OUTPATIENT)
Dept: LAB | Facility: HOSPITAL | Age: 39
End: 2024-04-22
Attending: FAMILY MEDICINE
Payer: COMMERCIAL

## 2024-04-22 DIAGNOSIS — O99.810 ABNORMAL MATERNAL GLUCOSE TOLERANCE, ANTEPARTUM (HCC): ICD-10-CM

## 2024-04-22 DIAGNOSIS — G40.909 SEIZURE DISORDER (HCC): ICD-10-CM

## 2024-04-22 LAB
GLUCOSE 1H P GLC SERPL-MCNC: 159 MG/DL
GLUCOSE 2H P GLC SERPL-MCNC: 121 MG/DL
GLUCOSE 3H P GLC SERPL-MCNC: 94 MG/DL (ref 70–140)
GLUCOSE P FAST SERPL-MCNC: 77 MG/DL

## 2024-04-22 PROCEDURE — 82951 GLUCOSE TOLERANCE TEST (GTT): CPT

## 2024-04-22 PROCEDURE — 36415 COLL VENOUS BLD VENIPUNCTURE: CPT

## 2024-04-22 PROCEDURE — 82952 GTT-ADDED SAMPLES: CPT

## 2024-04-22 RX ORDER — LEVETIRACETAM 750 MG/1
750 TABLET ORAL 2 TIMES DAILY
Qty: 180 TABLET | Refills: 0 | OUTPATIENT
Start: 2024-04-22

## 2024-04-22 NOTE — TELEPHONE ENCOUNTER
Requested Prescriptions     Pending Prescriptions Disp Refills    LEVETIRACETAM 750 MG Oral Tab [Pharmacy Med Name: Levetiracetam 750 Mg Tab Nort] 180 tablet 0     Sig: TAKE ONE TABLET BY MOUTH TWICE DAILY       Last OV: 3/15/24  Next OV: 5/13/24  Last refilled: 3/15/24 #180/3 refills  Refills are on file.  Reviewed and electronically signed by: VIKTORIYA Green

## 2024-04-25 ENCOUNTER — TELEPHONE (OUTPATIENT)
Dept: FAMILY MEDICINE CLINIC | Facility: CLINIC | Age: 39
End: 2024-04-25

## 2024-04-25 DIAGNOSIS — G40.909 SEIZURE DISORDER (HCC): ICD-10-CM

## 2024-04-25 DIAGNOSIS — G40.909 SEIZURE DISORDER (HCC): Primary | ICD-10-CM

## 2024-04-25 RX ORDER — LEVETIRACETAM 750 MG/1
750 TABLET ORAL 2 TIMES DAILY
Qty: 60 TABLET | Refills: 0 | Status: SHIPPED | OUTPATIENT
Start: 2024-04-25 | End: 2024-05-25

## 2024-04-25 RX ORDER — NAPROXEN 500 MG/1
500 TABLET ORAL 2 TIMES DAILY WITH MEALS
Qty: 60 TABLET | Refills: 0 | Status: SHIPPED | OUTPATIENT
Start: 2024-04-25 | End: 2024-04-25

## 2024-04-25 RX ORDER — LEVETIRACETAM 750 MG/1
750 TABLET ORAL 2 TIMES DAILY
Qty: 180 TABLET | Refills: 0 | OUTPATIENT
Start: 2024-04-25

## 2024-04-25 NOTE — TELEPHONE ENCOUNTER
Patient notified of short term supply sent to pharmacy, must get refill from new neurologist.  Stated understanding.

## 2024-04-25 NOTE — TELEPHONE ENCOUNTER
Dr. Vazquez --- would you be able to send a temporary refill for patient?   Levetiracetam 750mg Oral 2 times daily.   Issues with DR. Flores - not respecting patient's concerns.   Has NEW Neuro doctor scheduled 5/13/24.       Patient called office. Date of birth and full name both confirmed.   Dr. Ramachandran office declined refill     But she will be running out soon. But has enough for now.     As noted in previous encounters from March 2024, per patient, Dr. Flores does not listen to her needs. And he wanted to increase her dose to 1000mg twice daily, and per patient, he said that she wanted the increase, but she did not say this at all, per patient.     And has appointment with NEW Neuro coming up 5/13/24 Dr Palacios    Asking for temporary supply until 5/13/24 Dr. Hidalgo Appointment Neurology    Confirms current dose is levetiracetam 750mg Oral 2 times daily.     Future Appointments   Date Time Provider Department Center   5/13/2024 12:00 PM Earline Palacios MD [facility]   5/15/2024  9:50 AM Radhika Garnica MD [facility]   6/7/2024  2:00 PM [facility] RM[facility] FETAL EM [place]   6/12/2024 10:00 AM Trell Shelley, DO [facility]   7/10/2024 12:30 PM Jose Nash, DO [facility]   8/14/2024  9:00 AM Lidia Joya MD [facility]

## 2024-04-25 NOTE — TELEPHONE ENCOUNTER
Requested Prescriptions     Pending Prescriptions Disp Refills    LEVETIRACETAM 750 MG Oral Tab [Pharmacy Med Name: Levetiracetam 750 Mg Tab Nort] 180 tablet 0     Sig: TAKE ONE TABLET BY MOUTH TWICE DAILY       Last OV: 3/15/24  Next OV: None - 5/13/24 with Dr. Palacios  Last refilled: Today by the PCP #60 - which should allow the patient coverage until seen by the epileptologist, Dr. Palacios on 5/13/24. After seen by Dr. Palacios, he can determine the dose the patient is to proceed as Dr. Flores wanted the patient to take 1000mg bid due to a breakthrough seizure.The patient preferred to stay on 750mg bid instead.  Reviewed and electronically signed by: VIKTORIYA Green

## 2024-04-25 NOTE — TELEPHONE ENCOUNTER
Seizure disorder (HCC)  - levetiracetam 750 MG Oral Tab; Take 1 tablet (750 mg total) by mouth 2 (two) times daily.  Dispense: 60 tablet; Refill: 0  NEURO TO REFILL

## 2024-05-13 ENCOUNTER — OFFICE VISIT (OUTPATIENT)
Facility: CLINIC | Age: 39
End: 2024-05-13
Payer: COMMERCIAL

## 2024-05-13 ENCOUNTER — LAB ENCOUNTER (OUTPATIENT)
Dept: LAB | Age: 39
End: 2024-05-13
Attending: Other

## 2024-05-13 VITALS
WEIGHT: 108 LBS | BODY MASS INDEX: 19 KG/M2 | RESPIRATION RATE: 16 BRPM | DIASTOLIC BLOOD PRESSURE: 66 MMHG | SYSTOLIC BLOOD PRESSURE: 120 MMHG | HEART RATE: 94 BPM

## 2024-05-13 DIAGNOSIS — Z79.899 ENCOUNTER FOR LONG-TERM (CURRENT) DRUG USE: ICD-10-CM

## 2024-05-13 DIAGNOSIS — G40.909 NONINTRACTABLE EPILEPSY WITHOUT STATUS EPILEPTICUS, UNSPECIFIED EPILEPSY TYPE (HCC): Primary | ICD-10-CM

## 2024-05-13 PROCEDURE — 3078F DIAST BP <80 MM HG: CPT | Performed by: OTHER

## 2024-05-13 PROCEDURE — 80177 DRUG SCRN QUAN LEVETIRACETAM: CPT | Performed by: OTHER

## 2024-05-13 PROCEDURE — 99215 OFFICE O/P EST HI 40 MIN: CPT | Performed by: OTHER

## 2024-05-13 PROCEDURE — 3074F SYST BP LT 130 MM HG: CPT | Performed by: OTHER

## 2024-05-13 NOTE — PATIENT INSTRUCTIONS
Refill policies:    Allow 2-3 business days for refills; controlled substances may take longer.  Contact your pharmacy at least 5 days prior to running out of medication and have them send an electronic request or submit request through the “request refill” option in your [employer] account.  Refills are not addressed on weekends; covering physicians do not authorize routine medications on weekends.  No narcotics or controlled substances are refilled after noon on Fridays or by on call physicians.  By law, narcotics must be electronically prescribed.  A 30 day supply with no refills is the maximum allowed.  If your prescription is due for a refill, you may be due for a follow up appointment.  To best provide you care, patients receiving routine medications need to be seen at least once a year.  Patients receiving narcotic/controlled substance medications need to be seen at least once every 3 months.  In the event that your preferred pharmacy does not have the requested medication in stock (e.g. Backordered), it is your responsibility to find another pharmacy that has the requested medication available.  We will gladly send a new prescription to that pharmacy at your request.    Scheduling Tests:    If your physician has ordered radiology tests such as MRI or CT scans, please contact Central Scheduling at [phone] right away to schedule the test.  Once scheduled, the [facility] Centralized Referral Team will work with your insurance carrier to obtain pre-certification or prior authorization.  Depending on your insurance carrier, approval may take 3-10 days.  It is highly recommended patients assure they have received an authorization before having a test performed.  If test is done without insurance authorization, patient may be responsible for the entire amount billed.      Precertification and Prior Authorizations:  If your physician has recommended that you have a procedure or additional testing performed the [facility]  Centralized Referral Team will contact your insurance carrier to obtain pre-certification or prior authorization.    You are strongly encouraged to contact your insurance carrier to verify that your procedure/test has been approved and is a COVERED benefit.  Although the [facility] Centralized Referral Team does its due diligence, the insurance carrier gives the disclaimer that \"Although the procedure is authorized, this does not guarantee payment.\"    Ultimately the patient is responsible for payment.   Thank you for your understanding in this matter.  Paperwork Completion:  If you require FMLA or disability paperwork for your recovery, please make sure to either drop it off or have it faxed to our office at [phone]. Be sure the form has your name and date of birth on it.  The form will be faxed to our Forms Department and they will complete it for you.  There is a 25$ fee for all forms that need to be filled out.  Please be aware there is a 10-14 day turnaround time.  You will need to sign a release of information (ROSITA) form if your paperwork does not come with one.  You may call the Forms Department with any questions at [phone].  Their fax number is [phone].

## 2024-05-13 NOTE — PROGRESS NOTES
Neurology Return History & Physical     ASSESSMENT & PLAN:      ICD-10-CM    1. Nonintractable epilepsy without status epilepticus, unspecified epilepsy type ([facility])  G40.909         Epilepsy, unspecified, possibly generalized.  Well controlled but 15 weeks pregnant and thus high risk and needs intensive monitoring.  Continue  mg BID, and FA 1 mg daily.  Last 2 seizures were triggered by sleep deprivation, patient preferred not to increase LEV as recommended by Dr. Flores, I advised this is reasonable as she is willing to accept the risk (which she accepted).  However, in future LEV may need to be increased, since level may drop eleanor by 3rd trimester.  Check monthly levels.  Discussed pregnancy related issues and importance of taking medication and trying to get enough sleep, incl post-partum.    We discussed medication side effects and activity precautions. We discussed symptoms that would warrant urgent/emergent evaluation. Patient verbalized understanding and agreement.    Total time I spent caring for the patient was 45 minutes on the day of the encounter related to this visit, including chart review and documentation before and after the visit, including time spent during the visit, but not including separately reported activities or procedures.    Return in about 2 months (around 7/13/2024).       ~~~~~~~~~~~~~~~~~~~~~~~~~~~    CHIEF COMPLAINT / REASON FOR VISIT:    Chief Complaint   Patient presents with    Neurologic Problem    Seizures     Denies recent seizures or seizure like activity.      HISTORY OBTAINED FROM:  Patient and others as above  Chart review    HISTORY OF PRESENT ILLNESS:  Sveta Mckeon is a 39 year old female with seizures since age 16, exclusively nocturnal convulsions, and also staring event (this only happened initially)  Last seizures Jan and Sept 2023 (convulsions both triggered by sleep deprivation).  Have been triggered by sleep deprivation and post-partum in past.    INTERIM  HISTORY:  Continues on  mg BID, initially felt insomnia and anxiety, but that resolved.  Otherwise no further issues, no seizures or staring events.  Is 15 weeks pregnant, uncomplicated so far.    Driving status:  Driving    Previous medication trials:  LCM - nightmares  CBZ for many years    DATA REVIEWED:  As documented in the HPI    2024  LEV 26.2  Neuro note (Mark) - apparently did have abnormal EEG in 2015  EEG unremarkable    2023  CBZ 4.5    2021  Head CT unremarkable       PHYSICAL EXAMINATION:  /66   Pulse 94   Resp 16   Wt 108 lb (49 kg)   LMP 2024   BMI 18.54 kg/m²     Gen: WDWN, in NAD  MSE: nl attn/conc, nl language, nl fund of knowledge  CN: EOMI, VFF, nl facial mvmt, nl palate, nl tongue  Motor: 5/5 x4  DTR: 2+ BUE and BLE  Coord: nl FTN b/I  Gait: normal    No Known Allergies    Current medications:   levetiracetam 750 MG Oral Tab Take 1 tablet (750 mg total) by mouth 2 (two) times daily. 60 tablet 0    Prenatal 27-1 MG Oral Tab       folic acid 1 MG Oral Tab Take one tablet by mouth twice daily.Take one tablet by mouth twice daily. 180 tablet 0    mometasone 0.1 % External Ointment Apply 1 Application topically daily. 30 g 1       Past Medical History:    Environmental allergies    History of pregnancy        Seasonal allergies    Seizure (HCC)    Seizure disorder (HCC)    last seizure 2015       Past Surgical History:   Procedure Laterality Date    Hernia surgery  2016    Umbilical          Ventral hernia repair  3/18/2016    (Supraumbilical/umbilical)       Social History     Socioeconomic History    Marital status:     Number of children: 3   Occupational History    Occupation: dental assistant   Tobacco Use    Smoking status: Never     Passive exposure: Never    Smokeless tobacco: Never   Vaping Use    Vaping status: Never Used   Substance and Sexual Activity    Alcohol use: No     Alcohol/week: 0.0 standard drinks of alcohol     Drug use: No   Other Topics Concern    Caffeine Concern No    Exercise No    Reaction to local anesthetic No       Family History   Problem Relation Age of Onset    Diabetes Maternal Grandmother     Pancreatic Cancer Paternal Grandmother     Cancer Paternal Grandmother         unknown type         Earline Palacios MD, FAES, FAAN  Board-Certified in Neurology, Epilepsy, and Clinical Neurophysiology  [facility]

## 2024-05-15 ENCOUNTER — PATIENT MESSAGE (OUTPATIENT)
Dept: OBGYN CLINIC | Facility: CLINIC | Age: 39
End: 2024-05-15

## 2024-05-15 ENCOUNTER — ROUTINE PRENATAL (OUTPATIENT)
Dept: OBGYN CLINIC | Facility: CLINIC | Age: 39
End: 2024-05-15

## 2024-05-15 ENCOUNTER — TELEPHONE (OUTPATIENT)
Dept: NEUROLOGY | Facility: CLINIC | Age: 39
End: 2024-05-15

## 2024-05-15 VITALS
DIASTOLIC BLOOD PRESSURE: 63 MMHG | WEIGHT: 110.38 LBS | SYSTOLIC BLOOD PRESSURE: 95 MMHG | BODY MASS INDEX: 19 KG/M2 | HEART RATE: 66 BPM

## 2024-05-15 DIAGNOSIS — G40.909 NONINTRACTABLE EPILEPSY WITHOUT STATUS EPILEPTICUS, UNSPECIFIED EPILEPSY TYPE (HCC): Primary | ICD-10-CM

## 2024-05-15 DIAGNOSIS — Z34.93 ENCOUNTER FOR SUPERVISION OF NORMAL PREGNANCY IN THIRD TRIMESTER, UNSPECIFIED GRAVIDITY (HCC): Primary | ICD-10-CM

## 2024-05-15 PROBLEM — M85.88 OSTEOPENIA OF LUMBAR SPINE: Status: RESOLVED | Noted: 2022-04-05 | Resolved: 2024-05-15

## 2024-05-15 PROBLEM — E78.00 HYPERCHOLESTEREMIA: Status: RESOLVED | Noted: 2022-04-04 | Resolved: 2024-05-15

## 2024-05-15 LAB
BILIRUBIN: NEGATIVE
GLUCOSE (URINE DIPSTICK): NEGATIVE MG/DL
KETONES (URINE DIPSTICK): NEGATIVE MG/DL
LEUKOCYTES: NEGATIVE
LEVETIRACETAM LVL: 21.5 UG/ML
MULTISTIX LOT#: NORMAL NUMERIC
NITRITE, URINE: NEGATIVE
OCCULT BLOOD: NEGATIVE
PH, URINE: 8 (ref 4.5–8)
PROTEIN (URINE DIPSTICK): NEGATIVE MG/DL
SPECIFIC GRAVITY: 1.01 (ref 1–1.03)
UROBILINOGEN,SEMI-QN: 0.2 MG/DL (ref 0–1.9)

## 2024-05-15 PROCEDURE — 3078F DIAST BP <80 MM HG: CPT | Performed by: OBSTETRICS & GYNECOLOGY

## 2024-05-15 PROCEDURE — 3074F SYST BP LT 130 MM HG: CPT | Performed by: OBSTETRICS & GYNECOLOGY

## 2024-05-15 PROCEDURE — 81002 URINALYSIS NONAUTO W/O SCOPE: CPT | Performed by: OBSTETRICS & GYNECOLOGY

## 2024-05-15 RX ORDER — LEVETIRACETAM 250 MG/1
TABLET ORAL
Qty: 7 TABLET | Refills: 0 | Status: SHIPPED | OUTPATIENT
Start: 2024-05-15

## 2024-05-15 RX ORDER — LEVETIRACETAM 1000 MG/1
1000 TABLET ORAL 2 TIMES DAILY
Qty: 60 TABLET | Refills: 1 | Status: SHIPPED | OUTPATIENT
Start: 2024-05-15

## 2024-05-15 NOTE — TELEPHONE ENCOUNTER
From: Sveta Mckeon  To: Toyin MACKAY Page  Sent: 5/15/2024 2:42 PM CDT  Subject: Kemary    Good afternoon!  The new neurologist I saw, decided, after having blood work done to check the levels of my seizure medication, to start going on a higher dose. He explained to me regarding this medication being more safe during pregnancy and just trying to keep the same levels during my pregnancy. My question is, even though it's safe during pregnancy going on a higher dose, will this affect the baby, does baby intake any percentage of the medication? This is my first time during my previous pregnancies, going on a higher dose on my seizure medication while pregnant. This is what brings me to wonder if it won't affect the baby.

## 2024-05-15 NOTE — TELEPHONE ENCOUNTER
Discussed the rationale for the increasing the Keppra dose and that a dropping Keppra level increases the risk of seizure which is more harmful to the baby than the medication. Also informed patient that Keppra is one of the safest seizure medications during pregnancy. Patient verbalized understanding but states she is just concerned that the increased Keppra dosage would reach her baby through the placenta. Patient states she will discuss this with her Obstetrician.

## 2024-05-15 NOTE — TELEPHONE ENCOUNTER
Patient notified of Keppra level dropping and Dr Palacios's recommendation to increase Levetiracetam dosage.   2 prescriptions sent to [facility] in [place] per written order.    ----- Message from Earline Palacios sent at 5/15/2024 10:03 AM CDT -----  Hello, can we let this patient know that her Keppra level is starting to drop, as expected in pregnancy.  I recommend increasing Keppra to 750 mg in morning and 1000 mg in evening for 1 week, then 1000 mg twice a day.     Thanks     Earline

## 2024-05-15 NOTE — TELEPHONE ENCOUNTER
Patient is requesting to speak with clinical staff in regards to having questions on going on a higher dose of Keppra while being pregnant.     Patient states that she never has had to go on a higher dose while pregnant. She would also like to know how it will affect the baby.

## 2024-05-16 ENCOUNTER — PATIENT MESSAGE (OUTPATIENT)
Facility: CLINIC | Age: 39
End: 2024-05-16

## 2024-05-16 ENCOUNTER — TELEPHONE (OUTPATIENT)
Dept: OBGYN CLINIC | Facility: CLINIC | Age: 39
End: 2024-05-16

## 2024-05-16 NOTE — TELEPHONE ENCOUNTER
Patient calling regarding the Keppra medication. Patient has questions about the medication. See patient message yesterday

## 2024-05-16 NOTE — TELEPHONE ENCOUNTER
16w6d patient on keppra for seizure disorder.  See 5/16/24 neurology notes. Patient told to increase from 750 mg twice daily to 1000 mg twice daily due to decreasing keppra level.     Patient is questioning increase since keppra level is still in normal range. She states she has never had to increase with previous pregnancies. She states she is not comfortable.   Patient asking if okay to wait 1 month for next keppra level to decide if she truly needs to increase dose.    Discussed that recommendations are to follow directions per Neuro. ObGyn cannot decide whether it is safe or appropriate to wait for next blood draw. Patient verbalized understanding and will reach out to Neuro to discuss.    Patient is asking for additional information regarding effect of Keppra on pregnancies. Advised category C for safety. Patient asking for on-call to review and provide additional information.    To Dr. Shelley to advise.

## 2024-05-20 NOTE — TELEPHONE ENCOUNTER
Pt reports she started taking the higher dosage of Kepra on Friday. The first day threw up, she is experiencing a lot of headaches and cannot sleep through the night. She is taking 750 mg morning and 1000 mg at night. Pt's best cb # is [phone]. Transferring call to RN.

## 2024-05-20 NOTE — TELEPHONE ENCOUNTER
Patient given Dr Palacios's recommendation to try taking Keppra 750mg in am and 875mg nightly for 1 week, and then increase to 750mg in am and 1000mg nightly.  Patient states she would like to go back to 750mg bid if symptoms don't improved with decreasing night dosage to 875mg. Advised patient call on Thursday, 5/23 to give Dr Palacios an update.

## 2024-05-20 NOTE — TELEPHONE ENCOUNTER
Spoke with pt, who says headaches and inability to sleep began within a minute of taking the increased dose of Keppra on Friday.     Headache is located across the forehead and temples.    Rates H/A at 9/10.     Headaches intensity decreases as the day progresses, but then the headache resumes as soon as she take Keppra.    She is very concerned about potential for seizures since they tend to increase when she can't sleep, and she is unable to sleep due to headaches.

## 2024-05-23 ENCOUNTER — TELEPHONE (OUTPATIENT)
Dept: FAMILY MEDICINE CLINIC | Facility: CLINIC | Age: 39
End: 2024-05-23

## 2024-05-23 NOTE — TELEPHONE ENCOUNTER
Dr. Vazquez, patient has had headache and lack of sleep from recently increased dose of Keppra. Has had trouble getting in touch with neurology provider, would like primary care provider to review concerns and advise while she waits to hear back from neurology. Video visit scheduled for tomorrow, patient would like to know if ok to just take Keppra 750 mg twice daily. Thank you.  Future Appointments   Date Time Provider Department Center   2024 12:45 PM [facility]     Spoke to patient (name and  of patient verified).   24 saw new neurologist.  After test results, provider advised increasing dose of Keppra as levels trended down from March and she is pregnant  Plan to increase Keppra dose from 750 mg twice a day to 1,000 mg twice per day.    Added 250 mg at night last Friday as advised  Since then, has felt nauseous, started throwing up, has had headaches, not sleeping well which is how she felt when starting Keppra.  Called neurology who advised taking 1/2 of a 250 mg tablet which she did Monday, took another yesterday evening   Prefers to stay on 750 mg twice daily.  Concerned lack of sleep and increased stress may trigger seizure  Feels neurology provider did not review history and trends before adjusting medication.  Patient would like more testing before adjusting medication reports numbers always fluctuate, but remain within range. Video Visit scheduled with Dr. Vazquez

## 2024-05-23 NOTE — TELEPHONE ENCOUNTER
Patient contacted (name and  of patient verified). Dr. Vazquez's message reviewed. Patient verbalizes understanding and is comfortable cancelling appointment for tomorrow, appointment cancelled.  Patient reports she just spoke to nurse from neurology. The neurologist covering for Dr. Palacios had the same recommendation as Dr. Vazquez.  Patient states she will take Keppra 750 mg twice daily and follow up with Dr. Palacios when he returns.

## 2024-05-23 NOTE — TELEPHONE ENCOUNTER
Ok to go back to 750 mg bid until she speaks with neurology. Can cancel visit unless she has further qs

## 2024-05-24 DIAGNOSIS — G40.909 SEIZURE DISORDER (HCC): ICD-10-CM

## 2024-05-24 RX ORDER — FOLIC ACID 1 MG/1
TABLET ORAL
Qty: 180 TABLET | Refills: 0 | Status: SHIPPED | OUTPATIENT
Start: 2024-05-24

## 2024-05-24 NOTE — TELEPHONE ENCOUNTER
Requested Prescriptions     Pending Prescriptions Disp Refills    folic acid 1 MG Oral Tab 180 tablet 0     Sig: Take one tablet by mouth twice daily.Take one tablet by mouth twice daily.        LOV: 3/15/2024  NOV: none    [place] 3[place]20/24

## 2024-05-31 ENCOUNTER — PATIENT MESSAGE (OUTPATIENT)
Facility: CLINIC | Age: 39
End: 2024-05-31

## 2024-05-31 DIAGNOSIS — G40.909 NONINTRACTABLE EPILEPSY WITHOUT STATUS EPILEPTICUS, UNSPECIFIED EPILEPSY TYPE (HCC): ICD-10-CM

## 2024-05-31 RX ORDER — LEVETIRACETAM 750 MG/1
750 TABLET ORAL 2 TIMES DAILY
Qty: 60 TABLET | Refills: 1 | Status: SHIPPED | OUTPATIENT
Start: 2024-05-31

## 2024-05-31 NOTE — TELEPHONE ENCOUNTER
Medication: Levetiracetam     Date of last refill: 4/25/24 (#60/0)  [place]      Last office visit: 5/13/2024  Due back to clinic per last office note:  2 months  Date next office visit scheduled:    Future Appointments   Date Time Provider Department Center   6/7/2024  2:00 PM [facility] RM1 [facility] FETAL EM [place]   6/12/2024 10:00 AM Trell Shelley, DO [facility]   7/10/2024 12:30 PM Jose Nash,  [facility]   7/22/2024 10:30 AM Earline Palacios MD [facility] [place]   8/14/2024  9:00 AM Lidia Joya MD [facility]           Last OV note recommendation:    Epilepsy, unspecified, possibly generalized.  Well controlled but 15 weeks pregnant and thus high risk and needs intensive monitoring.  Continue  mg BID, and FA 1 mg daily.  Last 2 seizures were triggered by sleep deprivation, patient preferred not to increase LEV as recommended by Dr. Flores, I advised this is reasonable as she is willing to accept the risk (which she accepted).  However, in future LEV may need to be increased, since level may drop eleanor by 3rd trimester.  Check monthly levels.  Discussed pregnancy related issues and importance of taking medication and trying to get enough sleep, incl post-partum.    Return in about 2 months (around 7/13/2024).

## 2024-05-31 NOTE — TELEPHONE ENCOUNTER
From: Sveta Mckeon  To: Earline Palacios  Sent: 5/31/2024 8:08 AM CDT  Subject: Keppra 750 refill    Good morning! I was trying to send a refill notice, but didn't know how to send it directly to Dr. Palacios. I need refills on Keppra 750mg and the folic acid. I had asked through [facility] on the folic acid, but not sure if it went to him or the previous dr. Thank you!

## 2024-06-07 ENCOUNTER — HOSPITAL ENCOUNTER (OUTPATIENT)
Dept: PERINATAL CARE | Facility: HOSPITAL | Age: 39
Discharge: HOME | End: 2024-06-07
Attending: OBSTETRICS & GYNECOLOGY
Payer: COMMERCIAL

## 2024-06-07 VITALS
BODY MASS INDEX: 20 KG/M2 | DIASTOLIC BLOOD PRESSURE: 58 MMHG | SYSTOLIC BLOOD PRESSURE: 93 MMHG | HEART RATE: 90 BPM | WEIGHT: 115 LBS

## 2024-06-07 DIAGNOSIS — O99.810 ABNORMAL MATERNAL GLUCOSE TOLERANCE, ANTEPARTUM (HCC): ICD-10-CM

## 2024-06-07 DIAGNOSIS — O09.522 AMA (ADVANCED MATERNAL AGE) MULTIGRAVIDA 35+, SECOND TRIMESTER (HCC): Primary | ICD-10-CM

## 2024-06-07 DIAGNOSIS — Z36.89 ENCOUNTER FOR FETAL ANATOMIC SURVEY (HCC): ICD-10-CM

## 2024-06-07 DIAGNOSIS — O09.522 AMA (ADVANCED MATERNAL AGE) MULTIGRAVIDA 35+, SECOND TRIMESTER (HCC): ICD-10-CM

## 2024-06-07 DIAGNOSIS — G40.909 SEIZURE DISORDER (HCC): ICD-10-CM

## 2024-06-07 PROCEDURE — 76811 OB US DETAILED SNGL FETUS: CPT | Performed by: OBSTETRICS & GYNECOLOGY

## 2024-06-07 NOTE — PROGRESS NOTES
Outpatient Maternal-Fetal Medicine Consultation    Dear Dr. Villegas    Thank you for requesting ultrasound evaluation and maternal fetal medicine consultation on your patient Sveta Mckeon.  As you are aware she is a 39 year old female  with a sood pregnancy and an Estimated Date of Delivery: 10/25/24.  A maternal-fetal medicine consultation was requested secondary to Advanced Maternal Age.  Her prenatal records and labs were reviewed.    In prior pregnancies, she was managed on carbamazepine.  None of her children have any abnormalities.  None of her children have a seizure disorder at this time.  She was switched to Keppra a few years ago and has been well-controlled.  ROS    HISTORY  OB History    Para Term  AB Living   5 4 4     4   SAB IAB Ectopic Multiple Live Births         0 4      # Outcome Date GA Lbr Gilberto/2nd Weight Sex Type Anes PTL Lv   5 Current            4 Term 17 41w2d 05:10 / 00:22 10 lb 10.6 oz (4.835 kg) F NORMAL SPONT Local  MATILDE   3 Term 03/15/15 40w4d  9 lb 14 oz (4.479 kg) M NORMAL SPONT   MATILDE   2 Term 12 40w1d  8 lb 3 oz (3.714 kg) F NORMAL SPONT None N MATILDE   1 Term 09 40w3d  8 lb 8 oz (3.856 kg) F NORMAL SPONT None N MATILDE       Allergies:  No Known Allergies   Current Meds:  Current Outpatient Medications   Medication Sig Dispense Refill    levetiracetam 750 MG Oral Tab Take 1 tablet (750 mg total) by mouth 2 (two) times daily. 60 tablet 1    folic acid 1 MG Oral Tab Take one tablet by mouth twice daily. 180 tablet 0    Prenatal 27-1 MG Oral Tab       mometasone 0.1 % External Ointment Apply 1 Application topically daily. 30 g 1        HISTORY:  Past Medical History:    Environmental allergies    History of pregnancy        Seasonal allergies    Seizure disorder (HCC)    last seizure 2015      Past Surgical History:   Procedure Laterality Date    Hernia surgery  2016    Umbilical          Ventral hernia repair  3/18/2016     (Supraumbilical/umbilical)      Family History   Problem Relation Age of Onset    Diabetes Maternal Grandmother     Pancreatic Cancer Paternal Grandmother     Cancer Paternal Grandmother         unknown type      Social History     Socioeconomic History    Marital status:     Number of children: 3   Occupational History    Occupation: dental assistant   Tobacco Use    Smoking status: Never     Passive exposure: Never    Smokeless tobacco: Never   Vaping Use    Vaping status: Never Used   Substance and Sexual Activity    Alcohol use: No     Alcohol/week: 0.0 standard drinks of alcohol    Drug use: No   Other Topics Concern    Caffeine Concern No    Exercise No    Reaction to local anesthetic No   Social History Narrative    The patient does not use an assistive device..      The patient does not live in a home with stairs.     Social Determinants of Health     Financial Resource Strain: Low Risk  (3/15/2024)    Financial Resource Strain     Difficulty of Paying Living Expenses: Not hard at all     Med Affordability: No   Food Insecurity: No Food Insecurity (3/15/2024)    Food Insecurity     Food Insecurity: Never true   Transportation Needs: No Transportation Needs (3/15/2024)    Transportation Needs     Lack of Transportation: No   Stress: No Stress Concern Present (3/15/2024)    Stress     Feeling of Stress : No   Housing Stability: Low Risk  (3/15/2024)    Housing Stability     Housing Instability: No          PHYSICAL EXAMINATION:  BP 93/58   Pulse 90   Wt 115 lb (52.2 kg)   LMP 01/19/2024   BMI 19.74 kg/m²   Physical Exam  Constitutional:       Appearance: Normal appearance.   Abdominal:      Palpations: Abdomen is soft.      Tenderness: There is no abdominal tenderness.   Neurological:      Mental Status: She is alert.   Psychiatric:         Mood and Affect: Mood normal.         Behavior: Behavior normal.           OBSTETRIC ULTRASOUND  The patient had a level II ultrasound today which revealed size  consistent with dates and a normal detailed anatomic survey.  Ultrasound Findings:  Single IUP in cephalic presenta??on.  Placenta is posterior.  A 3 vessel cord is noted.  Cardiac activity is present at 146 bpm   g ( 0 lb 12 oz);  MVP is 5.4 cm .  The fetal measurements are consistent with the established EDC. No ultrasound evidence of structural  abnormali??es are seen today. The nasal bone is present. No ultrasound evidence of markers for aneuploidy are  seen. She understands that ultrasound exam cannot exclude gene??c abnormalites and that genetic testing is  recommended. The limitations of ultrasound were discussed.  Uterus and adnexa appeared normal today on US  See PACS/Imaging Tab For Complete Ultrasound Report  I interpreted the results and reviewed them with the patient.    DISCUSSION  During her visit we discussed and reviewed the following issues:  ADVANCED MATERNAL AGE    Background  I reviewed with the patient that pregnancies in women of advanced maternal age (35 or older at delivery) are associated with elevated risks. Specifically, there is a higher rate of:  Fetal malformations  Preeclampsia  Gestational diabetes  Intrauterine fetal death    As a result, enhanced pregnancy surveillance is advised for these patients including a comprehensive ultrasound to assess for fetal malformations (at 20 weeks) and a third trimester ultrasound assessment for fetal growth (at 32 weeks). In addition, weekly NST's (initiating at 36 weeks gestation for women 35-39 years and at 32 weeks gestation for women 40 years and older) are also advised. Routine obstetric care is more than adequate to assess for gestational diabetes and preeclampsia; hence, no further significant alterations in obstetric care are advised.    Medical Complications    Women 35 years of age or older can expect to experience two to three fold higher rates of hospitalization,  delivery, and pregnancy-related complications when  compared to their younger counterparts.  The two most common medical problems complicating these  pregnanccies are hypertension and diabetes.   The incidence of preeclampsia in the general obstetric population is 3 to 4 percent; this increases to 5 to 10 percent in women over age 40 and is as high as 35 percent in women over age 50.   The incidence of gestational diabetes in the general obstetric population is 3 percent, rising to 7 to 12 percent in women over age 40 and 20 percent in women over age 50.  Women 35 years of age or older are more likely to be delivered by . The  delivery rate in the general obstetric population of the [place] is almost 30 percent, compared to almost 50 percent in women over age 40 to 45 and almost 80 percent in women age 50 to 63.          Fetal Death        A decision analysis tool using data from the [place] [employer]  [employer] predicted a strategy of weekly antepartum testing and labor induction would lower the risk of unexplained fetal death in women 35 years of age or older. In this model, weekly testing starting at 36 weeks of gestation would drop the risk of fetal death from 5.2 to 1.3 per 1000 pregnancies. While a policy of antepartum testing in older women does increase the chance that a women will be induced (71 inductions per fetal death averted) and thereby increases her risk of having a  delivery, only 14 additional cesareans would need to be performed to avert one unexplained fetal death.  Hence, weekly NST's are advised for women of advanced maternal age; testing should be initiated at 36 weeks for women 35-39 years and at 32 weeks for women 40 years and older.    Fetal Malformations    Cardiac malformations, clubfoot, and diaphragmatic hernia appear to occur with increased frequency in offspring of older women. These abnormalities are structural and unrelated to aneuploidy, thus they would not be detected by karyotype analysis.   For these reasons a complete, detailed ultrasound (level II) is advised even if the fetus has a normal karyotype.      Fetal Aneuploidy      Invasive Testing  I offered invasive genetic testing (amniocentesis, chorionic villus sampling) after reviewing the diagnostic accuracy of these tests as well as the procedure associated loss rate (1:500 for genetic amniocentesis).    She ultimately does not desire invasive genetic testing.     We discussed  the increased risk of chromosomal abnormalities associated with advanced maternal age at age  39 year old. She understands that ultrasound exam cannot exclude potential genetic abnormalities.  Her estimated risk based on maternal age at term with any chromosome abnormality is about 1: 85 and with Down Syndrome is about 1: 104.   We also discussed the risks and benefits of having  genetic testing ([facility] and amniocentesis) performed.      Non-invasive Pregnancy Testing (NIPT)  I reviewed current non-invasive screening options. Currently non-invasive pregnancy testing (NIPT) offers the highest detection rate (with the lowest false positive rate) for the detection of fetal aneuploidy amongst high-risk patients. The limitations of detailed mid-trimester sonography was reviewed with the patient. First trimester screening and second trimester multiple-marker serum serum screening as alternative aneuploidy screening options were also reviewed. However, both of these tests are associated with lower detection and higher false positive rates.      We discussed  the inter-relationship of maternal epilepsy, drug induced-embryopathy and antiepileptic drug metabolism and pharmacokinetics during pregnancy. About one-third of patients will have increase in frequency and the remainder experience no change or have less seizure activity.        When seizure activity increases during pregnancy, it is commonly related to deliberate patient non-compliance due to concern of teratogenic effects.  There appears to be less recurrence of seizure activity when the patient is well controlled and fewer number of seizures occuring in the nine months prior to conception.       Most AED levels will decrease during pregnancy and can effect seizure frequency. The drug levels should be monitored regularly and adjusted appropriately.  AEDs are known to cross the placenta and believed to have some degree of teratogenic effects. The rate of congenital malformations in patients using the drugs are about 2-3 times that of babies of non-epileptic mothers or about 6-8% in epileptic pregnancies.       Multiagent therapy has been shown to enhance malformation rates, thus, it should be avoided when possible.   We discussed the potential congenital anomalies such as cardiac defects, cleft lip and palate, spina bifida and mildly dysmorphic face and fingers associated with specific AED like Phenytoin and Carbamazepine. The AED that prevent seizures in a given patient should be the medication to be used during pregnancy, since there is no general concensus as to which AED is least or most teratogenic.  Over 90% of women with epilepsy will have normal or good pregnancy outcomes.           Levetiracetam (Keppra) is an antiepileptic medication.  It crosses the placenta and adverse events have been observed in animal reproduction studies therefore it is a category C medication.  The [place] AED registry has published data from women taking levetiracetam and there was no increased rate of birth defects above the baseline risk of epileptic women observed.  Pregnant women may enroll themselves into the [place] AED registry by calling [phone] or online at http://www.aedpregnancyregistry.org/.     Serum concentration decrease as the pregnancy progresses therefore the level should be monitored throughout the pregnancy.    No difficulty with her vaginal delivery of 10 lb 10 oz baby    GLUCOSE 1HR OB   Date Value Ref Range  Status   03/18/2024 136 (H) See comment mg/dL Final     Comment:     Lab Results   Component Value Date    GLUFG 77 04/22/2024    GLU1G 159 04/22/2024    GLU2G 121 04/22/2024    GLU3G 94 04/22/2024      IMPRESSION:  IUP at 20w0d   AMA declines aneuploidy screening and invasive testing  Seizure disorder  History macrosomia    RECOMMENDATIONS:  Continue care with Dr. Villegas  Follow-up Growth ultrasound with BPP at 32 weeks.  Weekly NST's at 36 weeks.          Thank you for allowing me to participate in the care of your patient.  Please do not hesitate to contact me if additional questions or concerns arise.      Beti Sams M.D.    30 minutes spent in review of records, patient consultation, documentation and coordination of care.  The relevant clinical matter(s) are summarized above.     Note to patient and family  The 21st Century Cures Act makes medical notes available to patients in the interest of transparency.  However, please be advised that this is a medical document.  It is intended as [id number] communication.  It is written and medical language may contain abbreviations or verbiage that are technical and unfamiliar.  It may appear blunt or direct.  Medical documents are intended to carry relevant information, facts as evident, and the clinical opinion of the practitioner.

## 2024-06-12 ENCOUNTER — ROUTINE PRENATAL (OUTPATIENT)
Dept: OBGYN CLINIC | Facility: CLINIC | Age: 39
End: 2024-06-12
Payer: COMMERCIAL

## 2024-06-12 VITALS
HEART RATE: 88 BPM | BODY MASS INDEX: 20 KG/M2 | SYSTOLIC BLOOD PRESSURE: 91 MMHG | WEIGHT: 114 LBS | DIASTOLIC BLOOD PRESSURE: 56 MMHG

## 2024-06-12 DIAGNOSIS — Z34.92 ENCOUNTER FOR SUPERVISION OF NORMAL PREGNANCY IN SECOND TRIMESTER, UNSPECIFIED GRAVIDITY (HCC): Primary | ICD-10-CM

## 2024-06-12 LAB
BILIRUBIN: NEGATIVE
GLUCOSE (URINE DIPSTICK): NEGATIVE MG/DL
KETONES (URINE DIPSTICK): NEGATIVE MG/DL
MULTISTIX LOT#: 57 NUMERIC
NITRITE, URINE: NEGATIVE
PH, URINE: 6 (ref 4.5–8)
SPECIFIC GRAVITY: 1.02 (ref 1–1.03)
UROBILINOGEN,SEMI-QN: 0.2 MG/DL (ref 0–1.9)

## 2024-06-12 PROCEDURE — 81002 URINALYSIS NONAUTO W/O SCOPE: CPT | Performed by: OBSTETRICS & GYNECOLOGY

## 2024-06-12 PROCEDURE — 3078F DIAST BP <80 MM HG: CPT | Performed by: OBSTETRICS & GYNECOLOGY

## 2024-06-12 PROCEDURE — 3074F SYST BP LT 130 MM HG: CPT | Performed by: OBSTETRICS & GYNECOLOGY

## 2024-06-12 NOTE — PROGRESS NOTES
Feeling well.  Good fetal movement.  On Keppra 750mg--following with Dr Palacios.  Normal level 2.  RTC 4 wks.

## 2024-06-13 ENCOUNTER — LAB ENCOUNTER (OUTPATIENT)
Dept: LAB | Facility: HOSPITAL | Age: 39
End: 2024-06-13
Attending: Other
Payer: COMMERCIAL

## 2024-06-13 DIAGNOSIS — Z79.899 ENCOUNTER FOR LONG-TERM (CURRENT) DRUG USE: ICD-10-CM

## 2024-06-13 PROCEDURE — 80177 DRUG SCRN QUAN LEVETIRACETAM: CPT | Performed by: OTHER

## 2024-06-15 LAB — LEVETIRACETAM LVL: 35.2 UG/ML

## 2024-07-02 ENCOUNTER — PATIENT MESSAGE (OUTPATIENT)
Dept: FAMILY MEDICINE CLINIC | Facility: CLINIC | Age: 39
End: 2024-07-02

## 2024-07-10 ENCOUNTER — ROUTINE PRENATAL (OUTPATIENT)
Dept: OBGYN CLINIC | Facility: CLINIC | Age: 39
End: 2024-07-10
Payer: COMMERCIAL

## 2024-07-10 VITALS
HEART RATE: 80 BPM | DIASTOLIC BLOOD PRESSURE: 62 MMHG | SYSTOLIC BLOOD PRESSURE: 97 MMHG | BODY MASS INDEX: 21 KG/M2 | WEIGHT: 121.81 LBS

## 2024-07-10 DIAGNOSIS — Z34.92 ENCOUNTER FOR SUPERVISION OF NORMAL PREGNANCY IN SECOND TRIMESTER, UNSPECIFIED GRAVIDITY (HCC): Primary | ICD-10-CM

## 2024-07-10 LAB
APPEARANCE: CLEAR
BILIRUBIN: NEGATIVE
GLUCOSE (URINE DIPSTICK): NEGATIVE MG/DL
KETONES (URINE DIPSTICK): NEGATIVE MG/DL
MULTISTIX LOT#: ABNORMAL NUMERIC
NITRITE, URINE: NEGATIVE
OCCULT BLOOD: NEGATIVE
PH, URINE: 7.5 (ref 4.5–8)
PROTEIN (URINE DIPSTICK): NEGATIVE MG/DL
SPECIFIC GRAVITY: 1.01 (ref 1–1.03)
URINE-COLOR: YELLOW
UROBILINOGEN,SEMI-QN: 0.2 MG/DL (ref 0–1.9)

## 2024-07-10 PROCEDURE — 3074F SYST BP LT 130 MM HG: CPT | Performed by: OBSTETRICS & GYNECOLOGY

## 2024-07-10 PROCEDURE — 81002 URINALYSIS NONAUTO W/O SCOPE: CPT | Performed by: OBSTETRICS & GYNECOLOGY

## 2024-07-10 PROCEDURE — 3078F DIAST BP <80 MM HG: CPT | Performed by: OBSTETRICS & GYNECOLOGY

## 2024-07-13 ENCOUNTER — LAB ENCOUNTER (OUTPATIENT)
Dept: LAB | Facility: HOSPITAL | Age: 39
End: 2024-07-13
Attending: Other
Payer: COMMERCIAL

## 2024-07-13 DIAGNOSIS — Z79.899 ENCOUNTER FOR LONG-TERM (CURRENT) DRUG USE: ICD-10-CM

## 2024-07-13 PROCEDURE — 80177 DRUG SCRN QUAN LEVETIRACETAM: CPT | Performed by: OTHER

## 2024-07-17 LAB — LEVETIRACETAM LVL: 28 UG/ML

## 2024-07-20 ENCOUNTER — LAB ENCOUNTER (OUTPATIENT)
Dept: LAB | Facility: HOSPITAL | Age: 39
End: 2024-07-20
Attending: OBSTETRICS & GYNECOLOGY
Payer: COMMERCIAL

## 2024-07-20 DIAGNOSIS — Z34.92 ENCOUNTER FOR SUPERVISION OF NORMAL PREGNANCY IN SECOND TRIMESTER, UNSPECIFIED GRAVIDITY (HCC): ICD-10-CM

## 2024-07-20 LAB
DEPRECATED RDW RBC AUTO: 49.2 FL (ref 35.1–46.3)
ERYTHROCYTE [DISTWIDTH] IN BLOOD BY AUTOMATED COUNT: 13.7 % (ref 11–15)
GLUCOSE 1H P GLC SERPL-MCNC: 129 MG/DL
HCT VFR BLD AUTO: 31.7 %
HGB BLD-MCNC: 11 G/DL
MCH RBC QN AUTO: 33.7 PG (ref 26–34)
MCHC RBC AUTO-ENTMCNC: 34.7 G/DL (ref 31–37)
MCV RBC AUTO: 97.2 FL
PLATELET # BLD AUTO: 169 10(3)UL (ref 150–450)
RBC # BLD AUTO: 3.26 X10(6)UL
WBC # BLD AUTO: 7.5 X10(3) UL (ref 4–11)

## 2024-07-20 PROCEDURE — 82950 GLUCOSE TEST: CPT

## 2024-07-20 PROCEDURE — 36415 COLL VENOUS BLD VENIPUNCTURE: CPT

## 2024-07-20 PROCEDURE — 85027 COMPLETE CBC AUTOMATED: CPT

## 2024-07-22 ENCOUNTER — OFFICE VISIT (OUTPATIENT)
Facility: CLINIC | Age: 39
End: 2024-07-22
Payer: COMMERCIAL

## 2024-07-22 VITALS — DIASTOLIC BLOOD PRESSURE: 64 MMHG | HEART RATE: 78 BPM | SYSTOLIC BLOOD PRESSURE: 94 MMHG | RESPIRATION RATE: 16 BRPM

## 2024-07-22 DIAGNOSIS — G40.909 NONINTRACTABLE EPILEPSY WITHOUT STATUS EPILEPTICUS, UNSPECIFIED EPILEPSY TYPE (HCC): Primary | ICD-10-CM

## 2024-07-22 DIAGNOSIS — O99.350 EPILEPSY AFFECTING PREGNANCY, ANTEPARTUM (HCC): ICD-10-CM

## 2024-07-22 DIAGNOSIS — G40.909 EPILEPSY AFFECTING PREGNANCY, ANTEPARTUM (HCC): ICD-10-CM

## 2024-07-22 PROCEDURE — 3078F DIAST BP <80 MM HG: CPT | Performed by: OTHER

## 2024-07-22 PROCEDURE — 99215 OFFICE O/P EST HI 40 MIN: CPT | Performed by: OTHER

## 2024-07-22 PROCEDURE — 3074F SYST BP LT 130 MM HG: CPT | Performed by: OTHER

## 2024-07-22 NOTE — PROGRESS NOTES
Neurology History & Physical     ASSESSMENT & PLAN:      ICD-10-CM    1. Nonintractable epilepsy without status epilepticus, unspecified epilepsy type (HCC)  G40.909       2. Epilepsy affecting pregnancy, antepartum (HCC)  O99.350     G40.909           Epilepsy, unspecified, possibly generalized.  Well controlled but 27 weeks pregnant and thus high risk with threat to function and needs intensive monitoring.  Continue  mg BID, and FA 1 mg daily.  However, in future LEV may need to be increased, since level may drop eleanor by 3rd trimester.  Check monthly levels.    We discussed medication side effects and activity precautions. We discussed symptoms that would warrant urgent/emergent evaluation. Patient verbalized understanding and agreement.    Return in about 4 months (around 11/22/2024).       ~~~~~~~~~~~~~~~~~~~~~~~~~~~    CHIEF COMPLAINT / REASON FOR VISIT:    Chief Complaint   Patient presents with    Seizures     No seizures since last visit.      HISTORY OBTAINED FROM:  Patient and others as above  Chart review    HISTORY OF PRESENT ILLNESS:  Sveta Mckeon is a 39 year old female with seizures since age 16, exclusively nocturnal convulsions, and also staring event (this only happened initially)  Last seizures Jan and Sept 2023 (convulsions both triggered by sleep deprivation).  Have been triggered by sleep deprivation and post-partum in past.    INTERIM HISTORY:  Continues on  mg BID, had terrible sleep and anxiety on higher dose, much improved back on 750 mg BID.  Otherwise no further issues, no seizures or staring events.  Pregnancy uncomplicated so far, she is due in October (currently 27 weeks).     Driving status:  Driving    Previous medication trials:  LCM - nightmares  CBZ for many years    DATA REVIEWED:  As documented in the HPI    July 2024  LEV 28.0    June 2024  LEV 35.2    May 2024  LEV 21.5    March 2024  LEV 26.2  Neuro note (Mark) - apparently did have abnormal EEG in  2015  EEG unremarkable    2023  CBZ 4.5    2021  Head CT unremarkable       PHYSICAL EXAMINATION:  BP 94/64   Pulse 78   Resp 16   LMP 2024     Gen: WDWN, in NAD  MSE: nl attn/conc, nl language, nl fund of knowledge  CN: EOMI, VFF, nl facial mvmt, nl palate, nl tongue  Motor: 5/5 x4  DTR: 2+ BUE and BLE  Coord: nl FTN b/I  Gait: normal    No Known Allergies    Current medications:   levetiracetam 750 MG Oral Tab Take 1 tablet (750 mg total) by mouth 2 (two) times daily. 60 tablet 1    folic acid 1 MG Oral Tab Take one tablet by mouth twice daily. 180 tablet 0    Prenatal 27-1 MG Oral Tab daily.      mometasone 0.1 % External Ointment Apply 1 Application topically daily. (Patient taking differently: Apply 1 Application  topically as needed.) 30 g 1       Past Medical History:    Environmental allergies    History of pregnancy        Seasonal allergies    Seizure disorder (HCC)    last seizure 2015       Past Surgical History:   Procedure Laterality Date    Hernia surgery      Umbilical          Ventral hernia repair  3/18/2016    (Supraumbilical/umbilical)       Social History     Socioeconomic History    Marital status:     Number of children: 3   Occupational History    Occupation: dental assistant   Tobacco Use    Smoking status: Never     Passive exposure: Never    Smokeless tobacco: Never   Vaping Use    Vaping status: Never Used   Substance and Sexual Activity    Alcohol use: Never    Drug use: Never   Other Topics Concern    Caffeine Concern No    Exercise No    Reaction to local anesthetic No       Family History   Problem Relation Age of Onset    Diabetes Maternal Grandmother     Pancreatic Cancer Paternal Grandmother     Cancer Paternal Grandmother         unknown type     Earline Palacios MD, FAES, FAAN  Board-Certified in Neurology, Epilepsy, and Clinical Neurophysiology  [facility] [place]

## 2024-07-22 NOTE — PATIENT INSTRUCTIONS
After your visit at the [facility] office  today,  please direct any follow up questions or medication needs to the staff in our  [place] office so that your concerns may be promptly addressed.  We are available through [employer] or at the numbers below:    The phone number is:   [phone] option #1    The fax number is:  [phone]    Your pharmacy should also send any requests electronically to the [place] office.    Refill policies:    Allow 2-3 business days for refills; controlled substances may take longer.  Contact your pharmacy at least 5 days prior to running out of medication and have them send an electronic request or submit request through the “request refill” option in your [employer] account.  Refills are not addressed on weekends; covering physicians do not authorize routine medications on weekends.  No narcotics or controlled substances are refilled after noon on Fridays or by on call physicians.  By law, narcotics must be electronically prescribed.  A 30 day supply with no refills is the maximum allowed.  If your prescription is due for a refill, you may be due for a follow up appointment.  To best provide you care, patients receiving routine medications need to be seen at least once a year.  Patients receiving narcotic/controlled substance medications need to be seen at least once every 3 months.  In the event that your preferred pharmacy does not have the requested medication in stock (e.g. Backordered), it is your responsibility to find another pharmacy that has the requested medication available.  We will gladly send a new prescription to that pharmacy at your request.    Scheduling Tests:    If your physician has ordered radiology tests such as MRI or CT scans, please contact Central Scheduling at [phone] right away to schedule the test.  Once scheduled, the [facility] Centralized Referral Team will work with your insurance carrier to obtain pre-certification or prior authorization.   Depending on your insurance carrier, approval may take 3-10 days.  It is highly recommended patients assure they have received an authorization before having a test performed.  If test is done without insurance authorization, patient may be responsible for the entire amount billed.      Precertification and Prior Authorizations:  If your physician has recommended that you have a procedure or additional testing performed the [facility] Centralized Referral Team will contact your insurance carrier to obtain pre-certification or prior authorization.    You are strongly encouraged to contact your insurance carrier to verify that your procedure/test has been approved and is a COVERED benefit.  Although the [facility] Centralized Referral Team does its due diligence, the insurance carrier gives the disclaimer that \"Although the procedure is authorized, this does not guarantee payment.\"    Ultimately the patient is responsible for payment.   Thank you for your understanding in this matter.  Paperwork Completion:  If you require FMLA or disability paperwork for your recovery, please make sure to either drop it off or have it faxed to our office at [phone]. Be sure the form has your name and date of birth on it.  The form will be faxed to our Forms Department and they will complete it for you.  There is a 25$ fee for all forms that need to be filled out.  Please be aware there is a 10-14 day turnaround time.  You will need to sign a release of information (ROSITA) form if your paperwork does not come with one.  You may call the Forms Department with any questions at [phone].  Their fax number is [phone].

## 2024-07-26 ENCOUNTER — TELEPHONE (OUTPATIENT)
Dept: FAMILY MEDICINE CLINIC | Facility: CLINIC | Age: 39
End: 2024-07-26

## 2024-07-26 ENCOUNTER — NURSE ONLY (OUTPATIENT)
Dept: FAMILY MEDICINE CLINIC | Facility: CLINIC | Age: 39
End: 2024-07-26
Payer: COMMERCIAL

## 2024-07-26 DIAGNOSIS — Z23 NEED FOR DIPHTHERIA-TETANUS-PERTUSSIS (TDAP) VACCINE: Primary | ICD-10-CM

## 2024-07-26 PROCEDURE — 90715 TDAP VACCINE 7 YRS/> IM: CPT | Performed by: FAMILY MEDICINE

## 2024-07-26 PROCEDURE — 90471 IMMUNIZATION ADMIN: CPT | Performed by: FAMILY MEDICINE

## 2024-07-26 NOTE — PROGRESS NOTES
Patient is here for her tdap vaccine. I verified full name, and reason for nurse visit.     Patient tolerated injection well.

## 2024-07-27 DIAGNOSIS — G40.909 NONINTRACTABLE EPILEPSY WITHOUT STATUS EPILEPTICUS, UNSPECIFIED EPILEPSY TYPE (HCC): ICD-10-CM

## 2024-07-29 RX ORDER — LEVETIRACETAM 750 MG/1
750 TABLET ORAL 2 TIMES DAILY
Qty: 60 TABLET | Refills: 2 | Status: SHIPPED | OUTPATIENT
Start: 2024-07-29

## 2024-07-29 NOTE — TELEPHONE ENCOUNTER
Medication: LEVETIRACETAM 750 MG Oral Tab      Date of last refill: 05/31/2024 (#60/1)  [place]     Last office visit: 07/22/2024  Due back to clinic per last office note:  Around 11/22/2024  Date next office visit scheduled:    Future Appointments   Date Time Provider Department Center   8/14/2024  9:00 AM Lidia Joya MD [facility]   8/21/2024  1:20 PM Jose Nash, DO [facility]   8/30/2024 11:30 AM [facility] FETAL [facility] [place]   9/4/2024 12:30 PM Jose Nash, DO [facility]   9/20/2024  9:50 AM Jose Nash, DO [facility]   9/27/2024  9:40 AM Jose Nash, DO [facility]   10/4/2024 10:30 AM Jose Nash, DO [facility]   10/9/2024  9:50 AM Jose Nash, DO [facility]   10/18/2024  9:50 AM Jose Nash, DO [facility]   10/25/2024  9:50 AM Jose Nash, DO [facility]   10/30/2024  1:00 PM Jsoe Nash, DO [facility]   11/8/2024  9:50 AM Jose Nash, DO [facility]   11/25/2024 10:00 AM Earline Palacios MD [facility] [place]           Last OV note recommendation:    ASSESSMENT & PLAN:         ICD-10-CM     1. Nonintractable epilepsy without status epilepticus, unspecified epilepsy type (HCC)  G40.909         2. Epilepsy affecting pregnancy, antepartum (HCC)  O99.350       G40.909               Epilepsy, unspecified, possibly generalized.  Well controlled but 27 weeks pregnant and thus high risk with threat to function and needs intensive monitoring.  Continue  mg BID, and FA 1 mg daily.  However, in future LEV may need to be increased, since level may drop eleanor by 3rd trimester.  Check monthly levels.     We discussed medication side effects and activity precautions. We discussed symptoms that would warrant urgent/emergent evaluation. Patient verbalized understanding and agreement.     Return in about 4 months (around 11/22/2024).

## 2024-07-31 ENCOUNTER — HOSPITAL ENCOUNTER (OUTPATIENT)
Facility: HOSPITAL | Age: 39
Discharge: HOME | End: 2024-07-31
Attending: OBSTETRICS & GYNECOLOGY | Admitting: OBSTETRICS & GYNECOLOGY
Payer: COMMERCIAL

## 2024-07-31 ENCOUNTER — TELEPHONE (OUTPATIENT)
Dept: OBGYN CLINIC | Facility: CLINIC | Age: 39
End: 2024-07-31

## 2024-07-31 ENCOUNTER — HOSPITAL ENCOUNTER (EMERGENCY)
Facility: HOSPITAL | Age: 39
Discharge: HOME | End: 2024-07-31
Attending: EMERGENCY MEDICINE
Payer: COMMERCIAL

## 2024-07-31 VITALS
BODY MASS INDEX: 21.68 KG/M2 | WEIGHT: 127 LBS | OXYGEN SATURATION: 99 % | HEART RATE: 59 BPM | DIASTOLIC BLOOD PRESSURE: 57 MMHG | RESPIRATION RATE: 18 BRPM | TEMPERATURE: 99 F | HEIGHT: 64 IN | SYSTOLIC BLOOD PRESSURE: 107 MMHG

## 2024-07-31 VITALS
RESPIRATION RATE: 17 BRPM | HEART RATE: 63 BPM | TEMPERATURE: 98 F | DIASTOLIC BLOOD PRESSURE: 52 MMHG | SYSTOLIC BLOOD PRESSURE: 100 MMHG

## 2024-07-31 DIAGNOSIS — R11.2 NAUSEA VOMITING AND DIARRHEA: Primary | ICD-10-CM

## 2024-07-31 DIAGNOSIS — R19.7 NAUSEA VOMITING AND DIARRHEA: Primary | ICD-10-CM

## 2024-07-31 LAB
ALBUMIN SERPL-MCNC: 4 G/DL (ref 3.2–4.8)
ALP LIVER SERPL-CCNC: 113 U/L
ALT SERPL-CCNC: 19 U/L
ANION GAP SERPL CALC-SCNC: 9 MMOL/L (ref 0–18)
AST SERPL-CCNC: 24 U/L (ref ?–34)
BASOPHILS # BLD AUTO: 0.02 X10(3) UL (ref 0–0.2)
BASOPHILS NFR BLD AUTO: 0.2 %
BILIRUB DIRECT SERPL-MCNC: 0.1 MG/DL (ref ?–0.3)
BILIRUB SERPL-MCNC: 0.6 MG/DL (ref 0.3–1.2)
BUN BLD-MCNC: <5 MG/DL (ref 9–23)
CALCIUM BLD-MCNC: 9.1 MG/DL (ref 8.7–10.4)
CHLORIDE SERPL-SCNC: 109 MMOL/L (ref 98–112)
CO2 SERPL-SCNC: 21 MMOL/L (ref 21–32)
CREAT BLD-MCNC: 0.52 MG/DL
DEPRECATED RDW RBC AUTO: 45.3 FL (ref 35.1–46.3)
EGFRCR SERPLBLD CKD-EPI 2021: 121 ML/MIN/1.73M2 (ref 60–?)
EOSINOPHIL # BLD AUTO: 0.06 X10(3) UL (ref 0–0.7)
EOSINOPHIL NFR BLD AUTO: 0.7 %
ERYTHROCYTE [DISTWIDTH] IN BLOOD BY AUTOMATED COUNT: 13.2 % (ref 11–15)
GLUCOSE BLD-MCNC: 80 MG/DL (ref 70–99)
HCT VFR BLD AUTO: 36.2 %
HGB BLD-MCNC: 12.9 G/DL
IMM GRANULOCYTES # BLD AUTO: 0.03 X10(3) UL (ref 0–1)
IMM GRANULOCYTES NFR BLD: 0.3 %
LIPASE SERPL-CCNC: 33 U/L (ref 12–53)
LYMPHOCYTES # BLD AUTO: 1.35 X10(3) UL (ref 1–4)
LYMPHOCYTES NFR BLD AUTO: 15.5 %
MCH RBC QN AUTO: 33.2 PG (ref 26–34)
MCHC RBC AUTO-ENTMCNC: 35.6 G/DL (ref 31–37)
MCV RBC AUTO: 93.3 FL
MONOCYTES # BLD AUTO: 0.37 X10(3) UL (ref 0.1–1)
MONOCYTES NFR BLD AUTO: 4.3 %
NEUTROPHILS # BLD AUTO: 6.87 X10 (3) UL (ref 1.5–7.7)
NEUTROPHILS # BLD AUTO: 6.87 X10(3) UL (ref 1.5–7.7)
NEUTROPHILS NFR BLD AUTO: 79 %
PLATELET # BLD AUTO: 212 10(3)UL (ref 150–450)
POTASSIUM SERPL-SCNC: 3.7 MMOL/L (ref 3.5–5.1)
PROT SERPL-MCNC: 6.8 G/DL (ref 5.7–8.2)
RBC # BLD AUTO: 3.88 X10(6)UL
SODIUM SERPL-SCNC: 139 MMOL/L (ref 136–145)
WBC # BLD AUTO: 8.7 X10(3) UL (ref 4–11)

## 2024-07-31 PROCEDURE — 80076 HEPATIC FUNCTION PANEL: CPT | Performed by: EMERGENCY MEDICINE

## 2024-07-31 PROCEDURE — 85025 COMPLETE CBC W/AUTO DIFF WBC: CPT | Performed by: EMERGENCY MEDICINE

## 2024-07-31 PROCEDURE — 96374 THER/PROPH/DIAG INJ IV PUSH: CPT

## 2024-07-31 PROCEDURE — 96361 HYDRATE IV INFUSION ADD-ON: CPT

## 2024-07-31 PROCEDURE — 59025 FETAL NON-STRESS TEST: CPT | Performed by: OBSTETRICS & GYNECOLOGY

## 2024-07-31 PROCEDURE — 99284 EMERGENCY DEPT VISIT MOD MDM: CPT

## 2024-07-31 PROCEDURE — 80048 BASIC METABOLIC PNL TOTAL CA: CPT | Performed by: EMERGENCY MEDICINE

## 2024-07-31 PROCEDURE — 83690 ASSAY OF LIPASE: CPT | Performed by: EMERGENCY MEDICINE

## 2024-07-31 RX ORDER — ONDANSETRON 2 MG/ML
4 INJECTION INTRAMUSCULAR; INTRAVENOUS ONCE
Status: COMPLETED | OUTPATIENT
Start: 2024-07-31 | End: 2024-07-31

## 2024-07-31 RX ORDER — ONDANSETRON 4 MG/1
4 TABLET, ORALLY DISINTEGRATING ORAL EVERY 4 HOURS PRN
Qty: 10 TABLET | Refills: 0 | Status: SHIPPED | OUTPATIENT
Start: 2024-07-31 | End: 2024-08-07

## 2024-07-31 NOTE — ED PROVIDER NOTES
Patient Seen in: [facility] Emergency Department      History     Chief Complaint   Patient presents with    Nausea/Vomiting/Diarrhea            Stated Complaint: N/V, 27 wks preg    Subjective:   HPI  39-year-old female with seizures, 27 weeks of pregnancy presents for evaluation of vomiting and diarrhea.  Symptoms started yesterday.  She is not able to keep anything down.  No blood in the stools or emesis.  Symptoms started after eating a leftover wrap 2 days ago.  No fevers.  No abdominal pain.  No vaginal bleeding or discharge.  No back or flank pain.  No dysuria or hematuria.      Objective:   Past Medical History:    Environmental allergies    History of pregnancy        Seasonal allergies    Seizure disorder (HCC)    last seizure 2015              Past Surgical History:   Procedure Laterality Date    Hernia surgery  2016    Umbilical          Ventral hernia repair  3/18/2016    (Supraumbilical/umbilical)                Social History     Socioeconomic History    Marital status:     Number of children: 3   Occupational History    Occupation: dental assistant   Tobacco Use    Smoking status: Never     Passive exposure: Never    Smokeless tobacco: Never   Vaping Use    Vaping status: Never Used   Substance and Sexual Activity    Alcohol use: Never    Drug use: Never   Other Topics Concern    Caffeine Concern No    Exercise No    Reaction to local anesthetic No   Social History Narrative    The patient does not use an assistive device..      The patient does not live in a home with stairs.     Social Determinants of Health     Financial Resource Strain: Low Risk  (3/15/2024)    Financial Resource Strain     Difficulty of Paying Living Expenses: Not hard at all     Med Affordability: No   Food Insecurity: No Food Insecurity (3/15/2024)    Food Insecurity     Food Insecurity: Never true   Transportation Needs: No Transportation Needs (3/15/2024)    Transportation Needs     Lack of  Transportation: No   Stress: No Stress Concern Present (3/15/2024)    Stress     Feeling of Stress : No   Housing Stability: Low Risk  (3/15/2024)    Housing Stability     Housing Instability: No              Review of Systems    Positive for stated Chief Complaint: Nausea/Vomiting/Diarrhea (/)    Other systems are as noted in HPI.  Constitutional and vital signs reviewed.      All other systems reviewed and negative except as noted above.    Physical Exam     ED Triage Vitals [07/31/24 1158]   /70   Pulse 89   Resp 18   Temp 98.6 °F (37 °C)   Temp src Temporal   SpO2 98 %   O2 Device None (Room air)       Current Vitals:   Vital Signs  BP: 107/57  Pulse: 59  Resp: 18  Temp: 98.6 °F (37 °C)  Temp src: Temporal  Fetal Heart Tones: 144  MAP (mmHg): 71    Oxygen Therapy  SpO2: 99 %  O2 Device: None (Room air)            Physical Exam  Vitals and nursing note reviewed.   Constitutional:       Appearance: She is well-developed.   HENT:      Head: Normocephalic and atraumatic.   Eyes:      Extraocular Movements: Extraocular movements intact.   Cardiovascular:      Rate and Rhythm: Normal rate and regular rhythm.      Heart sounds: Normal heart sounds.   Pulmonary:      Effort: Pulmonary effort is normal.      Breath sounds: Normal breath sounds.   Abdominal:      General: There is no distension.      Palpations: Abdomen is soft.      Tenderness: There is no abdominal tenderness.   Genitourinary:     Comments: Appropriately gravid.  Fetal heart tones 144bpm  Musculoskeletal:         General: Normal range of motion.      Cervical back: Normal range of motion.   Skin:     General: Skin is warm.   Neurological:      Mental Status: She is alert.      Comments: No focal deficits       Differential diagnosis includes but is not limited to gastroenteritis, foodborne illness, toxin borne illness        ED Course     Labs Reviewed   BASIC METABOLIC PANEL (8) - Abnormal; Notable for the following components:       Result Value     BUN <5 (*)     Creatinine 0.52 (*)     All other components within normal limits   HEPATIC FUNCTION PANEL (7) - Abnormal; Notable for the following components:    Alkaline Phosphatase 113 (*)     All other components within normal limits   LIPASE - Normal   CBC WITH DIFFERENTIAL WITH PLATELET    Narrative:     The following orders were created for panel order CBC With Differential With Platelet.  Procedure                               Abnormality         Status                     ---------                               -----------         ------                     CBC W/ DIFFERENTIAL[[id number]]                              Final result                 Please view results for these tests on the individual orders.   CBC W/ DIFFERENTIAL                      MDM                         Medical Decision Making  VSS. Abdomen soft.  I ordered and interpreted BMP, CBC, LFTs and lipase which are unremarkable for emergent pathologies.  She was treated with fluid bolus and Zofran.  Subsequently she is able to tolerate p.o. and is feeling better.  She will be discharged in stable condition with short course of Zofran as needed.  She will be seen in [facility] after ER discharge.    Problems Addressed:  Nausea vomiting and diarrhea: complicated acute illness or injury with systemic symptoms    Amount and/or Complexity of Data Reviewed  Labs: ordered. Decision-making details documented in ED Course.    Risk  Prescription drug management.        Disposition and Plan     Clinical Impression:  1. Nausea vomiting and diarrhea         Disposition:  Discharge  7/31/2024  1:17 pm    Follow-up:  Jose Nash, DO  [address] [address] [phone]    Follow up            Medications Prescribed:  Discharge Medication List as of 7/31/2024  1:34 PM        START taking these medications    Details   ondansetron 4 MG Oral Tablet Dispersible Take 1 tablet (4 mg total) by mouth every 4 (four) hours as needed for  Nausea., Normal, Disp-10 tablet, R-0

## 2024-07-31 NOTE — ED QUICK NOTES
Per ED provider patient ok to discharge. Discharge instructions/medications reviewed with patient. Patient verbalizes understanding. Patient in NAD, ABCs intact. Patient stable and ambulatory at discharge. Patient left department with all belongings to go to [facility] via wheelchair by PCT

## 2024-07-31 NOTE — TELEPHONE ENCOUNTER
27w5d - vomiting and diarrhea since yesterday around 6am.   She denies exposure to anyone sick, but notes she started feeling unwell after eating a leftover wrap on Monday. She is unable to keep down fluids.   She reports yesterday was having stomach cramping, thought it was a contraction, but it was relieved after vomiting.   Directed to ER to request IV hydration given vomiting and diarrhea x >24 hours, unable to keep down fluids. Patient agrees to plan.

## 2024-07-31 NOTE — TRIAGE
[facility]  part of [facility]      Triage Note    Sveta Mckeon Patient Status:  Outpatient    3/1/1985 MRN [id number]   Location [facility] Attending Radhika Garnica MD   Hosp Day # 0 PCP Verna Vazquze MD      Para:   Estimated Date of Delivery: 10/25/24  Gestation: 27w5d    Chief Complaint     Assessment         Allergies:  No Known Allergies    No orders of the defined types were placed in this encounter.      Lab Results   Component Value Date    WBC 8.7 2024    HGB 12.9 2024    HCT 36.2 2024    .0 2024    CREATSERUM 0.52 (L) 2024    BUN <5 (L) 2024     2024    K 3.7 2024     2024    CO2 21.0 2024    GLU 80 2024    CA 9.1 2024    ALB 4.0 2024    ALKPHO 113 (H) 2024    BILT 0.6 2024    TP 6.8 2024    AST 24 2024    ALT 19 2024    TSH 0.635 2024    LIP 33 2024    RPR Non-Reactive 2012    B12 213 2021    FFN Negative 2017       Clinitek UA  Lab Results   Component Value Date    URCOLOR Dark yellow 2017    URCLA Clear 2017    GLUUR Negative 2017    URINEBILI Negative 2017    URINEKETONE >=160 (A) 2017    SPECGRAVITY 1.015 07/10/2024    PHUR 7.0 2017    PROTURINE 30 (A) 2017    UROBILI <2.0 2017    URINENITRITE Negative 2017    URINELEUK Trace (A) 2017    URINECUL No Growth at 18-24 hrs. 2024       UA  Lab Results   Component Value Date    COLORUR Yellow 2017    CLARITY Clear 2017    SPECGRAVITY 1.015 07/10/2024    PROUR Negative 2017    GLUUR Negative 2017    KETUR Negative 2017    BILUR Negative 2017    BLOODURINE Trace (A) 2017    NITRITE Negative 07/10/2024    UROBILINOGEN <2.0 2017    LEUUR Small (A) 2017    UASA Negative 2017       Vitals:    24  1400   BP: 100/52   BP Location: Left arm   Pulse: 63   Resp: 17   Temp: 97.9 °F (36.6 °C)   TempSrc: Oral       NST  Variability: Moderate           Accelerations: Yes           Decelerations: None            Baseline: 130 BPM           Uterine Irritability: No                                Contraction Frequency: x1                   Acoustic Stimulator: No           Nonstress Test Interpretation: Appropriate for gestational age           Nonstress Test Second Interpretation: Appropriate for gestational age          FHR Category: Category I             Additional Comments Comments: Pt is a  at 27.5 presented to ER for vomiting. EFM tracing reassuring for gestational age. No labor complaints voiced. Dr. Garnica informed of findings. Ok to discharge pt home. Pt provided with discharge instructions and labor precautions. Pt verbalizes understanding.     Chief Complaint   Patient presents with     Assessment     Pt seen in ER for vomit and diarrhea today. Cleared from ER. Pt states she is feeling baby moving. Denies feeling any contractions, vaginal bleeding or leaking of fluid.         Jennifer TRINIDAD RN  2024 2:41 PM    Diagnosis:  pregnancy undelivered  I have reviewed the NST and agree with the above findings and recommendations.   Radhika Garnica MD    2024    3:41 PM

## 2024-07-31 NOTE — TELEPHONE ENCOUNTER
Patient is 27w5d and states she has been vomiting since yesterday, unable to keep liquids down. Please advise

## 2024-07-31 NOTE — DISCHARGE INSTRUCTIONS
Please drink plenty of fluids at home.  Return to the emergency department if you develop high fevers, have persistent severe abdominal pain, or have bloody stools or vomit, as these could be signs of a more serious medical emergency.  Return to the emergency department if you are unable to keep down liquids because of severe nausea/vomiting.    Please take the Zofran as prescribed.  Placed under your tongue and let the medication to dissolve on its own.  Do not swallow whole.  Give the medication time to work prior to eating, approximately 30 minutes.  Do not attempt to eat fatty, greasy, heavy meals as this may make you nauseous despite the medication.  Please drink clear fluids (water, half-strength Gatorade, broth, Pedialyte) and eat, simple easy to digest foods.

## 2024-07-31 NOTE — PROGRESS NOTES
Pt is a 39 year old female admitted to TR2/TR2-A.     Chief Complaint   Patient presents with     Assessment     Pt seen in ER for vomit and diarrhea today. Cleared from ER. Pt states she is feeling baby moving. Denies feeling any contractions, vaginal bleeding or leaking of fluid.      Pt is  27w5d intra-uterine pregnancy.  History obtained, consents signed. Oriented to room, staff, and plan of care.

## 2024-07-31 NOTE — ED INITIAL ASSESSMENT (HPI)
Nausea, vomiting diarrhea starting yesterday, pt is 27 weeks pregnant, pt reports feeling active fetal movement. denies abd pain/contractions/vag bleed/discharge. Pt spoke with RN from OBGYN clinic on the phone and was told to be seen in ED. [facility] notified, pt to be seen in ED and then to [facility].

## 2024-08-06 NOTE — TELEPHONE ENCOUNTER
Patient called for follow up from ER visit. Patient states after being seen, N/V subsided. No longer had diarrhea. Patient doing well at this time.

## 2024-08-14 ENCOUNTER — ROUTINE PRENATAL (OUTPATIENT)
Dept: OBGYN CLINIC | Facility: CLINIC | Age: 39
End: 2024-08-14
Payer: COMMERCIAL

## 2024-08-14 VITALS
WEIGHT: 128.19 LBS | HEART RATE: 83 BPM | BODY MASS INDEX: 22 KG/M2 | SYSTOLIC BLOOD PRESSURE: 96 MMHG | DIASTOLIC BLOOD PRESSURE: 61 MMHG

## 2024-08-14 DIAGNOSIS — Z34.93 ENCOUNTER FOR SUPERVISION OF NORMAL PREGNANCY IN THIRD TRIMESTER, UNSPECIFIED GRAVIDITY (HCC): Primary | ICD-10-CM

## 2024-08-14 PROBLEM — O99.810 ABNORMAL MATERNAL GLUCOSE TOLERANCE, ANTEPARTUM (HCC): Status: RESOLVED | Noted: 2024-03-21 | Resolved: 2024-08-14

## 2024-08-14 LAB
APPEARANCE: CLEAR
BILIRUBIN: NEGATIVE
GLUCOSE (URINE DIPSTICK): NEGATIVE MG/DL
KETONES (URINE DIPSTICK): NEGATIVE MG/DL
MULTISTIX LOT#: ABNORMAL NUMERIC
NITRITE, URINE: NEGATIVE
OCCULT BLOOD: NEGATIVE
PH, URINE: 7 (ref 4.5–8)
PROTEIN (URINE DIPSTICK): NEGATIVE MG/DL
SPECIFIC GRAVITY: 1 (ref 1–1.03)
URINE-COLOR: YELLOW
UROBILINOGEN,SEMI-QN: 0.2 MG/DL (ref 0–1.9)

## 2024-08-14 PROCEDURE — 3078F DIAST BP <80 MM HG: CPT | Performed by: OBSTETRICS & GYNECOLOGY

## 2024-08-14 PROCEDURE — 3074F SYST BP LT 130 MM HG: CPT | Performed by: OBSTETRICS & GYNECOLOGY

## 2024-08-14 PROCEDURE — 81002 URINALYSIS NONAUTO W/O SCOPE: CPT | Performed by: OBSTETRICS & GYNECOLOGY

## 2024-08-16 NOTE — PROGRESS NOTES
Outpatient Maternal-Fetal Medicine Follow-Up    Dear Dr. Ngyuễn    Thank you for requesting an ultrasound and maternal-fetal medicine consultation on your patient Sveta Mckeon.  As you are aware she is a 39 year old female  with a sood pregnancy and an Estimated Date of Delivery: 10/25/24.  She returned to maternal-fetal medicine today for a follow-up visit.  Her history was reviewed from her prior visit and there were no interval changes.    Antepartum Risk Factors  AMA declines aneuploidy screening and invasive testing  Seizure disorder  History macrosomia    PHYSICAL EXAMINATION:  /52   Pulse 75   Wt 133 lb (60.3 kg)   LMP 2024   BMI 22.83 kg/m²   General: alert and oriented, no acute distress  Abdomen: gravid, soft, non-tender  Extremities: non-tender, no edema    OBSTETRIC ULTRASOUND  The patient had a follow-up growth ultrasound today which revealed normal interval fetal growth, BPP 8/8.    Ultrasound Findings:  Single IUP in cephalic presentation.    Placenta is posterior.   A 3 vessel cord is noted.  Cardiac activity is present at 135 bpm  EFW 1872 g ( 4 lb 2 oz); 46%.    KIRSTY is  11.7 cm.  MVP is 4.6 cm  BPP is 8/8.     The fetal measurements are consistent with established EDC. No gross ultrasound evidence of structural abnormalities are seen today. The patient understands that ultrasound cannot rule out all structural and chromosomal abnormalities.     See Imaging Tab For Complete Ultrasound Report  I interpreted the results and reviewed them with the patient.    DISCUSSION  During her visit we discussed and reviewed the following issues:  ADVANCED MATERNAL AGE  See prior M notes for a detailed review.  She did not desire invasive genetic testing.   She also declined screening and diagnostic testing for fetal aneuploidy.  She feels that she will not alter the management of her pregnancy even in the presence of a known  fetal aneuploidy.    MATERNAL SEIZURE DISORDER  See  prior MFM notes for a detailed review.    IMPRESSION:  IUP at 32w0d  AMA declines aneuploidy screening and invasive testing  Seizure disorder  History macrosomia     RECOMMENDATIONS:  Continue care with Dr. Villegas  Weekly NST's at 36 weeks.  Continue comanagement with neurology    Thank you for allowing me to participate in the care of your patient.  Please do not hesitate to contact me if additional questions or concerns arise.      Darian Jacobsen M.D.    20 minutes spent in review of records, patient consultation, documentation and coordination of care.  The relevant clinical matter(s) are summarized above.     Note to patient and family  The 21st Century Cures Act makes medical notes available to patients in the interest of transparency.  However, please be advised that this is a medical document.  It is intended as [id number] communication.  It is written and medical language may contain abbreviations or verbiage that are technical and unfamiliar.  It may appear blunt or direct.  Medical documents are intended to carry relevant information, facts as evident, and the clinical opinion of the practitioner.

## 2024-08-17 ENCOUNTER — LAB ENCOUNTER (OUTPATIENT)
Dept: LAB | Facility: HOSPITAL | Age: 39
End: 2024-08-17
Attending: Other
Payer: COMMERCIAL

## 2024-08-17 DIAGNOSIS — G40.909 SEIZURE DISORDER (HCC): ICD-10-CM

## 2024-08-17 DIAGNOSIS — Z79.899 ENCOUNTER FOR LONG-TERM (CURRENT) DRUG USE: ICD-10-CM

## 2024-08-17 DIAGNOSIS — Z34.93 ENCOUNTER FOR SUPERVISION OF NORMAL PREGNANCY IN THIRD TRIMESTER, UNSPECIFIED GRAVIDITY (HCC): ICD-10-CM

## 2024-08-17 LAB — T PALLIDUM AB SER QL IA: NONREACTIVE

## 2024-08-17 PROCEDURE — 80177 DRUG SCRN QUAN LEVETIRACETAM: CPT | Performed by: OTHER

## 2024-08-19 LAB — LEVETIRACETAM LVL: 9.7 UG/ML

## 2024-08-19 RX ORDER — FOLIC ACID 1 MG/1
TABLET ORAL
Qty: 60 TABLET | Refills: 0 | Status: SHIPPED | OUTPATIENT
Start: 2024-08-19

## 2024-08-19 NOTE — TELEPHONE ENCOUNTER
Requested Prescriptions     Pending Prescriptions Disp Refills    FOLIC ACID 1 MG Oral Tab [Pharmacy Med Name: Folic Acid 1 Mg Tab Amne] 180 tablet 0     Sig: TAKE ONE TABLET BY MOUTH TWICE DAILY     Last OV:     Next OV: With Neurology (Earline Palacios MD)  11/25/2024 at 10:00 AM     [place]/;

## 2024-08-20 ENCOUNTER — TELEPHONE (OUTPATIENT)
Dept: NEUROLOGY | Facility: CLINIC | Age: 39
End: 2024-08-20

## 2024-08-20 DIAGNOSIS — G40.909 SEIZURE DISORDER (HCC): Primary | ICD-10-CM

## 2024-08-20 NOTE — TELEPHONE ENCOUNTER
Patient called back stating she forgot to mention she was vomiting on the Friday, Sunday, Monday and Tuesday prior to having Keppra level done.     Dr Palacios notified of above and states she can keep same dose of Keppra and have repeat Keppra level in 1 week.    Patient notified of above and states she will have level drawn on 8/31 as it is difficult for her to do it during week when her children are in school. Lab order placed.

## 2024-08-20 NOTE — TELEPHONE ENCOUNTER
Spoke with patient who states she has not missed any doses of Levetiracetam but states the blood draw was about 11 minutes after she took her morning dose which is a lot sooner than previously. (States she usually has to wait awhile for blood draw but this time she didn't have to wait).      Per Dr Palacios message:    Her level is much lower than before.  Can we find out if she missed any doses, or took it differently this time (before vs after the blood draw as compared to last time)?

## 2024-08-21 ENCOUNTER — ROUTINE PRENATAL (OUTPATIENT)
Dept: OBGYN CLINIC | Facility: CLINIC | Age: 39
End: 2024-08-21
Payer: COMMERCIAL

## 2024-08-21 VITALS
WEIGHT: 132.38 LBS | HEART RATE: 77 BPM | BODY MASS INDEX: 23 KG/M2 | DIASTOLIC BLOOD PRESSURE: 62 MMHG | SYSTOLIC BLOOD PRESSURE: 98 MMHG

## 2024-08-21 DIAGNOSIS — Z34.93 ENCOUNTER FOR SUPERVISION OF NORMAL PREGNANCY IN THIRD TRIMESTER, UNSPECIFIED GRAVIDITY (HCC): Primary | ICD-10-CM

## 2024-08-21 LAB
BILIRUBIN: NEGATIVE
GLUCOSE (URINE DIPSTICK): NEGATIVE MG/DL
KETONES (URINE DIPSTICK): NEGATIVE MG/DL
MULTISTIX LOT#: ABNORMAL NUMERIC
NITRITE, URINE: NEGATIVE
OCCULT BLOOD: NEGATIVE
PH, URINE: 7.5 (ref 4.5–8)
PROTEIN (URINE DIPSTICK): NEGATIVE MG/DL
SPECIFIC GRAVITY: 1 (ref 1–1.03)
UROBILINOGEN,SEMI-QN: 1 MG/DL (ref 0–1.9)

## 2024-08-21 PROCEDURE — 81002 URINALYSIS NONAUTO W/O SCOPE: CPT | Performed by: OBSTETRICS & GYNECOLOGY

## 2024-08-21 PROCEDURE — 3074F SYST BP LT 130 MM HG: CPT | Performed by: OBSTETRICS & GYNECOLOGY

## 2024-08-21 PROCEDURE — 3078F DIAST BP <80 MM HG: CPT | Performed by: OBSTETRICS & GYNECOLOGY

## 2024-08-30 ENCOUNTER — HOSPITAL ENCOUNTER (OUTPATIENT)
Dept: PERINATAL CARE | Facility: HOSPITAL | Age: 39
Discharge: HOME | End: 2024-08-30
Attending: OBSTETRICS & GYNECOLOGY
Payer: COMMERCIAL

## 2024-08-30 VITALS
HEART RATE: 75 BPM | SYSTOLIC BLOOD PRESSURE: 111 MMHG | BODY MASS INDEX: 23 KG/M2 | DIASTOLIC BLOOD PRESSURE: 52 MMHG | WEIGHT: 133 LBS

## 2024-08-30 DIAGNOSIS — G40.909 SEIZURE DISORDER (HCC): ICD-10-CM

## 2024-08-30 DIAGNOSIS — O09.523 AMA (ADVANCED MATERNAL AGE) MULTIGRAVIDA 35+, THIRD TRIMESTER (HCC): Primary | ICD-10-CM

## 2024-08-30 DIAGNOSIS — G40.909 SEIZURE DISORDER DURING PREGNANCY IN THIRD TRIMESTER (HCC): ICD-10-CM

## 2024-08-30 DIAGNOSIS — O09.523 AMA (ADVANCED MATERNAL AGE) MULTIGRAVIDA 35+, THIRD TRIMESTER (HCC): ICD-10-CM

## 2024-08-30 DIAGNOSIS — O99.353 SEIZURE DISORDER DURING PREGNANCY IN THIRD TRIMESTER (HCC): ICD-10-CM

## 2024-08-30 PROCEDURE — 76819 FETAL BIOPHYS PROFIL W/O NST: CPT

## 2024-08-30 PROCEDURE — 76816 OB US FOLLOW-UP PER FETUS: CPT | Performed by: OBSTETRICS & GYNECOLOGY

## 2024-08-31 ENCOUNTER — LAB ENCOUNTER (OUTPATIENT)
Dept: LAB | Facility: HOSPITAL | Age: 39
End: 2024-08-31
Attending: Other
Payer: COMMERCIAL

## 2024-08-31 DIAGNOSIS — G40.909 SEIZURE DISORDER (HCC): ICD-10-CM

## 2024-08-31 PROCEDURE — 80177 DRUG SCRN QUAN LEVETIRACETAM: CPT | Performed by: OTHER

## 2024-09-02 LAB — LEVETIRACETAM LVL: 29.6 UG/ML

## 2024-09-04 ENCOUNTER — ROUTINE PRENATAL (OUTPATIENT)
Dept: OBGYN CLINIC | Facility: CLINIC | Age: 39
End: 2024-09-04
Payer: COMMERCIAL

## 2024-09-04 VITALS
WEIGHT: 136.63 LBS | HEART RATE: 76 BPM | SYSTOLIC BLOOD PRESSURE: 104 MMHG | BODY MASS INDEX: 23 KG/M2 | DIASTOLIC BLOOD PRESSURE: 65 MMHG

## 2024-09-04 DIAGNOSIS — Z34.93 ENCOUNTER FOR SUPERVISION OF NORMAL PREGNANCY IN THIRD TRIMESTER, UNSPECIFIED GRAVIDITY (HCC): Primary | ICD-10-CM

## 2024-09-04 PROCEDURE — 81002 URINALYSIS NONAUTO W/O SCOPE: CPT | Performed by: OBSTETRICS & GYNECOLOGY

## 2024-09-04 PROCEDURE — 3074F SYST BP LT 130 MM HG: CPT | Performed by: OBSTETRICS & GYNECOLOGY

## 2024-09-04 PROCEDURE — 3078F DIAST BP <80 MM HG: CPT | Performed by: OBSTETRICS & GYNECOLOGY

## 2024-09-15 DIAGNOSIS — G40.909 SEIZURE DISORDER (HCC): ICD-10-CM

## 2024-09-16 RX ORDER — FOLIC ACID 1 MG/1
TABLET ORAL
Qty: 60 TABLET | Refills: 0 | Status: SHIPPED | OUTPATIENT
Start: 2024-09-16

## 2024-09-20 ENCOUNTER — ROUTINE PRENATAL (OUTPATIENT)
Dept: OBGYN CLINIC | Facility: CLINIC | Age: 39
End: 2024-09-20
Payer: COMMERCIAL

## 2024-09-20 VITALS
BODY MASS INDEX: 24 KG/M2 | HEART RATE: 81 BPM | DIASTOLIC BLOOD PRESSURE: 67 MMHG | SYSTOLIC BLOOD PRESSURE: 108 MMHG | WEIGHT: 138 LBS

## 2024-09-20 DIAGNOSIS — Z34.83 ENCOUNTER FOR SUPERVISION OF OTHER NORMAL PREGNANCY IN THIRD TRIMESTER (HCC): Primary | ICD-10-CM

## 2024-09-20 LAB
APPEARANCE: CLEAR
GLUCOSE (URINE DIPSTICK): NEGATIVE MG/DL
KETONES (URINE DIPSTICK): NEGATIVE MG/DL
MULTISTIX LOT#: NORMAL NUMERIC
NITRITE, URINE: NEGATIVE
PROTEIN (URINE DIPSTICK): NEGATIVE MG/DL
URINE-COLOR: YELLOW

## 2024-09-20 PROCEDURE — 81002 URINALYSIS NONAUTO W/O SCOPE: CPT | Performed by: OBSTETRICS & GYNECOLOGY

## 2024-09-20 PROCEDURE — 3074F SYST BP LT 130 MM HG: CPT | Performed by: OBSTETRICS & GYNECOLOGY

## 2024-09-20 PROCEDURE — 3078F DIAST BP <80 MM HG: CPT | Performed by: OBSTETRICS & GYNECOLOGY

## 2024-09-21 ENCOUNTER — LAB ENCOUNTER (OUTPATIENT)
Dept: LAB | Facility: HOSPITAL | Age: 39
End: 2024-09-21
Attending: OBSTETRICS & GYNECOLOGY
Payer: COMMERCIAL

## 2024-09-21 DIAGNOSIS — Z34.93 ENCOUNTER FOR SUPERVISION OF NORMAL PREGNANCY IN THIRD TRIMESTER, UNSPECIFIED GRAVIDITY (HCC): ICD-10-CM

## 2024-09-21 DIAGNOSIS — Z79.899 ENCOUNTER FOR LONG-TERM (CURRENT) DRUG USE: ICD-10-CM

## 2024-09-21 LAB
DEPRECATED RDW RBC AUTO: 47 FL (ref 35.1–46.3)
ERYTHROCYTE [DISTWIDTH] IN BLOOD BY AUTOMATED COUNT: 12.9 % (ref 11–15)
HCT VFR BLD AUTO: 36.5 %
HGB BLD-MCNC: 12.3 G/DL
MCH RBC QN AUTO: 33.6 PG (ref 26–34)
MCHC RBC AUTO-ENTMCNC: 33.7 G/DL (ref 31–37)
MCV RBC AUTO: 99.7 FL
PLATELET # BLD AUTO: 161 10(3)UL (ref 150–450)
PLATELETS.RETICULATED NFR BLD AUTO: 14 % (ref 0–7)
RBC # BLD AUTO: 3.66 X10(6)UL
WBC # BLD AUTO: 7.2 X10(3) UL (ref 4–11)

## 2024-09-21 PROCEDURE — 80177 DRUG SCRN QUAN LEVETIRACETAM: CPT | Performed by: OTHER

## 2024-09-23 LAB — LEVETIRACETAM LVL: 32 UG/ML

## 2024-09-27 ENCOUNTER — APPOINTMENT (OUTPATIENT)
Dept: OBGYN CLINIC | Facility: HOSPITAL | Age: 39
End: 2024-09-27
Payer: COMMERCIAL

## 2024-09-27 ENCOUNTER — ROUTINE PRENATAL (OUTPATIENT)
Dept: OBGYN CLINIC | Facility: CLINIC | Age: 39
End: 2024-09-27
Payer: COMMERCIAL

## 2024-09-27 ENCOUNTER — NST DOCUMENTATION (OUTPATIENT)
Dept: OBGYN CLINIC | Facility: CLINIC | Age: 39
End: 2024-09-27

## 2024-09-27 ENCOUNTER — HOSPITAL ENCOUNTER (OUTPATIENT)
Facility: HOSPITAL | Age: 39
Discharge: HOME | End: 2024-09-27
Attending: OBSTETRICS & GYNECOLOGY | Admitting: OBSTETRICS & GYNECOLOGY
Payer: COMMERCIAL

## 2024-09-27 VITALS
BODY MASS INDEX: 24 KG/M2 | SYSTOLIC BLOOD PRESSURE: 113 MMHG | DIASTOLIC BLOOD PRESSURE: 68 MMHG | WEIGHT: 142.38 LBS | HEART RATE: 76 BPM

## 2024-09-27 VITALS — SYSTOLIC BLOOD PRESSURE: 121 MMHG | DIASTOLIC BLOOD PRESSURE: 64 MMHG | HEART RATE: 86 BPM

## 2024-09-27 DIAGNOSIS — O09.529 AMA (ADVANCED MATERNAL AGE) MULTIGRAVIDA 35+ (HCC): ICD-10-CM

## 2024-09-27 DIAGNOSIS — Z34.91 ENCOUNTER FOR SUPERVISION OF NORMAL PREGNANCY IN FIRST TRIMESTER, UNSPECIFIED GRAVIDITY (HCC): Primary | ICD-10-CM

## 2024-09-27 LAB
APPEARANCE: CLEAR
BILIRUBIN: NEGATIVE
GLUCOSE (URINE DIPSTICK): NEGATIVE MG/DL
KETONES (URINE DIPSTICK): NEGATIVE MG/DL
LEUKOCYTES: NEGATIVE
MULTISTIX LOT#: NORMAL NUMERIC
NITRITE, URINE: NEGATIVE
OCCULT BLOOD: NEGATIVE
PH, URINE: 7 (ref 4.5–8)
PROTEIN (URINE DIPSTICK): NEGATIVE MG/DL
SPECIFIC GRAVITY: 1.01 (ref 1–1.03)
URINE-COLOR: YELLOW
UROBILINOGEN,SEMI-QN: 0.2 MG/DL (ref 0–1.9)

## 2024-09-27 PROCEDURE — 3078F DIAST BP <80 MM HG: CPT | Performed by: OBSTETRICS & GYNECOLOGY

## 2024-09-27 PROCEDURE — 59025 FETAL NON-STRESS TEST: CPT | Performed by: OBSTETRICS & GYNECOLOGY

## 2024-09-27 PROCEDURE — 3074F SYST BP LT 130 MM HG: CPT | Performed by: OBSTETRICS & GYNECOLOGY

## 2024-09-27 PROCEDURE — 59025 FETAL NON-STRESS TEST: CPT

## 2024-09-27 PROCEDURE — 81002 URINALYSIS NONAUTO W/O SCOPE: CPT | Performed by: OBSTETRICS & GYNECOLOGY

## 2024-09-28 LAB — GROUP B STREP BY PCR FOR PCR OVT: NEGATIVE

## 2024-09-28 NOTE — NST
Nonstress Test   Patient: Sveta Mckeon    Gestation: 36w0d    Diagnosis from order:      Problem List:   Patient Active Problem List   Diagnosis    rubella  nonimmune    Seizure disorder during pregnancy in third trimester ([facility])    Vitamin D deficiency    Multigravida of advanced maternal age in first trimester ([facility])       NST:        2024   NST DOCUMENTATION   Variability 6-25 BPM   Accelerations Yes   Decelerations None   Baseline 125 BPM   Uterine Irritability No   Contractions Not present   Acoustic Stimulator No   Nonstress Test Interpretation Reactive   Nonstress Test Second Interpretation Reactive   NST Completed by ESE Cannon   Disposition Home    Testing Plan Weekly NST   Provider Notified MD Delma            I agree with the above evaluation. NST completed.  Toyin Nguyễn MD  2024  9:16 PM

## 2024-10-04 ENCOUNTER — ROUTINE PRENATAL (OUTPATIENT)
Dept: OBGYN CLINIC | Facility: CLINIC | Age: 39
End: 2024-10-04
Payer: COMMERCIAL

## 2024-10-04 ENCOUNTER — HOSPITAL ENCOUNTER (OUTPATIENT)
Facility: HOSPITAL | Age: 39
Discharge: HOME | End: 2024-10-04
Attending: OBSTETRICS & GYNECOLOGY | Admitting: OBSTETRICS & GYNECOLOGY
Payer: COMMERCIAL

## 2024-10-04 ENCOUNTER — APPOINTMENT (OUTPATIENT)
Dept: OBGYN CLINIC | Facility: HOSPITAL | Age: 39
End: 2024-10-04
Payer: COMMERCIAL

## 2024-10-04 VITALS
RESPIRATION RATE: 16 BRPM | TEMPERATURE: 99 F | HEART RATE: 64 BPM | SYSTOLIC BLOOD PRESSURE: 124 MMHG | DIASTOLIC BLOOD PRESSURE: 53 MMHG

## 2024-10-04 VITALS
WEIGHT: 145.38 LBS | DIASTOLIC BLOOD PRESSURE: 70 MMHG | HEART RATE: 69 BPM | BODY MASS INDEX: 25 KG/M2 | SYSTOLIC BLOOD PRESSURE: 112 MMHG

## 2024-10-04 DIAGNOSIS — Z34.90 PREGNANCY (HCC): ICD-10-CM

## 2024-10-04 DIAGNOSIS — Z34.83 ENCOUNTER FOR SUPERVISION OF OTHER NORMAL PREGNANCY IN THIRD TRIMESTER (HCC): Primary | ICD-10-CM

## 2024-10-04 LAB
APPEARANCE: CLEAR
BILIRUBIN: NEGATIVE
GLUCOSE (URINE DIPSTICK): NEGATIVE MG/DL
KETONES (URINE DIPSTICK): NEGATIVE MG/DL
MULTISTIX LOT#: ABNORMAL NUMERIC
NITRITE, URINE: NEGATIVE
PH, URINE: 8 (ref 4.5–8)
PROTEIN (URINE DIPSTICK): NEGATIVE MG/DL
SPECIFIC GRAVITY: 1.01 (ref 1–1.03)
URINE-COLOR: YELLOW
UROBILINOGEN,SEMI-QN: 0.2 MG/DL (ref 0–1.9)

## 2024-10-04 PROCEDURE — 59025 FETAL NON-STRESS TEST: CPT

## 2024-10-04 PROCEDURE — 3074F SYST BP LT 130 MM HG: CPT | Performed by: OBSTETRICS & GYNECOLOGY

## 2024-10-04 PROCEDURE — 3078F DIAST BP <80 MM HG: CPT | Performed by: OBSTETRICS & GYNECOLOGY

## 2024-10-04 PROCEDURE — 81002 URINALYSIS NONAUTO W/O SCOPE: CPT | Performed by: OBSTETRICS & GYNECOLOGY

## 2024-10-04 PROCEDURE — 59025 FETAL NON-STRESS TEST: CPT | Performed by: OBSTETRICS & GYNECOLOGY

## 2024-10-05 NOTE — NST
Nonstress Test   Patient: Sveta Mckeon    Gestation: 37w0d    NST:       Variability: Moderate           Accelerations: Yes           Decelerations: None            Baseline: 125 BPM           Uterine Irritability: No           Contractions: Irregular                    Contraction Frequency: x2 noted                   Acoustic Stimulator: No           Nonstress Test Interpretation: Reactive           Nonstress Test Second Interpretation: Reactive                     Additional Comments Comments:  @ 37w, scheduled NST for AMA. reactive, 2 contractions noted-pt unaware. Dr. Nash reviewed strip, ok for discharge. Sent home in stable condition accompanied by significant other with labor instructions.

## 2024-10-07 ENCOUNTER — TELEPHONE (OUTPATIENT)
Dept: OBGYN CLINIC | Facility: CLINIC | Age: 39
End: 2024-10-07

## 2024-10-07 ENCOUNTER — HOSPITAL ENCOUNTER (INPATIENT)
Facility: HOSPITAL | Age: 39
LOS: 1 days | Discharge: HOME | End: 2024-10-08
Attending: OBSTETRICS & GYNECOLOGY | Admitting: OBSTETRICS & GYNECOLOGY
Payer: COMMERCIAL

## 2024-10-07 PROBLEM — Z34.90 PREGNANCY (HCC): Status: ACTIVE | Noted: 2024-10-07

## 2024-10-07 LAB
ANTIBODY SCREEN: NEGATIVE
BASOPHILS # BLD AUTO: 0.03 X10(3) UL (ref 0–0.2)
BASOPHILS NFR BLD AUTO: 0.2 %
DEPRECATED RDW RBC AUTO: 46.4 FL (ref 35.1–46.3)
EOSINOPHIL # BLD AUTO: 0.04 X10(3) UL (ref 0–0.7)
EOSINOPHIL NFR BLD AUTO: 0.3 %
ERYTHROCYTE [DISTWIDTH] IN BLOOD BY AUTOMATED COUNT: 13.3 % (ref 11–15)
HCT VFR BLD AUTO: 35.6 %
HGB BLD-MCNC: 12.6 G/DL
IMM GRANULOCYTES # BLD AUTO: 0.05 X10(3) UL (ref 0–1)
IMM GRANULOCYTES NFR BLD: 0.4 %
LYMPHOCYTES # BLD AUTO: 1.55 X10(3) UL (ref 1–4)
LYMPHOCYTES NFR BLD AUTO: 12 %
MCH RBC QN AUTO: 33.7 PG (ref 26–34)
MCHC RBC AUTO-ENTMCNC: 35.4 G/DL (ref 31–37)
MCV RBC AUTO: 95.2 FL
MONOCYTES # BLD AUTO: 0.68 X10(3) UL (ref 0.1–1)
MONOCYTES NFR BLD AUTO: 5.3 %
NEUTROPHILS # BLD AUTO: 10.53 X10 (3) UL (ref 1.5–7.7)
NEUTROPHILS # BLD AUTO: 10.53 X10(3) UL (ref 1.5–7.7)
NEUTROPHILS NFR BLD AUTO: 81.8 %
PLATELET # BLD AUTO: 163 10(3)UL (ref 150–450)
RBC # BLD AUTO: 3.74 X10(6)UL
RH BLOOD TYPE: POSITIVE
T PALLIDUM AB SER QL IA: NONREACTIVE
WBC # BLD AUTO: 12.9 X10(3) UL (ref 4–11)

## 2024-10-07 PROCEDURE — 59400 OBSTETRICAL CARE: CPT | Performed by: OBSTETRICS & GYNECOLOGY

## 2024-10-07 RX ORDER — BISACODYL 10 MG
10 SUPPOSITORY, RECTAL RECTAL ONCE AS NEEDED
Status: DISCONTINUED | OUTPATIENT
Start: 2024-10-07 | End: 2024-10-08

## 2024-10-07 RX ORDER — IBUPROFEN 600 MG/1
600 TABLET, FILM COATED ORAL ONCE AS NEEDED
Status: COMPLETED | OUTPATIENT
Start: 2024-10-07 | End: 2024-10-07

## 2024-10-07 RX ORDER — SODIUM CHLORIDE, SODIUM LACTATE, POTASSIUM CHLORIDE, CALCIUM CHLORIDE 600; 310; 30; 20 MG/100ML; MG/100ML; MG/100ML; MG/100ML
INJECTION, SOLUTION INTRAVENOUS AS NEEDED
Status: DISCONTINUED | OUTPATIENT
Start: 2024-10-07 | End: 2024-10-07

## 2024-10-07 RX ORDER — MISOPROSTOL 200 UG/1
TABLET ORAL
Status: DISCONTINUED
Start: 2024-10-07 | End: 2024-10-07 | Stop reason: WASHOUT

## 2024-10-07 RX ORDER — LIDOCAINE HYDROCHLORIDE 10 MG/ML
30 INJECTION, SOLUTION EPIDURAL; INFILTRATION; INTRACAUDAL; PERINEURAL ONCE
Status: DISCONTINUED | OUTPATIENT
Start: 2024-10-07 | End: 2024-10-07

## 2024-10-07 RX ORDER — CARBOPROST TROMETHAMINE 250 UG/ML
INJECTION, SOLUTION INTRAMUSCULAR
Status: DISCONTINUED
Start: 2024-10-07 | End: 2024-10-07 | Stop reason: WASHOUT

## 2024-10-07 RX ORDER — ONDANSETRON 2 MG/ML
4 INJECTION INTRAMUSCULAR; INTRAVENOUS EVERY 6 HOURS PRN
Status: DISCONTINUED | OUTPATIENT
Start: 2024-10-07 | End: 2024-10-07

## 2024-10-07 RX ORDER — ACETAMINOPHEN 500 MG
1000 TABLET ORAL EVERY 6 HOURS PRN
Status: DISCONTINUED | OUTPATIENT
Start: 2024-10-07 | End: 2024-10-07

## 2024-10-07 RX ORDER — ACETAMINOPHEN 500 MG
1000 TABLET ORAL EVERY 6 HOURS PRN
Status: DISCONTINUED | OUTPATIENT
Start: 2024-10-07 | End: 2024-10-08

## 2024-10-07 RX ORDER — DOCUSATE SODIUM 100 MG/1
100 CAPSULE, LIQUID FILLED ORAL
Status: DISCONTINUED | OUTPATIENT
Start: 2024-10-07 | End: 2024-10-08

## 2024-10-07 RX ORDER — AMMONIA INHALANTS 0.04 G/.3ML
0.3 INHALANT RESPIRATORY (INHALATION) AS NEEDED
Status: DISCONTINUED | OUTPATIENT
Start: 2024-10-07 | End: 2024-10-08

## 2024-10-07 RX ORDER — TERBUTALINE SULFATE 1 MG/ML
0.25 INJECTION, SOLUTION SUBCUTANEOUS AS NEEDED
Status: DISCONTINUED | OUTPATIENT
Start: 2024-10-07 | End: 2024-10-07

## 2024-10-07 RX ORDER — DEXTROSE, SODIUM CHLORIDE, SODIUM LACTATE, POTASSIUM CHLORIDE, AND CALCIUM CHLORIDE 5; .6; .31; .03; .02 G/100ML; G/100ML; G/100ML; G/100ML; G/100ML
INJECTION, SOLUTION INTRAVENOUS CONTINUOUS
Status: DISCONTINUED | OUTPATIENT
Start: 2024-10-07 | End: 2024-10-07

## 2024-10-07 RX ORDER — METHYLERGONOVINE MALEATE 0.2 MG/ML
INJECTION INTRAVENOUS
Status: DISCONTINUED
Start: 2024-10-07 | End: 2024-10-07 | Stop reason: WASHOUT

## 2024-10-07 RX ORDER — CITRIC ACID/SODIUM CITRATE 334-500MG
30 SOLUTION, ORAL ORAL AS NEEDED
Status: DISCONTINUED | OUTPATIENT
Start: 2024-10-07 | End: 2024-10-07

## 2024-10-07 RX ORDER — SIMETHICONE 80 MG
80 TABLET,CHEWABLE ORAL 3 TIMES DAILY PRN
Status: DISCONTINUED | OUTPATIENT
Start: 2024-10-07 | End: 2024-10-08

## 2024-10-07 RX ORDER — TRANEXAMIC ACID 10 MG/ML
INJECTION, SOLUTION INTRAVENOUS
Status: DISCONTINUED
Start: 2024-10-07 | End: 2024-10-07 | Stop reason: WASHOUT

## 2024-10-07 RX ORDER — PRENATAL WITH FERROUS FUM AND FOLIC ACID 3080; 920; 120; 400; 22; 1.84; 3; 20; 10; 1; 12; 200; 27; 25; 2 [IU]/1; [IU]/1; MG/1; [IU]/1; MG/1; MG/1; MG/1; MG/1; MG/1; MG/1; UG/1; MG/1; MG/1; MG/1; MG/1
1 TABLET ORAL DAILY
Status: DISCONTINUED | OUTPATIENT
Start: 2024-10-07 | End: 2024-10-07

## 2024-10-07 RX ORDER — ACETAMINOPHEN 500 MG
500 TABLET ORAL EVERY 6 HOURS PRN
Status: DISCONTINUED | OUTPATIENT
Start: 2024-10-07 | End: 2024-10-07

## 2024-10-07 RX ORDER — CHOLECALCIFEROL (VITAMIN D3) 25 MCG
1 TABLET,CHEWABLE ORAL DAILY
Status: DISCONTINUED | OUTPATIENT
Start: 2024-10-07 | End: 2024-10-08

## 2024-10-07 RX ORDER — ACETAMINOPHEN 500 MG
500 TABLET ORAL EVERY 6 HOURS PRN
Status: DISCONTINUED | OUTPATIENT
Start: 2024-10-07 | End: 2024-10-08

## 2024-10-07 RX ORDER — IBUPROFEN 600 MG/1
600 TABLET, FILM COATED ORAL EVERY 6 HOURS
Status: DISCONTINUED | OUTPATIENT
Start: 2024-10-07 | End: 2024-10-08

## 2024-10-07 NOTE — PLAN OF CARE
Patient up to bathroom with assist x 2.  Voided 150. Patient transferred to mother/baby room 372 per wheelchair in stable condition with baby and personal belongings.  Accompanied by significant other and staff.  Report given to mother/baby Dhruv MULLIGAN.

## 2024-10-07 NOTE — DISCHARGE SUMMARY
[facility]  part of [facility]    Discharge Summary    Sveta Mckeon Patient Status:  Inpatient    3/1/1985 MRN [id number]   Location [facility] Attending Trell Shelley, DO   Hosp Day # 1       Delivering OB Clinician: Dr. Shelley    EDC: Estimated Date of Delivery: 10/25/24    Gestational Age: 37w3d    Antepartum complications:   Patient Active Problem List   Diagnosis    rubella  nonimmune    Seizure disorder during pregnancy in third trimester ([facility])    Vitamin D deficiency    Multigravida of advanced maternal age in first trimester ([facility])    Pregnancy ([facility])       Date of Delivery: 10/7/2024  Time of Delivery: 5:19 AM     Delivery Type: spontaneous vaginal delivery    Baby: Liveborn female   Information for the patient's :  Mike Girl [[id number]]   5 lb 14.2 oz (2.67 kg)   Apgars:  1 minute: 8   5 minutes: 9 10 minutes:       Intrapartum Complications: None    Admission date: 10/7/24  Discharge Date: 10-08-24    Hospital Course: 40 yo  @ 37w3d with labor.  No complications. Suspicion for Trisomy 21 pending labs. Also  seizure activity and transferred to [facility] on day 1. Routine postpartum care    Discharge Physical Exam:   /79 (BP Location: Right arm)   Pulse 65   Temp 98.5 °F (36.9 °C) (Oral)   Resp 15   Ht 5' 4\" (1.626 m)   Wt 156 lb (70.8 kg)   LMP 2024   Breastfeeding Yes   BMI 26.78 kg/m²   General appearance:  alert, appears stated age, cooperative and no distress  Abdominal: soft, non-tender, no rebound  Uterus: firm, nontender, below umbilicus  Pelvic: deferred  Extremities: Homans sign is negative, no sign of DVT    Discharged Condition: stable    Disposition: home      Plan:     Follow-up appointment in 6 weeks with Daphney

## 2024-10-07 NOTE — PROGRESS NOTES
Pt is a 39 year old female admitted to TR1/TR1-A.     Chief Complaint   Patient presents with    R/o Labor     Strong contractions since 0200        Pt is  37w3d intra-uterine pregnancy.  History obtained, consents signed. Oriented to room, staff, and plan of care.

## 2024-10-07 NOTE — PLAN OF CARE
Mom received into postpartum room in stable condition with all belongings and care partner. Assisted into bed in low locked position with side rails up and call light in reach. Admission fundal assessment completed, vitals stable, bleeding within normal limits. Report received from labor RN Ирина HERRERA Patient and family oriented to room, plan of care discussed.

## 2024-10-07 NOTE — H&P
[facility]  part of [facility]    History & Physical    Sveta Mckeon Patient Status:  Inpatient    3/1/1985 MRN [id number]   Location [facility] Attending Trell Shelley, DO   Hosp Day # 0 PCP Verna Vazquez MD     Date of Admission:  10/7/2024    SUBJECTIVE:    Sveta Mckeon is a 39 year old  at 37w3d with 10/25/2024, by Last Menstrual Period who presents c/o paniful UCs.  I spoke with Sveta through answering service and she was directed to [facility].  Upon arrivel cx 5/100/BBOW.  Admission orders were given. She made fast change to 10cm and had .      Lab Results   Component Value Date    GBS Negative 2024       Lab Results   Component Value Date    ABO BLOOD TYPE A 2024    RH BLOOD TYPE Positive 2024    HGB 12.6 10/07/2024    .0 10/07/2024    HCT 35.6 10/07/2024    Rubella IgG Equivocal (A) 2024    Treponemal Antibodies Nonreactive 2024    HIV Antigen Antibody Combo Non-Reactive 2024          Problem List:   Patient Active Problem List    Diagnosis    Pregnancy (HCC)    Multigravida of advanced maternal age in first trimester (HCC)     24  Maternal Fetal Medicine EFW 46%, 8  Normal level 2    Options of FTS, CVS, amnio, genetic counseling, Level 1 vs level 2 u/s reviewed.    If 35-40 y/o by EDC, then [  ] EM u/s at 32 wks [  ] weekly NST at 36          Vitamin D deficiency    Seizure disorder during pregnancy in third trimester (HCC)     On Keppra 750 mg BID - Sees Dr. Flores plans to transfer to Dr. Palacios. Last seizure 2023.        rubella  nonimmune     MMR PP       Obstetric History:   OB History    Para Term  AB Living   5 4 4     4   SAB IAB Ectopic Multiple Live Births         0 4      # Outcome Date GA Lbr Gilberto/2nd Weight Sex Type Anes PTL Lv   5 Current            4 Term 17 41w2d 05:10 / 00:22 10 lb 10.6 oz (4.835 kg) F NORMAL SPONT Local   MATILDE   3 Term 03/15/15 40w4d  9 lb 14 oz (4.479 kg) M NORMAL SPONT   MATILDE   2 Term 12 40w1d  8 lb 3 oz (3.714 kg) F NORMAL SPONT None N MATILDE   1 Term 09 40w3d  8 lb 8 oz (3.856 kg) F NORMAL SPONT None N MATILDE     Past Medical History:   Past Medical History:    Environmental allergies    History of pregnancy        Seasonal allergies    Seizure disorder (HCC)    last seizure 2015     Past Social History:   Past Surgical History:   Procedure Laterality Date    Hernia surgery      Umbilical          Ventral hernia repair  3/18/2016    (Supraumbilical/umbilical)     Family History:   Family History   Problem Relation Age of Onset    Diabetes Maternal Grandmother     Pancreatic Cancer Paternal Grandmother     Cancer Paternal Grandmother         unknown type     Social History:   Social History     Tobacco Use    Smoking status: Never     Passive exposure: Never    Smokeless tobacco: Never   Substance Use Topics    Alcohol use: Never       Home Meds:   Medications Prior to Admission   Medication Sig Dispense Refill Last Dose    FOLIC ACID 1 MG Oral Tab TAKE ONE TABLET BY MOUTH TWICE DAILY 60 tablet 0 10/6/2024    LEVETIRACETAM 750 MG Oral Tab TAKE ONE TABLET BY MOUTH TWICE DAILY 60 tablet 2 10/6/2024    Prenatal 27-1 MG Oral Tab daily.   10/6/2024    [] ondansetron 4 MG Oral Tablet Dispersible Take 1 tablet (4 mg total) by mouth every 4 (four) hours as needed for Nausea. 10 tablet 0     mometasone 0.1 % External Ointment Apply 1 Application topically daily. 30 g 1      Allergies: No Known Allergies    OBJECTIVE:    Temp:  [98.1 °F (36.7 °C)] 98.1 °F (36.7 °C)  Pulse:  [73] 73  Resp:  [16] 16  BP: (119)/(64) 119/64    General: Alert and Oriented, mild distress  Abdomen: Soft, Gravid Uterus      Lab Review:  Results for orders placed or performed during the hospital encounter of 10/07/24   CBC With Differential With Platelet   Result Value Ref Range    WBC 12.9 (H) 4.0 - 11.0 x10(3) uL    RBC  3.74 (L) 3.80 - 5.30 x10(6)uL    HGB 12.6 12.0 - 16.0 g/dL    HCT 35.6 35.0 - 48.0 %    MCV 95.2 80.0 - 100.0 fL    MCH 33.7 26.0 - 34.0 pg    MCHC 35.4 31.0 - 37.0 g/dL    RDW-SD 46.4 (H) 35.1 - 46.3 fL    RDW 13.3 11.0 - 15.0 %    .0 150.0 - 450.0 10(3)uL    Neutrophil Absolute Prelim 10.53 (H) 1.50 - 7.70 x10 (3) uL    Neutrophil Absolute 10.53 (H) 1.50 - 7.70 x10(3) uL    Lymphocyte Absolute 1.55 1.00 - 4.00 x10(3) uL    Monocyte Absolute 0.68 0.10 - 1.00 x10(3) uL    Eosinophil Absolute 0.04 0.00 - 0.70 x10(3) uL    Basophil Absolute 0.03 0.00 - 0.20 x10(3) uL    Immature Granulocyte Absolute 0.05 0.00 - 1.00 x10(3) uL    Neutrophil % 81.8 %    Lymphocyte % 12.0 %    Monocyte % 5.3 %    Eosinophil % 0.3 %    Basophil % 0.2 %    Immature Granulocyte % 0.4 %        ASSESSMENT:    Patient is a  at 37w3d presents with labor       PLAN:    Admit  Reassuring EFM  S/p   Routine postpartum care  Patient requesting to take her home yair Shelley DO  10/7/2024  5:31 AM

## 2024-10-07 NOTE — L&D DELIVERY NOTE
Mike, Girl [[id number]]      Labor Events     labor?: No   steroids?: None  Antibiotics received during labor?: No  Rupture date/time: 10/7/2024 0518     Rupture type: SROM  Labor type: Spontaneous Onset of Labor  Augmentation: None  Intrapartum & labor complications: None       Labor Length    1st stage: 2h 18m  2nd stage: 0h 01m  3rd stage: 0h 05m       Labor Event Times    Labor onset date/time: 10/7/2024 0300  Dilation complete date/time: 10/7/2024 0518        Presentation    Presentation: Vertex  Position: Right Occiput Anterior       Operative Delivery    Operative Vaginal Delivery: N/A                Shoulder Dystocia    Shoulder Dystocia: N/A       Anesthesia    Method: None              [place] Delivery      Head delivery date/time: 10/7/2024 05:19:32   Delivery date/time:  10/7/24 05:19:43   Delivery type: Normal spontaneous vaginal delivery    Details:     Delivery location: delivery room  Delivery Room Temperature: 69       Delivery Providers    Delivering Clinician: Trell Shelley DO   Delivery personnel:  Provider Role   Rocío Carlisle, ESE Baby Nurse   Celeste Payne, RN Delivery Nurse   Arlen Paredes Surgical Tech             Cord    Vessels: 3 Vessels  Complications: Nuchal  # of loops: 1  Timed cord clamping: Yes  Time in sec: 125  Cord blood disposition: to lab  Gases sent?: No       Resuscitation    Method: None       [place] Measurements      Weight: 2670 g 5 lb 14.2 oz Length: 50 cm     Head circum.: 32 cm              Placenta    Date/time: 10/7/2024 0525  Removal: Spontaneous  Appearance: Intact  Disposition: Discarded       Apgars    Living status: Living   Apgar Scoring Key:    0 1 2    Skin color Blue or pale Acrocyanotic Completely pink    Heart rate Absent <100 bpm >100 bpm    Reflex irritability No response Grimace Cry or active withdrawal    Muscle tone Limp Some flexion Active motion    Respiratory effort Absent Weak cry; hypoventilation  Good, crying              1 Minute:  5 Minute:  10 Minute:  15 Minute:  20 Minute:      Skin color: 0  1       Heart rate: 2  2       Reflex irritablity: 2  2       Muscle tone: 2  2       Respiratory effort: 2  2       Total: 8  9          Apgars assigned by: JET STEPHEN       Skin to Skin    Skin to skin initiated date/time: 10/7/2024 0519  Skin to skin with: Mother       Vaginal Count    Initial count RN: Celeste Payne RN  Initial count Tech: Paredes Arlen   Blaine   Sharps    Initial counts 10   0    Final counts 10   0    Final count RN: Celeste Payne RN  Final count MD: Trell Shelley DO       Lacerations    Episiotomy: None  Perineal lacerations: None      Vaginal laceration?: No      Cervical laceration?: No    Clitoral laceration?: No    Quantitative blood loss (mL): 50              [facility]  part of [facility]    Vaginal Delivery Note    Sveta Mckeon Patient Status:  Inpatient    3/1/1985 MRN [id number]   Location [facility] CENTER Attending Trell Shelley DO   Hosp Day # 0 PCP Verna Vazquez MD     Delivery     Infant  Date of Delivery: 10/7/2024    Time of Delivery: 5:19 AM   Delivery Type: Normal spontaneous vaginal delivery     Infant Sex  Information for the patient's :  Mike Girl [[id number]]   female     Infant Birthweight  Information for the patient's :  Leia Mckeon [[id number]]   5 lb 14.2 oz (2.67 kg)      Presentation Vertex [1]   Position Right [3]  Occiput [1]  Anterior [1]     Apgars:  1 minute: 8                5 minutes: 9                      Neonatologist Present: no      Placenta  Date/Time of Delivery: 10/7/2024  5:25 AM    Delivery: spontaneous  Placenta to Pathology: no      Cord Gases Submitted: no  Cord Complications: single nuchal    Maternal Anesthesia: none     Episiotomy/Laceration Repair  none    Delivery Complications  none    Quantitative Blood Loss (mL) 50       EBL:  300  cc    Delivery Comment:     Came to bedside where patient involuntarily pushing.  Small amt of clear fluid noted on perineum and before I could examine patient, she started to crown.  Baby delivered VANE. Tight nuchal x 1.  Unable to quickly reduce due to patient pushing.  Shoulders delivered atraumatically followed by the trunk and LE. Nasopharyngeally bulb suctioned at the perineum and infant vigorous. Cord clamped and cut after >30sec delay. Baby placed on mom's chest with the awaiting nursing personal. Placenta delivered by controlled cord traction intact with a 3 VC and intact. Normal uterine tone with oxytocin infusion. Repair as above with good hemostasis and anatomic re approximation. Sphincter intact.  Rectum and vagina clear of sponges. [place] and patient stable in the delivery room with nursing present. Sponge and needle counts verified.        Trell Shelley DO   10/7/2024  5:37 AM

## 2024-10-08 ENCOUNTER — TELEPHONE (OUTPATIENT)
Dept: OBGYN CLINIC | Facility: CLINIC | Age: 39
End: 2024-10-08

## 2024-10-08 VITALS
DIASTOLIC BLOOD PRESSURE: 79 MMHG | RESPIRATION RATE: 15 BRPM | BODY MASS INDEX: 26.63 KG/M2 | TEMPERATURE: 99 F | HEART RATE: 65 BPM | WEIGHT: 156 LBS | SYSTOLIC BLOOD PRESSURE: 118 MMHG | HEIGHT: 64 IN

## 2024-10-08 LAB
BASOPHILS # BLD AUTO: 0.05 X10(3) UL (ref 0–0.2)
BASOPHILS NFR BLD AUTO: 0.6 %
DEPRECATED RDW RBC AUTO: 49.2 FL (ref 35.1–46.3)
EOSINOPHIL # BLD AUTO: 0.09 X10(3) UL (ref 0–0.7)
EOSINOPHIL NFR BLD AUTO: 1 %
ERYTHROCYTE [DISTWIDTH] IN BLOOD BY AUTOMATED COUNT: 13.8 % (ref 11–15)
HCT VFR BLD AUTO: 33.2 %
HGB BLD-MCNC: 11.2 G/DL
IMM GRANULOCYTES # BLD AUTO: 0.03 X10(3) UL (ref 0–1)
IMM GRANULOCYTES NFR BLD: 0.3 %
LYMPHOCYTES # BLD AUTO: 2.29 X10(3) UL (ref 1–4)
LYMPHOCYTES NFR BLD AUTO: 26.5 %
MCH RBC QN AUTO: 33.3 PG (ref 26–34)
MCHC RBC AUTO-ENTMCNC: 33.7 G/DL (ref 31–37)
MCV RBC AUTO: 98.8 FL
MONOCYTES # BLD AUTO: 0.55 X10(3) UL (ref 0.1–1)
MONOCYTES NFR BLD AUTO: 6.4 %
NEUTROPHILS # BLD AUTO: 5.63 X10 (3) UL (ref 1.5–7.7)
NEUTROPHILS # BLD AUTO: 5.63 X10(3) UL (ref 1.5–7.7)
NEUTROPHILS NFR BLD AUTO: 65.2 %
PLATELET # BLD AUTO: 144 10(3)UL (ref 150–450)
RBC # BLD AUTO: 3.36 X10(6)UL
WBC # BLD AUTO: 8.6 X10(3) UL (ref 4–11)

## 2024-10-08 PROCEDURE — 3E0234Z INTRODUCTION OF SERUM, TOXOID AND VACCINE INTO MUSCLE, PERCUTANEOUS APPROACH: ICD-10-PCS | Performed by: OBSTETRICS & GYNECOLOGY

## 2024-10-08 NOTE — TELEPHONE ENCOUNTER
6 weeks should be fine unless she wants to be seen sooner; she did great with her delivery and no lacerations, so unless she is needing emotional support sooner, 6 weeks should be okay

## 2024-10-08 NOTE — PLAN OF CARE

## 2024-10-08 NOTE — TELEPHONE ENCOUNTER
10-07- probable Trisomy- 21 pending lab. Needs 6 week follow up with Dr. Shelley. I would like RN to arrange this so she does not have to deal with scheduling office. She has high stressors now. Also please ask Dr. Shelley if he needs to see her sooner that 6 weeks. Physically, patient is fine. Thank you!

## 2024-10-08 NOTE — TELEPHONE ENCOUNTER
Patient accepts postpartum appointment with Dr. Shelley on 11/19/24. Advised patient to please call us if she has any additional questions or concerns prior to this appointment.

## 2024-10-08 NOTE — CM/SW NOTE
SW received MD order for follow-up- baby will be transferred to [facility].    Per OB/Gyn note, baby suspicion for Trisomy -21 /seizure activity     SW met with patient/spouse/mother/RN bedside.  SW confirmed face sheet contact as correct.    Baby boy/girl name: Ana Rosa  Date & time of delivery: 10/7/2024 5:19 AM  Delivery method: Normal spontaneous vaginal delivery   Siblings age: 2 other children    Patient employed: No  Length of maternity leave: N/A    Father of baby employed: Yes  Length of paternity leave: N/A    Breast or formula feed:  Breast    Pediatrician: N/A    Infant Insurance:[employer] [facility] contacted: N/A    Mental Health History: N/A    Medications: N/A    Therapist: N/A    Psychiatrist: N/A    Patient tearful- KRISHNA and RN offered emotional support.    SW provided pt with PMAD resources.    Other resources provided:    Assurance that SW at [facility] will follow up with her.    Patient support system: , mother    Discharge order in- baby Ana Rosa will be transferred today.    Carley Carter MSW, LSW [id number]

## 2024-10-08 NOTE — PROGRESS NOTES
FOCUS: MOM DISCHARGE    D: DISCHARGE ORDERS RECEIVED FROM PROVIDER.    A: POSTPARTUM DISCHARGE AVS'S GIVEN AND REVIEWED EXTENSIVELY, PRESCRIPTIONS/MEDICATIONS REVIEWED, PATIENT VOCALIZED UNDERSTANDING.  PATIENT INFORMED WHEN TO MAKE FOLLOW UP APPOINTMENTS WITH PROVIDER.    R: DISCHARGED IN STABLE CONDITION VIA AMBULATORY. PATIENTS HEADING TO [facility] WHERE INFANT IS BEING TRANSFERRED.

## 2024-10-08 NOTE — PROGRESS NOTES
Post-Partum Note   10/8/2024, 8:10 AM    Subjective:  Patient doing well. Pain- none.  Lochia is small.  She reports she does tolerate a regular diet.  She denies headache, fever, chest pain, shortness of breath, and leg pain.  She has ambulated and denies lightheadedness. The patient is breast  feeding  Patient had infant girl- Ana Rosa. Suspicion for Trisomy -21 and also some seizure activity. I just received word that baby will be transferred to [facility].     Objective:  Vitals:    10/07/24 1400 10/07/24 1750 10/07/24 2124 10/08/24 0747   BP: 108/69 116/71 122/76 118/79   BP Location: Right arm Right arm Right arm Right arm   Pulse: 57 65 63 65   Resp: 20 12 16 15   Temp: 98.5 °F (36.9 °C) 97.7 °F (36.5 °C) 98.3 °F (36.8 °C) 98.5 °F (36.9 °C)   TempSrc: Oral Oral Oral Oral   Weight:       Height:           Intake/Output Summary (Last 24 hours) at 10/8/2024 0810  Last data filed at 10/7/2024 1000  Gross per 24 hour   Intake --   Output 300 ml   Net -300 ml         PE:  General: A&Ox3. NAD  Abdomen:Soft and nontender; fundus firm below umbilicus and nontender   Extremities:  no calf tenderness    Data:   Recent Labs     10/07/24  0454 10/08/24  0517   WBC 12.9* 8.6   HGB 12.6 11.2*   HCT 35.6 33.2*       Assessment/Plan:  39 year oldyo  , s/p spontaneous vaginal, PPD# 1      Disposition: Continue routine PP care- Plan on DC home PP day 1      Jose Nash,  10/8/2024 8:10 AM

## 2024-10-14 DIAGNOSIS — G40.909 SEIZURE DISORDER (HCC): ICD-10-CM

## 2024-10-14 RX ORDER — FOLIC ACID 1 MG/1
TABLET ORAL
Qty: 60 TABLET | Refills: 0 | Status: SHIPPED | OUTPATIENT
Start: 2024-10-14

## 2024-10-15 ENCOUNTER — NST DOCUMENTATION (OUTPATIENT)
Dept: OBGYN CLINIC | Facility: CLINIC | Age: 39
End: 2024-10-15

## 2024-10-15 DIAGNOSIS — G40.909 SEIZURE DISORDER (HCC): ICD-10-CM

## 2024-10-22 DIAGNOSIS — G40.909 NONINTRACTABLE EPILEPSY WITHOUT STATUS EPILEPTICUS, UNSPECIFIED EPILEPSY TYPE (HCC): ICD-10-CM

## 2024-10-22 RX ORDER — LEVETIRACETAM 750 MG/1
750 TABLET ORAL 2 TIMES DAILY
Qty: 60 TABLET | Refills: 0 | Status: SHIPPED | OUTPATIENT
Start: 2024-10-22

## 2024-10-22 NOTE — TELEPHONE ENCOUNTER
Medication: Levetiracetam 750mg     Date of last refill: 7/29/24 (#60/2)  [place]      Last office visit: 7/22/24  Due back to clinic per last office note:  4m  Date next office visit scheduled:    Future Appointments   Date Time Provider Department Center   11/19/2024 10:50 AM Trell Shelley DO [facility]   11/25/2024 10:00 AM Earline Palacios MD [facility]           Last OV note recommendation:    Epilepsy, unspecified, possibly generalized.  Well controlled but 27 weeks pregnant and thus high risk with threat to function and needs intensive monitoring.  Continue  mg BID, and FA 1 mg daily.  However, in future LEV may need to be increased, since level may drop eleanor by 3rd trimester.  Check monthly levels.     We discussed medication side effects and activity precautions. We discussed symptoms that would warrant urgent/emergent evaluation. Patient verbalized understanding and agreement.     Return in about 4 months (around 11/22/2024).

## 2024-11-02 ENCOUNTER — TELEPHONE (OUTPATIENT)
Dept: OBGYN UNIT | Facility: HOSPITAL | Age: 39
End: 2024-11-02

## 2024-11-02 NOTE — PROGRESS NOTES
Reviewed self care with mom, she verbalizes understanding of instruction reviewed. Encouraged to follow up with MD's as directed with questions/concerns.

## 2024-11-07 ENCOUNTER — APPOINTMENT (OUTPATIENT)
Dept: CT IMAGING | Facility: HOSPITAL | Age: 39
End: 2024-11-07
Attending: EMERGENCY MEDICINE
Payer: COMMERCIAL

## 2024-11-07 ENCOUNTER — HOSPITAL ENCOUNTER (EMERGENCY)
Facility: HOSPITAL | Age: 39
Discharge: HOME | End: 2024-11-07
Attending: EMERGENCY MEDICINE
Payer: COMMERCIAL

## 2024-11-07 VITALS
DIASTOLIC BLOOD PRESSURE: 61 MMHG | RESPIRATION RATE: 12 BRPM | SYSTOLIC BLOOD PRESSURE: 95 MMHG | TEMPERATURE: 98 F | HEART RATE: 48 BPM | OXYGEN SATURATION: 98 %

## 2024-11-07 DIAGNOSIS — F43.0 STRESS REACTION: ICD-10-CM

## 2024-11-07 DIAGNOSIS — G40.919 BREAKTHROUGH SEIZURE (HCC): Primary | ICD-10-CM

## 2024-11-07 LAB — GLUCOSE BLDC GLUCOMTR-MCNC: 93 MG/DL (ref 70–99)

## 2024-11-07 PROCEDURE — 70450 CT HEAD/BRAIN W/O DYE: CPT | Performed by: EMERGENCY MEDICINE

## 2024-11-07 PROCEDURE — 82962 GLUCOSE BLOOD TEST: CPT

## 2024-11-07 PROCEDURE — 96372 THER/PROPH/DIAG INJ SC/IM: CPT

## 2024-11-07 PROCEDURE — 99284 EMERGENCY DEPT VISIT MOD MDM: CPT

## 2024-11-07 RX ORDER — HYDROXYZINE HYDROCHLORIDE 25 MG/1
TABLET, FILM COATED ORAL EVERY 6 HOURS PRN
Qty: 15 TABLET | Refills: 0 | Status: SHIPPED | OUTPATIENT
Start: 2024-11-07 | End: 2024-11-09

## 2024-11-07 RX ORDER — LORAZEPAM 2 MG/ML
2 INJECTION INTRAMUSCULAR ONCE
Status: COMPLETED | OUTPATIENT
Start: 2024-11-07 | End: 2024-11-07

## 2024-11-08 ENCOUNTER — TELEPHONE (OUTPATIENT)
Dept: OBGYN CLINIC | Facility: CLINIC | Age: 39
End: 2024-11-08

## 2024-11-08 NOTE — ED PROVIDER NOTES
Patient Seen in: [facility] Emergency Department    History     Chief Complaint   Patient presents with    Altered Mental Status    Seizures       HPI    The patient presents to the ED with family for altered mental status since last night.  Family states that she is not talking and seem somewhat confused.  They state that she is under an excessive amount of stress recently.  Gave birth a month ago and her baby has been in the NICU for the past month.  The baby came home today.  Family states that the patient had 3-4 seizures last night and does have a history of epilepsy.  This is usually fairly well-controlled and family thinks that the increase stress may have caused the seizures.  No seizures today.  No other history.    History reviewed.   Past Medical History:    Environmental allergies    History of pregnancy        Seasonal allergies    Seizure disorder (HCC)    last seizure 2015       History reviewed.   Past Surgical History:   Procedure Laterality Date    Hernia surgery      Umbilical          Ventral hernia repair  3/18/2016    (Supraumbilical/umbilical)         Medications :  Prescriptions Prior to Admission[1]     Family History   Problem Relation Age of Onset    Diabetes Maternal Grandmother     Pancreatic Cancer Paternal Grandmother     Cancer Paternal Grandmother         unknown type       Smoking Status:   Social History     Socioeconomic History    Marital status:     Number of children: 3   Occupational History    Occupation: dental assistant   Tobacco Use    Smoking status: Never     Passive exposure: Never    Smokeless tobacco: Never   Vaping Use    Vaping status: Never Used   Substance and Sexual Activity    Alcohol use: Never    Drug use: Never   Other Topics Concern    Caffeine Concern No    Exercise No    Reaction to local anesthetic No       Constitutional and vital signs reviewed.      Social History and Family History elements reviewed from today, pertinent  positives to the presenting problem noted.    Physical Exam     ED Triage Vitals [11/07/24 2011]   /73   Pulse 64   Resp 16   Temp 98.4 °F (36.9 °C)   Temp src Temporal   SpO2 99 %   O2 Device None (Room air)       All measures to prevent infection transmission during my interaction with the patient were taken. Handwashing was performed prior to and after the exam.  Stethoscope and any equipment used during my examination was cleaned with super sani-cloth germicidal wipes following the exam.     Physical Exam  Vitals and nursing note reviewed.   Constitutional:       General: She is not in acute distress.     Appearance: She is well-developed.   HENT:      Head: Normocephalic and atraumatic.   Eyes:      General:         Right eye: No discharge.         Left eye: No discharge.      Conjunctiva/sclera: Conjunctivae normal.   Neck:      Trachea: No tracheal deviation.   Cardiovascular:      Rate and Rhythm: Normal rate.   Pulmonary:      Effort: Pulmonary effort is normal. No respiratory distress.      Breath sounds: No stridor.   Abdominal:      General: There is no distension.      Palpations: Abdomen is soft.   Musculoskeletal:         General: No deformity.   Skin:     General: Skin is warm and dry.   Neurological:      General: No focal deficit present.      Mental Status: She is alert.      Comments: Not answering questions, no eye contact   Psychiatric:      Comments: Not responding to questioning         ED Course        Labs Reviewed   POCT GLUCOSE - Normal       As Interpreted by me    Imaging Results Available and Reviewed while in ED: CT BRAIN OR HEAD (CPT=70450)    Result Date: 11/7/2024  CONCLUSION:   1. No acute intracranial hemorrhage, midline shift, mass effect, or hydrocephalus.  2. No transcortical area of hypoattenuation to suggest an acute infarct.  If there is clinical concern for an acute infarct or to further evaluate for a seizure focus, consider further evaluation with an MRI of the  brain. 3. Lesser incidental findings described above.    Dictated by (CST): Uri Ramirez MD on 11/07/2024 at 10:00 PM     Finalized by (CST): Uri Ramirez MD on 11/07/2024 at 10:03 PM         ED Medications Administered:   Medications   LORazepam (Ativan) 2 mg/mL injection 2 mg (2 mg Intramuscular Given 11/7/24 2056)   LORazepam (Ativan) 2 mg/mL injection 2 mg (2 mg Intramuscular Given 11/7/24 2240)         MDM     Vitals:    11/07/24 2011 11/07/24 2030 11/07/24 2145 11/07/24 2230   BP: 114/73 113/76 98/57 95/61   Pulse: 64 56 51 (!) 48   Resp: 16 12 12 12   Temp: 98.4 °F (36.9 °C)      TempSrc: Temporal      SpO2: 99% 98% 96% 98%     *I personally reviewed and interpreted all ED vitals.    Pulse Ox: 98%, Room air, Normal     Monitor Interpretation:   normal sinus rhythm as interpreted by me.  The cardiac monitor was ordered to monitor heart rate.    Differential Diagnosis/ Diagnostic Considerations: Stress reaction, breakthrough seizures, ICH, other    Complicating Factors: The patient already has does not have any pertinent problems on file. to contribute to the complexity of this ED evaluation.    Medical Decision Making  Patient presents to the ED with altered mental status and several seizures last night.  No seizure activity today.  Patient not responding to questioning on my initial evaluation.  Possible stress reaction.  She was given Ativan and improved significantly.  Now patient somewhat sleepy on reevaluation but easily arousable and now responding to questioning normally.  CT brain without intracranial injury.  Family comfortable taking the patient home and will follow-up outpatient.    Problems Addressed:  Breakthrough seizure (HCC): chronic illness or injury with exacerbation, progression, or side effects of treatment  Stress reaction: acute illness or injury    Amount and/or Complexity of Data Reviewed  Labs: ordered. Decision-making details documented in ED Course.  Radiology: ordered and  independent interpretation performed. Decision-making details documented in ED Course.     Details: I personally reviewed the patient's CT brain and noted no ICH    Risk  Prescription drug management.        Condition upon leaving the department: Stable    Disposition and Plan     Clinical Impression:  1. Breakthrough seizure (HCC)    2. Stress reaction        Disposition:  Discharge    Follow-up:  Verna Vazquez MD  [address] [place] [address]  [address]  [facility] [id number]  [phone]    Schedule an appointment as soon as possible for a visit in 3 day(s)        Medications Prescribed:  Discharge Medication List as of 11/7/2024 10:42 PM        START taking these medications    Details   hydrOXYzine 25 MG Oral Tab Take 1-2 tablets (25-50 mg total) by mouth every 6 (six) hours as needed for Anxiety., Normal, Disp-15 tablet, R-0                              [1] (Not in a hospital admission)

## 2024-11-08 NOTE — TELEPHONE ENCOUNTER
Postpartum- patient delivered 10/7/24 with Dr. Shelley.    Patient with hx epilepsy. She was evaluated twice in the ER for multiple seizures and altered mental status- once on 10/28, and again on . Patient does see a neurologist.    Patient had CT of brain and head completed yesterday which was clear.    Patient's mother and sister (verbal release on-file) are now calling. They are extremely concerned about Sveta. She has not spoken in 2 days and is not making eye contact with her family members. She is not eating today. When she was discharged from the hospital after delivery, she was waiting to be picked up at the curb. Sveta's  asked her where her daughter was. Sveta replied, \"What daughter?\" Multiple times, she has denied that she has a . She is not breastfeeding. Yesterday, she was pacing back and forth around her house for most of the day.    Advised patient's family that I am concerned that this patient may be a harm to herself or others. Patient's family has  the patient from her children. Advised patient that I am going to call patient to assess for depression s/s; they stated that the patient does not have her phone with her currently.    Advised patient that we recommend an immediate evaluation in the ER in order to assess for possible postpartum depression and/or psychosis. In the ER, providers can also perform another neuro exam. Again stressed the importance of keeping this patient safe so she is not at risk of harming herself or others. Advised patient's family that the ER may recommend an inpatient stay in psych facility if deemed necessary.    Patient's family verbalized understanding and will take her back to the ER.    To on-call doctor and delivering doctor as LISSETTE.

## 2024-11-08 NOTE — ED INITIAL ASSESSMENT (HPI)
39y F to ED via personal car with family members for AMS. Patient was witnessed to have 3-4 seizures overnight last night (has history of epilepsy). Patient has not been her norm all day today, according to . She arrives to ED nonverbal, confused, not following commands well. Under a lot of stress right now, has a baby in NICU. Not eating and drinking well today. Accucheck 93 in ED. Is 4.5 weeks postpartum.

## 2024-11-08 NOTE — TELEPHONE ENCOUNTER
Patient's sibling calling on her behalf. Her family is very concerned. She delivered on 10/7 and her baby stayed in the NICU until 11/7. Patient was seen in the ER yesterday for seizures. Family is very concerned because patient is not asking for her child and is not talking. They have taken patient to her mother's home (who is listed on the ROSITA). Please call her mom Annalisa at [phone]. Mom needs an . Sister who called is happy to fill that need.

## 2024-11-09 PROBLEM — F43.0 ACUTE STRESS DISORDER: Status: ACTIVE | Noted: 2024-11-09

## 2024-11-09 NOTE — TELEPHONE ENCOUNTER
Reviewed emergency department notes dated 11/8/24. This patient will be transferred to inpatient psychiatric facility, diagnosis: postpartum depression.     To on-call and delivering doctors as FYI.

## 2024-11-14 DIAGNOSIS — G40.909 SEIZURE DISORDER (HCC): ICD-10-CM

## 2024-11-14 RX ORDER — FOLIC ACID 1 MG/1
1 TABLET ORAL DAILY
Qty: 30 TABLET | Refills: 11 | Status: SHIPPED | OUTPATIENT
Start: 2024-11-14 | End: 2025-11-09

## 2024-11-14 NOTE — TELEPHONE ENCOUNTER
Medication: FOLIC ACID 1 MG Oral Tab      Date of last refill: 10/14/2024 (#60/0)  [place]     Last office visit: 07/22/2024  Due back to clinic per last office note:  Around 11/22/2024  Date next office visit scheduled:    Future Appointments   Date Time Provider Department Center   11/19/2024 10:50 AM Trell Shelley DO [facility]   11/25/2024 10:00 AM Earline Palacios MD [facility]           Last OV note recommendation:    Epilepsy, unspecified, possibly generalized.  Well controlled but 27 weeks pregnant and thus high risk with threat to function and needs intensive monitoring.  Continue  mg BID, and FA 1 mg daily.  However, in future LEV may need to be increased, since level may drop eleanor by 3rd trimester.  Check monthly levels.     We discussed medication side effects and activity precautions. We discussed symptoms that would warrant urgent/emergent evaluation. Patient verbalized understanding and agreement.     Return in about 4 months (around 11/22/2024).

## 2024-11-19 ENCOUNTER — POSTPARTUM (OUTPATIENT)
Dept: OBGYN CLINIC | Facility: CLINIC | Age: 39
End: 2024-11-19
Payer: COMMERCIAL

## 2024-11-19 VITALS
WEIGHT: 124.19 LBS | HEART RATE: 64 BPM | DIASTOLIC BLOOD PRESSURE: 69 MMHG | BODY MASS INDEX: 21 KG/M2 | SYSTOLIC BLOOD PRESSURE: 112 MMHG

## 2024-11-19 PROCEDURE — 3078F DIAST BP <80 MM HG: CPT | Performed by: OBSTETRICS & GYNECOLOGY

## 2024-11-19 PROCEDURE — 3074F SYST BP LT 130 MM HG: CPT | Performed by: OBSTETRICS & GYNECOLOGY

## 2024-11-19 NOTE — PROGRESS NOTES
HPI    Sveta Mckeon is a 39 year old female  here for 6 week post-partum visit.  Patient is breast feeding.   Patient denies symptoms of depression, [place]  depression score below.  Bonding well with baby.  NO SI/HI.  Moving bowels and bladder w/o issues.  Lochia resolving.       Patient was having issues postpartum.  Baby with recently diagnosis T21 and was in NICU at [facility] and [facility].  Creating added stress.  Baby now home and patient better.  Sveta was in and out of [facility] ER with seizure activity and retracted mood due to baby.  Dr Barnes from psych was following . She has follow up with psych and neuro soon.     [place]  DEPRESSION SCALE  I have been able to laugh and see the funny side of things: As much as I always could  I have looked forward with enjoyment to things: As much as I ever did  I have been anxious or worried for no good reason: No, not at all  I have felt scared or panicky for no good reason: No, not at all  Things have been getting on top of me: No, I have been coping as well as ever  I have been so unhappy that I have had difficulty sleeping: Not at all  I have felt sad or miserable: No, not at all  I have been so unhappy that I have been crying: No, never  The thought of harming myself has occurred to me: Never  Total: 1    OBSTETRIC HISTORY  OB History    Para Term  AB Living   5 5 5     5   SAB IAB Ectopic Multiple Live Births         0 5      # Outcome Date GA Lbr Gilberto/2nd Weight Sex Type Anes PTL Lv   5 Term 10/07/24 37w3d 02:18 / 00:01 5 lb 14.2 oz (2.67 kg) F NORMAL SPONT None N MATILDE   4 Term 17 41w2d 05:10 / 00:22 10 lb 10.6 oz (4.835 kg) F NORMAL SPONT Local  MATILDE   3 Term 03/15/15 40w4d  9 lb 14 oz (4.479 kg) M NORMAL SPONT   MATILDE   2 Term 12 40w1d  8 lb 3 oz (3.714 kg) F NORMAL SPONT None N MATILDE   1 Term 09 40w3d  8 lb 8 oz (3.856 kg) F NORMAL SPONT None N MATILDE       GYNE HISTORY   Menarche: 15  Use of Birth Control (if  yes, specify type): pregnant    MEDICATIONS:    Current Outpatient Medications:     folic acid 1 MG Oral Tab, Take 1 tablet (1 mg total) by mouth daily., Disp: 30 tablet, Rfl: 11    ARIPiprazole 2 MG Oral Tab, Take 1 tablet (2 mg total) by mouth daily., Disp: 30 tablet, Rfl: 0    gabapentin 100 MG Oral Cap, Take 1 capsule (100 mg total) by mouth 3 (three) times daily., Disp: 90 capsule, Rfl: 0    LEVETIRACETAM 750 MG Oral Tab, TAKE ONE TABLET BY MOUTH TWICE DAILY, Disp: 60 tablet, Rfl: 0    Prenatal 27-1 MG Oral Tab, daily., Disp: , Rfl:     ALPRAZolam 0.25 MG Oral Tab, Take 1 tablet (0.25 mg total) by mouth nightly as needed for Anxiety or Sleep., Disp: 30 tablet, Rfl: 0    ALLERGIES:  Allergies[1]    PHYSICAL EXAM  Blood pressure 112/69, pulse 64, weight 124 lb 3.2 oz (56.3 kg), last menstrual period 01/19/2024, currently breastfeeding.  General:  Well nourished, well developed woman in no acute distress  Abdomen:  soft, nontender, no masses  External Genitalia: normal appearance, hair distribution, and no lesions  Vagina:  Normal appearance without lesions, no abnormal discharge  Cervix:  Normal without tenderness on motion  Uterus: normal in size, contour, position, mobility, without tenderness  Adnexa: normal without masses or tenderness  Perineum: well healed perineum  Anus: no hemorroids       ASSESSMENT/PLAN  Sveta was seen today for postpartum care.    Diagnoses and all orders for this visit:    Postpartum state (HCC)      Discussed all options of birthcontrol including ocps, minipill, Mirena or Paragard IUD, nuvaring, orthoevra patch, nexplanon, Depoprovera, condoms or tubal sterilization options. Patient has chosen condom.  planning for vasectomy    Patient to return for annual gyne exam in 6 months.         [1] No Known Allergies

## 2024-11-24 DIAGNOSIS — G40.909 NONINTRACTABLE EPILEPSY WITHOUT STATUS EPILEPTICUS, UNSPECIFIED EPILEPSY TYPE (HCC): ICD-10-CM

## 2024-11-25 ENCOUNTER — TELEMEDICINE (OUTPATIENT)
Facility: CLINIC | Age: 39
End: 2024-11-25
Payer: COMMERCIAL

## 2024-11-25 DIAGNOSIS — Z79.899 ENCOUNTER FOR LONG-TERM (CURRENT) DRUG USE: ICD-10-CM

## 2024-11-25 DIAGNOSIS — G40.909 NONINTRACTABLE EPILEPSY WITHOUT STATUS EPILEPTICUS, UNSPECIFIED EPILEPSY TYPE (HCC): Primary | ICD-10-CM

## 2024-11-25 RX ORDER — LEVETIRACETAM 750 MG/1
750 TABLET ORAL 2 TIMES DAILY
Qty: 60 TABLET | Refills: 11 | Status: SHIPPED | OUTPATIENT
Start: 2024-11-25

## 2024-11-25 NOTE — PROGRESS NOTES
Neurology VIDEO History & Physical     ASSESSMENT & PLAN:      ICD-10-CM    1. Nonintractable epilepsy without status epilepticus, unspecified epilepsy type ([facility])  G40.909       2. Encounter for long-term (current) drug use  Z79.899 Levetiracetam, Serum        Epilepsy, unspecified, possibly generalized.  Uncontrolled with threat to bodily function.  Continue 750 mg LEV unchanged, due to side effects and known triggers.  We discussed that breastfeeding is still recommended.  Check LEV levels.    We discussed medication side effects and activity precautions.  This includes the strict recommendation of no driving until seizure free for 6 consecutive months.  We discussed symptoms that would warrant urgent/emergent evaluation. Patient verbalized understanding and agreement.  This visit was conducted as a two-way, real-time, interactive synchronous Video encounter.   The patient consents to both scheduling and proceeding with this virtual encounter, and understands this visit may be billed to their insurance carrier.    This has been done in good brian to provide continuity of care in the best interest of the provider-patient relationship.  There are limitations of this visit, especially in physical examination, though every conscious effort was taken to allow for as appropriate care, time, and medical decision making as reasonably possible.      Return in about 4 months (around 3/25/2025).       ~~~~~~~~~~~~~~~~~~~~~~~~~~~    CHIEF COMPLAINT / REASON FOR VISIT:    Chief Complaint   Patient presents with    Seizures     HISTORY OBTAINED FROM:  Patient and others as above  Chart review    HISTORY OF PRESENT ILLNESS:  Sveta Mckeon is a 39 year old female with seizures since age 16, exclusively nocturnal convulsions, and also staring event (this only happened initially)  Last seizures Jan and Sept 2023 (convulsions both triggered by sleep deprivation).  Have been triggered by sleep deprivation and post-partum in  past.    INTERIM HISTORY:  Had baby girl 10/7, it was very stressful due to complications with baby (who had to be transferred twice, required LP and NICU stay).    Pt had a seizure 10/28 (at [facility] while  was admitted there) - was convulsion.  On  had another convulsion (stress, poor sleep).  Went to [place] ER  and had HCT.  Went back to ER again due to being withdrawn, suspicion of postpartum depression.    Driving status:  NOT Driving    Previous medication trials:  LCM - nightmares  CBZ for many years    DATA REVIEWED:  As documented in the HPI    2024  Head CT unremarkable (I independently analyzed and interpreted the above, and I agree with the reading physician report(s).)      2024  LEV 28.0    2024  LEV 35.2    May 2024  LEV 21.5    2024  LEV 26.2  Neuro note ([facility]) - apparently did have abnormal EEG in 2015  EEG unremarkable    2023  CBZ 4.5    2021  Head CT unremarkable       PHYSICAL EXAMINATION:  LMP 2024         No Known Allergies    Current medications:  No outpatient medications have been marked as taking for the 24 encounter (Telemedicine) with Earline Palacios MD.       Past Medical History:    Environmental allergies    History of pregnancy        Seasonal allergies    Seizure disorder (HCC)    last seizure 2015       Past Surgical History:   Procedure Laterality Date    Hernia surgery  2016    Umbilical          Ventral hernia repair  3/18/2016    (Supraumbilical/umbilical)       Social History     Socioeconomic History    Marital status:     Number of children: 3   Occupational History    Occupation: dental assistant   Tobacco Use    Smoking status: Never     Passive exposure: Never    Smokeless tobacco: Never   Vaping Use    Vaping status: Never Used   Substance and Sexual Activity    Alcohol use: Never    Drug use: Never   Other Topics Concern    Caffeine Concern No    Exercise No    Reaction to local  anesthetic No       Family History   Problem Relation Age of Onset    Diabetes Maternal Grandmother     Pancreatic Cancer Paternal Grandmother     Cancer Paternal Grandmother         unknown type     Earline Palacios MD, FAES, FAAN  Board-Certified in Neurology, Epilepsy, and Clinical Neurophysiology  [facility]

## 2024-11-25 NOTE — TELEPHONE ENCOUNTER
Medication: LEVETIRACETAM 750 MG Oral Tab      Date of last refill: 10/22/24 (#60/0)  [place]     Last office visit: 7/22/24  Due back to clinic per last office note:  4 months (around 11/22/2024).   Date next office visit scheduled:    Future Appointments   Date Time Provider Department Center   11/25/2024 10:00 AM Earline Palacios MD [facility]           Last OV note recommendation:    Epilepsy, unspecified, possibly generalized.  Well controlled but 27 weeks pregnant and thus high risk with threat to function and needs intensive monitoring.  Continue  mg BID, and FA 1 mg daily.  However, in future LEV may need to be increased, since level may drop eleanor by 3rd trimester.  Check monthly levels.     We discussed medication side effects and activity precautions. We discussed symptoms that would warrant urgent/emergent evaluation. Patient verbalized understanding and agreement.     Return in about 4 months (around 11/22/2024).

## 2024-12-07 ENCOUNTER — LAB ENCOUNTER (OUTPATIENT)
Dept: LAB | Facility: HOSPITAL | Age: 39
End: 2024-12-07
Attending: Other
Payer: COMMERCIAL

## 2024-12-07 DIAGNOSIS — Z79.899 ENCOUNTER FOR LONG-TERM (CURRENT) DRUG USE: ICD-10-CM

## 2024-12-07 PROCEDURE — 80177 DRUG SCRN QUAN LEVETIRACETAM: CPT | Performed by: OTHER

## 2024-12-09 ENCOUNTER — PATIENT MESSAGE (OUTPATIENT)
Dept: OBGYN CLINIC | Facility: CLINIC | Age: 39
End: 2024-12-09

## 2024-12-10 LAB — LEVETIRACETAM LVL: 34 UG/ML

## 2025-03-03 ENCOUNTER — OFFICE VISIT (OUTPATIENT)
Dept: FAMILY MEDICINE CLINIC | Facility: CLINIC | Age: 40
End: 2025-03-03
Payer: COMMERCIAL

## 2025-03-03 VITALS
HEART RATE: 62 BPM | DIASTOLIC BLOOD PRESSURE: 72 MMHG | SYSTOLIC BLOOD PRESSURE: 112 MMHG | WEIGHT: 119 LBS | BODY MASS INDEX: 20 KG/M2

## 2025-03-03 DIAGNOSIS — N63.10 MASS OF RIGHT BREAST, UNSPECIFIED QUADRANT: ICD-10-CM

## 2025-03-03 DIAGNOSIS — Z00.00 ENCOUNTER FOR ANNUAL HEALTH EXAMINATION: Primary | ICD-10-CM

## 2025-03-03 DIAGNOSIS — Z12.4 CERVICAL CANCER SCREENING: ICD-10-CM

## 2025-03-03 DIAGNOSIS — R92.8 ABNORMAL MAMMOGRAM: ICD-10-CM

## 2025-03-03 DIAGNOSIS — L98.9 SKIN LESION: ICD-10-CM

## 2025-03-03 NOTE — PROGRESS NOTES
HPI:   Sveta Mckeon is a 40 year old female who presents for a complete physical exam.    + concerned about black spot on her upper back that has been there for months as well as darker vertical lines affecting most fingernails.    Wt Readings from Last 3 Encounters:   25 119 lb (54 kg)   24 124 lb 3.2 oz (56.3 kg)   10/07/24 156 lb (70.8 kg)     Body mass index is 20.43 kg/m².       Current Outpatient Medications   Medication Sig Dispense Refill    levetiracetam 750 MG Oral Tab TAKE ONE TABLET BY MOUTH TWICE DAILY 60 tablet 11    folic acid 1 MG Oral Tab Take 1 tablet (1 mg total) by mouth daily. 30 tablet 11    Prenatal 27-1 MG Oral Tab daily.        Past Medical History:    Environmental allergies    History of pregnancy        Seasonal allergies    Seizure disorder (HCC)    last seizure 2015      Past Surgical History:   Procedure Laterality Date    Hernia surgery  2016    Umbilical          Ventral hernia repair  3/18/2016    (Supraumbilical/umbilical)      Family History   Problem Relation Age of Onset    Diabetes Maternal Grandmother     Pancreatic Cancer Paternal Grandmother     Cancer Paternal Grandmother         unknown type      Social History:   Social History     Socioeconomic History    Marital status:     Number of children: 3   Occupational History    Occupation: dental assistant   Tobacco Use    Smoking status: Never     Passive exposure: Never    Smokeless tobacco: Never   Vaping Use    Vaping status: Never Used   Substance and Sexual Activity    Alcohol use: Never    Drug use: Never   Other Topics Concern    Caffeine Concern No    Exercise No    Reaction to local anesthetic No   Social History Narrative    The patient does not use an assistive device..      The patient does not live in a home with stairs.     Social Drivers of Health     Food Insecurity: No Food Insecurity (3/15/2024)    Food Insecurity     Food Insecurity: Never true   Transportation Needs:  No Transportation Needs (3/15/2024)    Transportation Needs     Lack of Transportation: No   Stress: No Stress Concern Present (3/15/2024)    Stress     Feeling of Stress : No   Housing Stability: Low Risk  (3/15/2024)    Housing Stability     Housing Instability: No          REVIEW OF SYSTEMS:   Negative, except per HPI.     EXAM:   /72 (BP Location: Right arm, Patient Position: Sitting, Cuff Size: adult)   Pulse 62   Wt 119 lb (54 kg)   LMP 01/19/2024 (Exact Date)   Breastfeeding No   BMI 20.43 kg/m²     GENERAL: well developed, well nourished, in no apparent distress  SKIN: no rashes, no suspicious lesions  HEENT: atraumatic, normocephalic, ears are clear  EYES: PERRLA, EOMI,conjunctiva are clear  NECK: supple, no adenopathy  LUNGS: clear to auscultation  CARDIO: RRR without murmur  GI: good BS, no masses or tenderness  MUSCULOSKELETAL: back is not tender, FROM of the back  EXTREMITIES: no cyanosis or edema  NEURO: Oriented times three, cranial nerves are intact, motor and sensory are grossly intact    ASSESSMENT AND PLAN:   Sveta Mckeon is a 40 year old female who presents for a complete physical exam.    1. Encounter for annual health examination  -Medical, surgical and social history, as well as medications and allergies were reviewed with patient.  - CBC With Differential With Platelet; Future  - Comp Metabolic Panel (14); Future  - Hemoglobin A1C; Future  - Lipid Panel; Future  - TSH W Reflex To Free T4; Future    2. Abnormal mammogram  - [facility] DIAGNOSTIC BILATERAL (CPT=77066); Future  - US BREAST RIGHT LIMITED (CPT=76642); Future    3. Mass of right breast, unspecified quadrant  - [facility] DIAGNOSTIC BILATERAL (CPT=77066); Future  - US BREAST RIGHT LIMITED (CPT=76642); Future    4. Cervical cancer screening  - [facility] - INTERNAL    5. Skin lesion  - DERM - INTERNAL       Verna Vazquez MD  3/3/2025  10:28 AM

## 2025-03-04 ENCOUNTER — LAB ENCOUNTER (OUTPATIENT)
Dept: LAB | Facility: HOSPITAL | Age: 40
End: 2025-03-04
Attending: FAMILY MEDICINE
Payer: COMMERCIAL

## 2025-03-04 DIAGNOSIS — Z00.00 ENCOUNTER FOR ANNUAL HEALTH EXAMINATION: ICD-10-CM

## 2025-03-04 LAB
ALBUMIN SERPL-MCNC: 5.1 G/DL (ref 3.2–4.8)
ALBUMIN/GLOB SERPL: 1.9 {RATIO} (ref 1–2)
ALP LIVER SERPL-CCNC: 87 U/L
ALT SERPL-CCNC: 16 U/L
ANION GAP SERPL CALC-SCNC: 10 MMOL/L (ref 0–18)
AST SERPL-CCNC: 25 U/L (ref ?–34)
BASOPHILS # BLD AUTO: 0.04 X10(3) UL (ref 0–0.2)
BASOPHILS NFR BLD AUTO: 0.7 %
BILIRUB SERPL-MCNC: 0.6 MG/DL (ref 0.3–1.2)
BUN BLD-MCNC: 13 MG/DL (ref 9–23)
BUN/CREAT SERPL: 16.9 (ref 10–20)
CALCIUM BLD-MCNC: 9.6 MG/DL (ref 8.7–10.4)
CHLORIDE SERPL-SCNC: 105 MMOL/L (ref 98–112)
CHOLEST SERPL-MCNC: 219 MG/DL (ref ?–200)
CO2 SERPL-SCNC: 27 MMOL/L (ref 21–32)
CREAT BLD-MCNC: 0.77 MG/DL
DEPRECATED RDW RBC AUTO: 37.7 FL (ref 35.1–46.3)
EGFRCR SERPLBLD CKD-EPI 2021: 100 ML/MIN/1.73M2 (ref 60–?)
EOSINOPHIL # BLD AUTO: 0.06 X10(3) UL (ref 0–0.7)
EOSINOPHIL NFR BLD AUTO: 1.1 %
ERYTHROCYTE [DISTWIDTH] IN BLOOD BY AUTOMATED COUNT: 11.8 % (ref 11–15)
EST. AVERAGE GLUCOSE BLD GHB EST-MCNC: 103 MG/DL (ref 68–126)
FASTING PATIENT LIPID ANSWER: YES
FASTING STATUS PATIENT QL REPORTED: YES
GLOBULIN PLAS-MCNC: 2.7 G/DL (ref 2–3.5)
GLUCOSE BLD-MCNC: 80 MG/DL (ref 70–99)
HBA1C MFR BLD: 5.2 % (ref ?–5.7)
HCT VFR BLD AUTO: 41.7 %
HDLC SERPL-MCNC: 100 MG/DL (ref 40–59)
HGB BLD-MCNC: 14.3 G/DL
IMM GRANULOCYTES # BLD AUTO: 0.01 X10(3) UL (ref 0–1)
IMM GRANULOCYTES NFR BLD: 0.2 %
LDLC SERPL CALC-MCNC: 112 MG/DL (ref ?–100)
LYMPHOCYTES # BLD AUTO: 2.45 X10(3) UL (ref 1–4)
LYMPHOCYTES NFR BLD AUTO: 43.1 %
MCH RBC QN AUTO: 30.2 PG (ref 26–34)
MCHC RBC AUTO-ENTMCNC: 34.3 G/DL (ref 31–37)
MCV RBC AUTO: 88.2 FL
MONOCYTES # BLD AUTO: 0.32 X10(3) UL (ref 0.1–1)
MONOCYTES NFR BLD AUTO: 5.6 %
NEUTROPHILS # BLD AUTO: 2.8 X10 (3) UL (ref 1.5–7.7)
NEUTROPHILS # BLD AUTO: 2.8 X10(3) UL (ref 1.5–7.7)
NEUTROPHILS NFR BLD AUTO: 49.3 %
NONHDLC SERPL-MCNC: 119 MG/DL (ref ?–130)
OSMOLALITY SERPL CALC.SUM OF ELEC: 293 MOSM/KG (ref 275–295)
PLATELET # BLD AUTO: 241 10(3)UL (ref 150–450)
POTASSIUM SERPL-SCNC: 4 MMOL/L (ref 3.5–5.1)
PROT SERPL-MCNC: 7.8 G/DL (ref 5.7–8.2)
RBC # BLD AUTO: 4.73 X10(6)UL
SODIUM SERPL-SCNC: 142 MMOL/L (ref 136–145)
TRIGL SERPL-MCNC: 38 MG/DL (ref 30–149)
TSI SER-ACNC: 0.65 UIU/ML (ref 0.55–4.78)
VLDLC SERPL CALC-MCNC: 6 MG/DL (ref 0–30)
WBC # BLD AUTO: 5.7 X10(3) UL (ref 4–11)

## 2025-03-04 PROCEDURE — 83036 HEMOGLOBIN GLYCOSYLATED A1C: CPT

## 2025-03-04 PROCEDURE — 84443 ASSAY THYROID STIM HORMONE: CPT

## 2025-03-04 PROCEDURE — 80053 COMPREHEN METABOLIC PANEL: CPT

## 2025-03-04 PROCEDURE — 36415 COLL VENOUS BLD VENIPUNCTURE: CPT

## 2025-03-04 PROCEDURE — 85025 COMPLETE CBC W/AUTO DIFF WBC: CPT

## 2025-03-04 PROCEDURE — 80061 LIPID PANEL: CPT

## 2025-03-21 ENCOUNTER — HOSPITAL ENCOUNTER (OUTPATIENT)
Dept: MAMMOGRAPHY | Facility: HOSPITAL | Age: 40
Discharge: HOME | End: 2025-03-21
Attending: FAMILY MEDICINE
Payer: COMMERCIAL

## 2025-03-21 ENCOUNTER — HOSPITAL ENCOUNTER (OUTPATIENT)
Dept: ULTRASOUND IMAGING | Facility: HOSPITAL | Age: 40
Discharge: HOME | End: 2025-03-21
Attending: FAMILY MEDICINE
Payer: COMMERCIAL

## 2025-03-21 DIAGNOSIS — R92.8 ABNORMAL MAMMOGRAM: ICD-10-CM

## 2025-03-21 DIAGNOSIS — N63.10 MASS OF RIGHT BREAST, UNSPECIFIED QUADRANT: ICD-10-CM

## 2025-03-21 PROCEDURE — 77062 BREAST TOMOSYNTHESIS BI: CPT | Performed by: FAMILY MEDICINE

## 2025-03-21 PROCEDURE — 77066 DX MAMMO INCL CAD BI: CPT | Performed by: FAMILY MEDICINE

## 2025-03-21 PROCEDURE — 76642 ULTRASOUND BREAST LIMITED: CPT | Performed by: FAMILY MEDICINE

## 2025-03-25 ENCOUNTER — TELEMEDICINE (OUTPATIENT)
Dept: NEUROLOGY | Facility: CLINIC | Age: 40
End: 2025-03-25
Payer: COMMERCIAL

## 2025-03-25 DIAGNOSIS — G40.909 SEIZURE DISORDER (HCC): ICD-10-CM

## 2025-03-25 DIAGNOSIS — G40.909 NONINTRACTABLE EPILEPSY WITHOUT STATUS EPILEPTICUS, UNSPECIFIED EPILEPSY TYPE (HCC): ICD-10-CM

## 2025-03-25 PROCEDURE — 98006 SYNCH AUDIO-VIDEO EST MOD 30: CPT | Performed by: OTHER

## 2025-03-25 RX ORDER — FOLIC ACID 1 MG/1
1 TABLET ORAL 2 TIMES DAILY
Qty: 60 TABLET | Refills: 11 | Status: SHIPPED | OUTPATIENT
Start: 2025-03-25 | End: 2026-03-20

## 2025-03-25 NOTE — PROGRESS NOTES
Neurology VIDEO History & Physical     ASSESSMENT & PLAN:      ICD-10-CM    1. Seizure disorder ([facility])  G40.909 folic acid 1 MG Oral Tab      2. Nonintractable epilepsy without status epilepticus, unspecified epilepsy type ([facility])  G40.909         Epilepsy, unspecified, possibly generalized.  Improving but not to goal of 6 months seizure free.  Continue  mg BID + FA 1 mg BID.      We discussed medication side effects and activity precautions.  This includes the strict recommendation of no driving until seizure free for 6 consecutive months (last seizure 10/28/24, advised she contact me at 6 months so I can document seizure-free period and permit her to drive).  We discussed symptoms that would warrant urgent/emergent evaluation. Patient verbalized understanding and agreement.    This visit was conducted as a two-way, real-time, interactive synchronous Video encounter.   The patient consents to both scheduling and proceeding with this virtual encounter, and understands this visit may be billed to their insurance carrier.    This has been done in good brian to provide continuity of care in the best interest of the provider-patient relationship.  There are limitations of this visit, especially in physical examination, though every conscious effort was taken to allow for as appropriate care, time, and medical decision making as reasonably possible.      Return in about 6 months (around 9/25/2025).       ~~~~~~~~~~~~~~~~~~~~~~~~~~~    CHIEF COMPLAINT / REASON FOR VISIT:    Chief Complaint   Patient presents with    Seizures     HISTORY OBTAINED FROM:  Patient and others as above  Chart review    HISTORY OF PRESENT ILLNESS:  Sveta Mckeon is a 40 year old female with seizures since age 16, exclusively nocturnal convulsions, and also staring event (this only happened initially)  Last seizures Jan and Sept 2023 (convulsions both triggered by sleep deprivation).  Have been triggered by sleep deprivation and post-partum  in past.    Had baby girl 10/7, it was very stressful due to complications with baby (who had to be transferred twice, required LP and NICU stay).    Pt had a seizure 10/28 (at [facility] while  was admitted there) - was convulsion.  On  had another convulsion (stress, poor sleep).  Went to [facility]  and had HCT.  Went back to ER again due to being withdrawn, suspicion of postpartum depression.      INTERIM HISTORY:  No seizures, no complaints.  Taking FA BID for convenience along with LEV.  No side effects.    Driving status:  NOT Driving    Previous medication trials:  LCM - nightmares  CBZ for many years    DATA REVIEWED:  As documented in the HPI    2025  A1c, CBC, CMP, TSH unremarkable     Dec 2024  LEV 34    2024  Head CT unremarkable (I independently analyzed and interpreted the above, and I agree with the reading physician report(s).)    2024  LEV 28.0    2024  LEV 35.2    May 2024  LEV 21.5    2024  LEV 26.2  Neuro note ([facility]) - apparently did have abnormal EEG in 2015  EEG unremarkable    2023  CBZ 4.5    2021  Head CT unremarkable       PHYSICAL EXAMINATION:  LMP 2024 (Exact Date)         No Known Allergies    Current medications:   folic acid 1 MG Oral Tab Take 1 tablet (1 mg total) by mouth in the morning and 1 tablet (1 mg total) before bedtime. 60 tablet 11    levetiracetam 750 MG Oral Tab TAKE ONE TABLET BY MOUTH TWICE DAILY 60 tablet 11    Prenatal 27-1 MG Oral Tab daily.         Past Medical History:    Environmental allergies    History of pregnancy        Seasonal allergies    Seizure disorder (HCC)    last seizure 2015       Past Surgical History:   Procedure Laterality Date    Hernia surgery  2016    Umbilical          Ventral hernia repair  3/18/2016    (Supraumbilical/umbilical)       Social History     Socioeconomic History    Marital status:     Number of children: 3   Occupational History    Occupation:  dental assistant   Tobacco Use    Smoking status: Never     Passive exposure: Never    Smokeless tobacco: Never   Vaping Use    Vaping status: Never Used   Substance and Sexual Activity    Alcohol use: Never    Drug use: Never   Other Topics Concern    Caffeine Concern No    Exercise No    Reaction to local anesthetic No       Family History   Problem Relation Age of Onset    Diabetes Maternal Grandmother     Pancreatic Cancer Paternal Grandmother     Cancer Paternal Grandmother         unknown type     Earline Palacios MD, FAES, FAAN  Board-Certified in Neurology, Epilepsy, and Clinical Neurophysiology  [facility]

## 2025-05-02 NOTE — TELEPHONE ENCOUNTER
Called patient in regards to RX request for Pre- vitamins    She does needs  refill as she is breastfeeding     Medication pended for your review / approval

## 2025-05-05 RX ORDER — PRENATAL WITH FERROUS FUM AND FOLIC ACID 3080; 920; 120; 400; 22; 1.84; 3; 20; 10; 1; 12; 200; 27; 25; 2 [IU]/1; [IU]/1; MG/1; [IU]/1; MG/1; MG/1; MG/1; MG/1; MG/1; MG/1; UG/1; MG/1; MG/1; MG/1; MG/1
1 TABLET ORAL DAILY
Qty: 90 TABLET | Refills: 0 | Status: SHIPPED | OUTPATIENT
Start: 2025-05-05

## (undated) DIAGNOSIS — E55.9 VITAMIN D DEFICIENCY: ICD-10-CM

## (undated) DIAGNOSIS — M85.80 OSTEOPENIA, UNSPECIFIED LOCATION: Primary | ICD-10-CM

## (undated) NOTE — LETTER
No referring provider defined for this encounter.       03/15/24        Patient: Sveta Mckeon   YOB: 1985   Date of Visit: 3/15/2024       Dear  Dr. Rachel Beck,      Thank you for referring Sveta Mckeon to my practice.  Please find my assessment and plan below.      Sveta Mckeon is a  39 year old RH woman  W/ a pmhx of nocturnal seizures, osteopenia who is currently 8 weeks prengant presents in follow up for her epilepsy.    She had last  had a breakthrough seizure in September 2023. Prior seizure occured the setting of acute stressors (death of her nephew). She had osteopenia and was switched from Carbamezapine to vimpat but still had  1 breakthrough seizure in early April 2023. This was a nocturnal seizure while she was asleep. She is having nightmares/unpleasant dreams on lacosamide.  Per pubmed search and review on online databases there is no mention of nightmares or abnormal dreams on vimpat. She is having  Nearly nightly nightmares. Denies sleep deprivation due to the nightmares.   She is now sleeping  < 7 hrs a night with the nightmares. Normally  she gets 8 hrs. She would like to go up on the dose of vimpat rather than changing her antiseizure medication.    Switched to keppra b/c of persistent nightmares.     Now she is 8 weeks pregnant.. She needs to stay on folic acid. I would like her to follow with Dr. Palacios, who is an epileptologist and has expertise in managing anti seizure medications during pregnancy.   In the past she was counseled to use birth control.     Long term she will now follow with Dr. Palacios                 Sincerely,   Abel Flores DO   [facility], [place]  [address] [address]    Document electronically generated by:  Abel Flores DO

## (undated) NOTE — LETTER
[id number] [facility]  [address], [address]  [address] [facility] [address] [phone]        Dear Milan Lyn MD,      I had the pleasure of seeing your patient, Christopher Arreguin on 10/3/2017.      Below please find Problem Relation Age of Onset   • Diabetes Maternal Grandmother    • Cancer Paternal Grandmother      unknown type      Social History:  Social History    Marital status:              Spouse name:                       Years of education: Pupil react to light. Fundoscopic exam normal.  III,IV,VI- EOM full, with normal pursuit. V-Facial sensation intact, with symmetric corneal reflex. VII- face symmetric. VIII- Auditory acuity symmetric.       Motor: 5/5 strength in the upper and lower extre

## (undated) NOTE — LETTER
[facility]   Administracion de Anestesia  Yo, Sveta Mckeon, acepto ser atendido por un anestesiólogo, quien está especialmente capacitado para monitorearme y darme medicamento para hacerme dormir o mantenerme cómodo johnna mi procedimiento.   Entiendo que mi anestesiólogo no es un empleado o agente de [facility]. Él o yamilet trabaja para [facility]  Aruna el paciente que solicita los servicios de anestesia, estoy de acuerdo con lo siguiente:  Permitir al anestesiólogo (médico de anestesia) que me suministre el medicamento y hacer los procedimientos adicionales que sim necesarios. Algunos ejemplos son: Al iniciar o utilizar stone “IV” para suministrarme medicamentos, fluidos o rajan johnna mi procedimiento, y colocarme stone sonda de respiración, me ayudará a respirar cuando esté dormido (intubación). En el dheeraj de que mi corazón deje de funcionar correctamente, entiendo que mi anestesiólogo hará todo lo posible para salvar mi bernard, a menos que los documentos de No resucitar de [facility] lo indiquen de otra manera.  Informar a mi anestesista antes del procedimiento:  Si estoy embarazada.  La última vez que comí o bebí algo.  Todos los medicamentos que sahra (incluyendo medicamentos recetados, suplementos herbales y pastillas que puedo comprar sin receta médica (incluyendo drogas en la escalera [medicamentos ilegales]). No informar a mi anestesiólogo acerca de estos medicamentos puede aumentar mi riesgo de complicaciones con la anestesia.  Si soy alérgico a cualquier cosa o he tenido previamente stone reacción adversa a la anestesia.  Entiendo cómo la anestesia me ayudará (beneficios).  Entiendo que con cualquier tipo de anestesia hay riesgos:  Los riesgos más comunes son: náuseas, vómitos, dolor de garganta, dolor muscular, daño a los ojos, la boca o los dientes (por la colocación de la sonda de respiración).  Los riesgos poco comunes incluyen:  recordar lo que sucedió johnna mi procedimiento, reacciones alérgicas a los medicamentos, lesiones en las vías respiratorias, el corazón, los pulmones, la visión, los nervios o músculos, y en casos sumamente raros, la muerte.  Mii médico me ha explicado otras opciones de atención disponibles para mí (alternativas).  Pacientes embarazadas (“epidural”):    Entiendo que los riesgos de recibir stone inyección epidural (medicamento que se aplica en la espalda para ayudar a controlar el dolor johnna el parto), incluyen picazón, presión arterial baja, dificultad para orinar, dolor de andrey o disminución en el ritmo del corazón del bebé. Los riesgos muy poco comunes incluyen infección, hemorragia, convulsiones, ritmo cardíaco irregular y lesión del nervio.  Anestesia regional (“columna vertebral”, “epidural” y “bloqueo de los nervios”):  Entiendo que hay complicaciones poco frecuentes trung posibles, e incluyen dolor de andrey, sangrado, infección, convulsiones, ritmo cardíaco irregular y la lesión del nervio.  .   Puedo cambiar de opinión acerca de recibir servicios de anestesia en cualquier momento antes de que se me administre el medicamento.         Paciente (o representante) Firma/Relación con el paciente  Fecha  Hora  Patient Signature/Signature of Responsible person Date Time           Nombre del intérprete (en nicholson dheeraj)  Idioma/Organización  Hora  Name of  Language/Organization Time          Firma del anestesiólogo Fecha  Hora  Signature of anesthesiologist Date Time    He hablado del procedimiento y la información anterior con el paciente (o el representante del paciente) y respondí carrie preguntas. El paciente o nicholson representante ha aceptado recibir los servicios de anestesia.         Testigo Fecha  Hora  Witness Date Time  He verificado que la firma es la del paciente o del representante del paciente, y que se firmó antes del procedimiento.    Nombre del paciente: Sveta Mckeon  Fec. de Nac.:  3/1/1985                 En letra de imprenta: October 7, 2024  Expediente médico No. [id number]                         Página 1 de 2  ----------ANESTHESIA CONSENT----------

## (undated) NOTE — ED AVS SNAPSHOT
Fe Ann   MRN: [id number]    Department:  [facility]   Date of Visit:  10/13/2017           Disclosure     Insurance plans vary and the physician(s) referred by the ER may not be covered by your plan.  Please CARE PHYSICIAN AT ONCE OR RETURN IMMEDIATELY TO THE EMERGENCY DEPARTMENT. If you have been prescribed any medication(s), please fill your prescription right away and begin taking the medication(s) as directed.   If you believe that any of the medications

## (undated) NOTE — IP AVS SNAPSHOT
[address]  [address] [facility] [phone]                Discharge Summary   4/21/2017    [address] [place] [address]           Admission Information        Provider Department    4/21/2017 Shirlie Baumgarten, MD Preeclampsia is a serious disease related to high blood pressure (hypertension). Preeclampsia/hypertension can happen during pregnancy and up to 6 weeks following childbirth.   Contact your doctor or midwife immediately if you experience any of the follow [place] [address]   [phone]            Jun 30, 2017 11:50 AM   Return OB with Clinton Finley, [address] [facility], [address] [address]   [phone]

## (undated) NOTE — LETTER
December 4, 2017     [address]      Dear Nena Zhang:    Below are the results from your recent visit:    Resulted Orders   EKG 12-LEAD    Narrative    ECG Report      Interpretation      ------------------------

## (undated) NOTE — LETTER
9/1/2017              [address]         Dear Pippa Lu,    It was a pleasure to see you. Your X-ray was normal.  There is no need for further testing at this time.   I look forward to seeing you at yo

## (undated) NOTE — LETTER
5/10/2019              [address]         Dear Javier Simons  You have negative pap and HPV test. Continue yearly exam and do pap in 3 years. Sincerely,    Cydney Melara.  [address] [facility]

## (undated) NOTE — LETTER
[facility]  Administration of Anesthesia  I, Sveta Mckeon agree to be cared for by a physician anesthesiologist alone and/or with a nurse anesthetist, who is specially trained to monitor me and give me medicine to put me to sleep or keep me comfortable during my procedure    I understand that my anesthesiologist and/or anesthetist is not an employee or agent of [facility] or [facility]. He or she works for [facility]    As the patient asking for anesthesia services, I agree to:  Allow the anesthesiologist (anesthesia doctor) to give me medicine and do additional procedures as necessary. Some examples are: Starting or using an “IV” to give me medicine, fluids or blood during my procedure, and having a breathing tube placed to help me breathe when I’m asleep (intubation). In the event that my heart stops working properly, I understand that my anesthesiologist will make every effort to sustain my life, unless otherwise directed by [facility] Do Not Resuscitate documents.  Tell my anesthesia doctor before my procedure:  If I am pregnant.  The last time that I ate or drank.  iii. All of the medicines I take (including prescriptions, herbal supplements, and pills I can buy without a prescription (including street drugs/illegal medications). Failure to inform my anesthesiologist about these medicines may increase my risk of anesthetic complications.  iv.If I am allergic to anything or have had a reaction to anesthesia before.  I understand how the anesthesia medicine will help me (benefits).  I understand that with any type of anesthesia medicine there are risks:  The most common risks are: nausea, vomiting, sore throat, muscle soreness, damage to my eyes, mouth, or teeth (from breathing tube placement).  Rare risks include: remembering what happened during my procedure, allergic reactions to medications, injury to my airway, heart, lungs, vision, nerves,  or muscles and in extremely rare instances death.  My doctor has explained to me other choices available to me for my care (alternatives).  Pregnant Patients (“epidural”):  I understand that the risks of having an epidural (medicine given into my back to help control pain during labor), include itching, low blood pressure, difficulty urinating, headache or slowing of the baby’s heart. Very rare risks include infection, bleeding, seizure, irregular heart rhythms and nerve injury.  Regional Anesthesia (“spinal”, “epidural”, & “nerve blocks”):  I understand that rare but potential complications include headache, bleeding, infection, seizure, irregular heart rhythms, and nerve injury.    _____________________________________________________________________________  Patient (or Representative) Signature/Relationship to Patient  Date   Time    _____________________________________________________________________________   Name (if used)    Language/Organization   Time    _____________________________________________________________________________  Nurse Anesthetist Signature     Date   Time  _____________________________________________________________________________  Anesthesiologist Signature     Date   Time  I have discussed the procedure and information above with the patient (or patient’s representative) and answered their questions. The patient or their representative has agreed to have anesthesia services.    _____________________________________________________________________________  Witness        Date   Time  I have verified that the signature is that of the patient or patient’s representative, and that it was signed before the procedure  Patient Name: Sveta Mckeon     : 3/1/1985                 Printed: 10/7/2024 at 4:36 AM    Medical Record #: [id number]                                            Page 1 of 1  ----------ANESTHESIA CONSENT----------

## (undated) NOTE — ED AVS SNAPSHOT
Duque Tyler   MRN: [id number]    Department:  [facility] Emergency [facility]   Date of Visit:  7/28/2017           Disclosure     Insurance plans vary and the physician(s) referred by the ER may not be covered by your plan.  Please c CARE PHYSICIAN AT ONCE OR RETURN IMMEDIATELY TO THE EMERGENCY DEPARTMENT. If you have been prescribed any medication(s), please fill your prescription right away and begin taking the medication(s) as directed.   If you believe that any of the medications

## (undated) NOTE — IP AVS SNAPSHOT
[address] [place] [address] [phone]                Discharge Summary   4/18/2017    [facility]            Admission Information        Provider Department    4/18/2017 Wil Bowden MD E Future Appointments     Apr 24, 2017  2:40 PM   Return OB with Jackie Lyle MD   [facility], [address])    [address]   [phone]            May 12, WBC RBC Hemoglobin Hematocrit MCV MCH MCHC RDW Platelet MPV    (37/49/24)  9.7 (04/18/17)  3.74 (04/18/17)  12.3 (04/18/17)  36.1 (04/18/17)  96.7 -- -- -- (04/18/17)  184 (04/18/17)  9.1    (03/27/17)  9.1 (03/27/17)  3.53 (L) (03/27/17)  11.7 (L) (03/27

## (undated) NOTE — ED AVS SNAPSHOT
Carlos Arthur   MRN: [id number]    Department:  [facility] Emergency Department   Date of Visit:  8/24/2018           Disclosure     Insurance plans vary and the physician(s) referred by the ER may not be covered by your plan.  Please c CARE PHYSICIAN AT ONCE OR RETURN IMMEDIATELY TO THE EMERGENCY DEPARTMENT. If you have been prescribed any medication(s), please fill your prescription right away and begin taking the medication(s) as directed.   If you believe that any of the medications

## (undated) NOTE — LETTER
No referring provider defined for this encounter. 02/22/23        Patient: Rani Lombardo   YOB: 1985   Date of Visit: 2/22/2023       Dear  Dr. Nimisha Toth MD,      Thank you for referring Rani Lombardo to my practice. Please find my assessment and plan below. Rani Lombardo is a 40year old RH woman  W/ a pmhx of nocturnal seizures and osteopenia who presents in follow up for a breakthrough seizure in the setting of acute stressors. She has had these breakthrough seizures w therapeutic levels  in the past.  In pt's with purely nocturnal seizures there is no driving restriction. B/c she has osteopenia she need to come off the Carbamezapine. Will switch her to Lacosamide. She needs an ekg in  1 week to look for AZ interval prolongation.               Sincerely,   Meghan Perez DO   [facility], [address]  [place] [address]    Document electronically generated by:  Meghan Perez DO

## (undated) NOTE — LETTER
11/16/2017              [address]         Dear Michael De La O,      It was a pleasure to see you at our [place] [address], [facility] office.   Your lab tests were normal.  There is no need